# Patient Record
Sex: FEMALE | Race: WHITE | NOT HISPANIC OR LATINO | Employment: FULL TIME | URBAN - METROPOLITAN AREA
[De-identification: names, ages, dates, MRNs, and addresses within clinical notes are randomized per-mention and may not be internally consistent; named-entity substitution may affect disease eponyms.]

---

## 2019-09-06 ENCOUNTER — APPOINTMENT (EMERGENCY)
Dept: ULTRASOUND IMAGING | Facility: HOSPITAL | Age: 44
End: 2019-09-06
Payer: COMMERCIAL

## 2019-09-06 ENCOUNTER — OFFICE VISIT (OUTPATIENT)
Dept: URGENT CARE | Age: 44
End: 2019-09-06
Payer: COMMERCIAL

## 2019-09-06 ENCOUNTER — HOSPITAL ENCOUNTER (EMERGENCY)
Facility: HOSPITAL | Age: 44
Discharge: HOME | End: 2019-09-06
Attending: EMERGENCY MEDICINE | Admitting: EMERGENCY MEDICINE
Payer: COMMERCIAL

## 2019-09-06 VITALS
BODY MASS INDEX: 21.33 KG/M2 | HEIGHT: 65 IN | WEIGHT: 128 LBS | TEMPERATURE: 99.6 F | SYSTOLIC BLOOD PRESSURE: 118 MMHG | OXYGEN SATURATION: 97 % | DIASTOLIC BLOOD PRESSURE: 71 MMHG | HEART RATE: 116 BPM

## 2019-09-06 VITALS
SYSTOLIC BLOOD PRESSURE: 127 MMHG | DIASTOLIC BLOOD PRESSURE: 84 MMHG | WEIGHT: 130.14 LBS | OXYGEN SATURATION: 98 % | BODY MASS INDEX: 21.66 KG/M2 | HEART RATE: 110 BPM | TEMPERATURE: 98.6 F

## 2019-09-06 DIAGNOSIS — R60.0 LOWER EXTREMITY EDEMA: Primary | ICD-10-CM

## 2019-09-06 DIAGNOSIS — R60.0 EDEMA OF LEFT LOWER EXTREMITY: Primary | ICD-10-CM

## 2019-09-06 DIAGNOSIS — M79.89 LEG SWELLING: ICD-10-CM

## 2019-09-06 PROCEDURE — 99283 EMERGENCY DEPT VISIT LOW MDM: CPT | Performed by: EMERGENCY MEDICINE

## 2019-09-06 PROCEDURE — 99283 EMERGENCY DEPT VISIT LOW MDM: CPT

## 2019-09-06 PROCEDURE — G0382 LEV 3 HOSP TYPE B ED VISIT: HCPCS | Performed by: FAMILY MEDICINE

## 2019-09-06 PROCEDURE — 93971 EXTREMITY STUDY: CPT

## 2019-09-06 PROCEDURE — S9083 URGENT CARE CENTER GLOBAL: HCPCS | Performed by: FAMILY MEDICINE

## 2019-09-06 NOTE — PROGRESS NOTES
[facility]        NAME: Clara Moeller is a 45 y o  female  : 1975    MRN: [id number]  DATE: 2019  TIME: 7:24 PM    Assessment and Plan   Edema of left lower extremity [R60 0]  1  Edema of left lower extremity  Transfer to other facility         Patient Instructions       Follow up with PCP in 3-5 days  Proceed to  ER if symptoms worsen  Chief Complaint     Chief Complaint   Patient presents with    Leg Swelling     both legs, more swelling on the left  First started last Wednesday  History of Present Illness       Patient here for evaluation of persistent swelling in her lower extremities  Left greater than right  She states about a week and half ago she had a severe bout of vomiting and diarrhea and was bed ridden for about 24 hours  She is usually very active  She states at that her legs were swollen as well as her hands  The swelling in her hands has come back to normal but her left leg continues to be swollen  She states her right leg does have some swelling as well  Review of Systems   Review of Systems   Constitutional: Negative  Respiratory: Negative for shortness of breath and wheezing  Cardiovascular: Positive for leg swelling  Negative for chest pain and palpitations  Current Medications     No current outpatient medications on file  Current Allergies     Allergies as of 2019    (No Known Allergies)            The following portions of the patient's history were reviewed and updated as appropriate: allergies, current medications, past family history, past medical history, past social history, past surgical history and problem list      History reviewed  No pertinent past medical history  History reviewed  No pertinent surgical history  History reviewed  No pertinent family history  Medications have been verified          Objective   /71 (BP Location: Left arm, Patient Position: Sitting, Cuff Size: Standard)   Pulse (!) 116   Temp 99 6 °F (37 6 °C)   Ht 5' 5" (1 651 m)   Wt 58 1 kg (128 lb)   SpO2 97%   BMI 21 30 kg/m²        Physical Exam     Physical Exam   Constitutional: She is oriented to person, place, and time  She appears well-developed and well-nourished  No distress  HENT:   Head: Normocephalic and atraumatic  Musculoskeletal: She exhibits edema (Left lower extremity with 1+ pitting edema  The left calf measures at 14-1/2 inches 3 inches below the tibial tuberosity and the right calf measures 13-1/2 inches 3 inches below tibial tuberosity )  DP and PT pulses intact  Skin warm and dry  No erythema  No warmth  No ecchymosis  Negative Homans bilaterally  Neurological: She is alert and oriented to person, place, and time  No sensory deficit  Skin: Skin is warm and dry  Capillary refill takes less than 2 seconds  No rash noted  She is not diaphoretic  Psychiatric: She has a normal mood and affect  Her behavior is normal  Judgment and thought content normal    Nursing note and vitals reviewed

## 2019-09-07 PROCEDURE — 93971 EXTREMITY STUDY: CPT | Performed by: SURGERY

## 2019-09-07 NOTE — ED PROVIDER NOTES
History  Chief Complaint   Patient presents with    Leg Swelling     Patient states about 10 days ago she was vomitting and having diarrhea for 24 hours and then didnt feel good enough to eat until two days after  Since then, patients legs have been swollen  Pateint was seen at [facility] and they wanted her evaluated for DVT  Cheryl Perkins is a 45year old Female with no significant past medical history who presented to the ED with left leg swelling that has been present for the last 10 days  She reports going out to dinner with her family 10 days ago which was followed by nausea, vomiting and diarrhea for 24 hours during which she left very ill  She noticed her legs were swollen bilaterally the left more so than the right  She denies any pain, redness, color change, difficulty ambulating or decrease in range of motion  The swelling is usually mild in the morning and progresses as the day goes on  [facility]  Pertinent negatives include SOB, chest pain, palpitations, cough, wheezing fever/chills, changes in bowel or bladder habits  Patient denies any pain  During the ED visit: Patient's VAS of lower limbs showed right lower limb- Evaluation shows no evidence of thrombus in the common femoral vein  Doppler evaluation shows a normal response to augmentation maneuvers  Left lower limb: No evidence of acute or chronic deep vein thrombosis  No evidence of superficial thrombophlebitis noted  Doppler evaluation shows a normal response to augmentation maneuvers  Popliteal, posterior tibial and anterior tibial arterial Doppler waveforms are triphasic  Patient was discharged in stable condition and counseled to follow up with a PCP  None       History reviewed  No pertinent past medical history  History reviewed  No pertinent surgical history  History reviewed  No pertinent family history    I have reviewed and agree with the history as documented  Social History     Tobacco Use    Smoking status: Current Every Day Smoker     Packs/day: 0 25    Smokeless tobacco: Never Used   Substance Use Topics    Alcohol use: Not Currently    Drug use: Never        Review of Systems    Physical Exam  ED Triage Vitals [09/06/19 2002]   Temperature Pulse Resp Blood Pressure SpO2   98 6 °F (37 °C) (!) 110 -- 127/84 98 %      Temp Source Heart Rate Source Patient Position - Orthostatic VS BP Location FiO2 (%)   Oral Monitor Lying Right arm --      Pain Score       No Pain             Orthostatic Vital Signs  Vitals:    09/06/19 2002   BP: 127/84   Pulse: (!) 110   Patient Position - Orthostatic VS: Lying       Physical Exam    ED Medications  Medications - No data to display    Diagnostic Studies  Results Reviewed     None                 VAS lower limb venous duplex study, unilateral/limited    (Results Pending)         Procedures  Procedures        ED Course   During the ED visit: Patient's VAS of lower limbs showed right lower limb- Evaluation shows no evidence of thrombus in the common femoral vein  Doppler evaluation shows a normal response to augmentation maneuvers  Left lower limb: No evidence of acute or chronic deep vein thrombosis  No evidence of superficial thrombophlebitis noted  Doppler evaluation shows a normal response to augmentation maneuvers  Popliteal, posterior tibial and anterior tibial arterial Doppler waveforms are triphasic  Patient was discharged in stable condition and counseled to follow up with a PCP                                  MDM    Disposition  Final diagnoses:   Lower extremity edema   Leg swelling     Time reflects when diagnosis was documented in both MDM as applicable and the Disposition within this note     Time User Action Codes Description Comment    9/6/2019 10:10 PM Fabi Garcia Add [R60 0] Lower extremity edema     9/6/2019 10:11 PM Isabela Markham Add [M79 89] Leg swelling       ED Disposition     ED Disposition Condition Date/Time Comment    Discharge Stable Fri Sep 6, 2019 10:10 PM Jason Silva discharge to home/self care  Follow-up Information     Follow up With Specialties Details Why Contact Info Additional Information    Mari Peter MD Family Medicine Schedule an appointment as soon as possible for a visit  for follow up [id number] [facility] Emergency Department Emergency Medicine Go to  If symptoms worsen [id number] [id number] [phone] [facility], [address], [place], [place], [address]          Patient's Medications    No medications on file     No discharge procedures on file  ED Provider  Attending physically available and evaluated Ernestine Gonzalez I managed the patient along with the ED Attending      Electronically Signed by         Joaquim Subramanian MD  09/06/19 5349

## 2019-09-07 NOTE — ED ATTENDING ATTESTATION
Gilbert Gunn MD, saw and evaluated the patient  I have discussed the patient with the resident/non-physician practitioner and agree with the resident's/non-physician practitioner's findings, Plan of Care, and MDM as documented in the resident's/non-physician practitioner's note, except where noted  All available labs and Radiology studies were reviewed  I was present for key portions of any procedure(s) performed by the resident/non-physician practitioner and I was immediately available to provide assistance  At this point I agree with the current assessment done in the Emergency Department    I have conducted an independent evaluation of this patient a history and physical is as follows:      Critical Care Time  Procedures

## 2019-09-07 NOTE — ED NOTES
PT awake and alert, no distress noted  No other questions upon d/c       April Dung Elam RN  09/06/19 8319

## 2019-09-13 ENCOUNTER — OFFICE VISIT (OUTPATIENT)
Dept: FAMILY MEDICINE CLINIC | Facility: CLINIC | Age: 44
End: 2019-09-13

## 2019-09-13 VITALS
WEIGHT: 132 LBS | RESPIRATION RATE: 18 BRPM | HEART RATE: 88 BPM | TEMPERATURE: 98.9 F | DIASTOLIC BLOOD PRESSURE: 80 MMHG | HEIGHT: 64 IN | SYSTOLIC BLOOD PRESSURE: 120 MMHG | BODY MASS INDEX: 22.53 KG/M2

## 2019-09-13 DIAGNOSIS — R60.0 BILATERAL EDEMA OF LOWER EXTREMITY: Primary | ICD-10-CM

## 2019-09-13 LAB
SL AMB  POCT GLUCOSE, UA: NEGATIVE
SL AMB LEUKOCYTE ESTERASE,UA: NEGATIVE
SL AMB POCT BILIRUBIN,UA: ABNORMAL
SL AMB POCT BLOOD,UA: ABNORMAL
SL AMB POCT CLARITY,UA: CLEAR
SL AMB POCT COLOR,UA: ABNORMAL
SL AMB POCT KETONES,UA: NEGATIVE
SL AMB POCT NITRITE,UA: NEGATIVE
SL AMB POCT PH,UA: 6
SL AMB POCT SPECIFIC GRAVITY,UA: 1.03
SL AMB POCT URINE PROTEIN: ABNORMAL
SL AMB POCT UROBILINOGEN: 0.2

## 2019-09-13 PROCEDURE — 99213 OFFICE O/P EST LOW 20 MIN: CPT | Performed by: FAMILY MEDICINE

## 2019-09-13 PROCEDURE — 81003 URINALYSIS AUTO W/O SCOPE: CPT | Performed by: FAMILY MEDICINE

## 2019-09-13 NOTE — PROGRESS NOTES
Assessment/Plan:    Bilateral edema of lower extremity  Doppler results reviewed  POC UA significant for proteinuria  Edema most probably 2/2 to glomerulonephropathy, less likely cardiac   - 2/2 to HUS?  - order: urine protein cre ratio, CMP, CBC, TSH, UPEP, Echo  - will consider nephro referral based on lab results  Diagnoses and all orders for this visit:    Bilateral edema of lower extremity  -     Echo complete with contrast if indicated; Future  -     TSH, 3rd generation with Free T4 reflex; Future  -     CBC and differential; Future  -     Comprehensive metabolic panel; Future  -     Protein electrophoresis, urine  -     Protein / creatinine ratio, urine  -     Cancel: Protein / creatinine ratio, urine  -     Cancel: Protein electrophoresis, urine  -     POCT urine dip auto non-scope          Subjective:      Patient ID: Ke Medley is a 45 y o  female  HPI     46 yo F w no significant PMHx and lack of outpatient f/u x many years presents to clinic for ER f/u regarding BL LE edema  Patient has been experiencing edema since August 29th  Patient reports on August 28th her and her family had a bout of gastroenteritis which lasted one day  Patient feels as though this was the worst episode of gastroenteritis she's experienced  Patient reports that the next day, she noted that she developed pedal edema  Patient reports that edema has been progressively worsening over the past week and her feet are now painful  Patient reports that edema is extending to lower abdomen  For this reason patient initially presented to urgent care on 09/06 and then referred to ED to r/o DVT  Dopplers completed in ED, negative x2  Denies tobacco, etOH, drug use  No pertinent PMHx  FMHx significant for CHF       The following portions of the patient's history were reviewed and updated as appropriate: allergies, current medications, past family history, past medical history, past social history, past surgical history and problem list     Review of Systems   Constitutional: Negative for activity change, appetite change, chills, diaphoresis, fatigue, fever and unexpected weight change  HENT: Negative for congestion, ear pain, rhinorrhea, sinus pressure, sinus pain and sore throat  Eyes: Negative for discharge and visual disturbance  Respiratory: Negative for cough, chest tightness, shortness of breath and wheezing  Cardiovascular: Positive for leg swelling  Negative for chest pain and palpitations  Gastrointestinal: Negative for abdominal pain, blood in stool, constipation, diarrhea, nausea and vomiting  Genitourinary: Negative for difficulty urinating, frequency and urgency  Musculoskeletal: Negative for arthralgias, back pain, neck pain and neck stiffness  Skin: Negative for rash and wound  Allergic/Immunologic: Negative for environmental allergies and food allergies  Neurological: Negative for dizziness, weakness, light-headedness, numbness and headaches  Psychiatric/Behavioral: Negative for decreased concentration and dysphoric mood  Objective:      /80   Pulse 88   Temp 98 9 °F (37 2 °C)   Resp 18   Ht 5' 4" (1 626 m)   Wt 59 9 kg (132 lb)   LMP 09/03/2019   BMI 22 66 kg/m²          Physical Exam   Constitutional: She appears well-developed and well-nourished  No distress  HENT:   Head: Normocephalic and atraumatic  Mouth/Throat: Oropharynx is clear and moist  No oropharyngeal exudate  Eyes: Pupils are equal, round, and reactive to light  Neck: Normal range of motion  Cardiovascular: Normal rate, regular rhythm and normal heart sounds  Pulmonary/Chest: Effort normal and breath sounds normal  No respiratory distress  She has no wheezes  She has no rales  Abdominal: Soft  There is no tenderness  Musculoskeletal: She exhibits edema  3+ pitting edema going up above knees  Dependent edema   Neurological: She is alert  Skin: Skin is warm and dry  She is not diaphoretic  Psychiatric: She has a normal mood and affect  Vitals reviewed

## 2019-09-14 ENCOUNTER — APPOINTMENT (OUTPATIENT)
Dept: LAB | Age: 44
End: 2019-09-14
Payer: COMMERCIAL

## 2019-09-14 DIAGNOSIS — R60.0 BILATERAL EDEMA OF LOWER EXTREMITY: ICD-10-CM

## 2019-09-14 LAB
ALBUMIN SERPL BCP-MCNC: 1.1 G/DL (ref 3.5–5)
ALP SERPL-CCNC: 57 U/L (ref 46–116)
ALT SERPL W P-5'-P-CCNC: 43 U/L (ref 12–78)
ANION GAP SERPL CALCULATED.3IONS-SCNC: 0 MMOL/L (ref 4–13)
AST SERPL W P-5'-P-CCNC: 40 U/L (ref 5–45)
BASOPHILS # BLD AUTO: 0.08 THOUSANDS/ΜL (ref 0–0.1)
BASOPHILS NFR BLD AUTO: 1 % (ref 0–1)
BILIRUB SERPL-MCNC: 0.46 MG/DL (ref 0.2–1)
BUN SERPL-MCNC: 10 MG/DL (ref 5–25)
CALCIUM SERPL-MCNC: 7.7 MG/DL (ref 8.3–10.1)
CHLORIDE SERPL-SCNC: 105 MMOL/L (ref 100–108)
CO2 SERPL-SCNC: 32 MMOL/L (ref 21–32)
CREAT SERPL-MCNC: 0.48 MG/DL (ref 0.6–1.3)
CREAT UR-MCNC: 53.7 MG/DL
EOSINOPHIL # BLD AUTO: 0.26 THOUSAND/ΜL (ref 0–0.61)
EOSINOPHIL NFR BLD AUTO: 4 % (ref 0–6)
ERYTHROCYTE [DISTWIDTH] IN BLOOD BY AUTOMATED COUNT: 12.2 % (ref 11.6–15.1)
GFR SERPL CREATININE-BSD FRML MDRD: 121 ML/MIN/1.73SQ M
GLUCOSE P FAST SERPL-MCNC: 80 MG/DL (ref 65–99)
HCT VFR BLD AUTO: 42.3 % (ref 34.8–46.1)
HGB BLD-MCNC: 13.3 G/DL (ref 11.5–15.4)
IMM GRANULOCYTES # BLD AUTO: 0.01 THOUSAND/UL (ref 0–0.2)
IMM GRANULOCYTES NFR BLD AUTO: 0 % (ref 0–2)
LYMPHOCYTES # BLD AUTO: 2.25 THOUSANDS/ΜL (ref 0.6–4.47)
LYMPHOCYTES NFR BLD AUTO: 32 % (ref 14–44)
MCH RBC QN AUTO: 29.2 PG (ref 26.8–34.3)
MCHC RBC AUTO-ENTMCNC: 31.4 G/DL (ref 31.4–37.4)
MCV RBC AUTO: 93 FL (ref 82–98)
MONOCYTES # BLD AUTO: 0.61 THOUSAND/ΜL (ref 0.17–1.22)
MONOCYTES NFR BLD AUTO: 9 % (ref 4–12)
NEUTROPHILS # BLD AUTO: 3.76 THOUSANDS/ΜL (ref 1.85–7.62)
NEUTS SEG NFR BLD AUTO: 54 % (ref 43–75)
NRBC BLD AUTO-RTO: 0 /100 WBCS
PLATELET # BLD AUTO: 370 THOUSANDS/UL (ref 149–390)
PMV BLD AUTO: 8.8 FL (ref 8.9–12.7)
POTASSIUM SERPL-SCNC: 4 MMOL/L (ref 3.5–5.3)
PROT SERPL-MCNC: 4.7 G/DL (ref 6.4–8.2)
PROT UR-MCNC: 418 MG/DL
PROT/CREAT UR: 7.78 MG/G{CREAT} (ref 0–0.1)
RBC # BLD AUTO: 4.55 MILLION/UL (ref 3.81–5.12)
SODIUM SERPL-SCNC: 137 MMOL/L (ref 136–145)
TSH SERPL DL<=0.05 MIU/L-ACNC: 2.01 UIU/ML (ref 0.36–3.74)
WBC # BLD AUTO: 6.97 THOUSAND/UL (ref 4.31–10.16)

## 2019-09-14 PROCEDURE — 84166 PROTEIN E-PHORESIS/URINE/CSF: CPT | Performed by: PATHOLOGY

## 2019-09-14 PROCEDURE — 80053 COMPREHEN METABOLIC PANEL: CPT

## 2019-09-14 PROCEDURE — 36415 COLL VENOUS BLD VENIPUNCTURE: CPT

## 2019-09-14 PROCEDURE — 84156 ASSAY OF PROTEIN URINE: CPT | Performed by: FAMILY MEDICINE

## 2019-09-14 PROCEDURE — 85025 COMPLETE CBC W/AUTO DIFF WBC: CPT

## 2019-09-14 PROCEDURE — 82570 ASSAY OF URINE CREATININE: CPT | Performed by: FAMILY MEDICINE

## 2019-09-14 PROCEDURE — 84443 ASSAY THYROID STIM HORMONE: CPT

## 2019-09-14 PROCEDURE — 84166 PROTEIN E-PHORESIS/URINE/CSF: CPT | Performed by: FAMILY MEDICINE

## 2019-09-15 PROBLEM — R60.0 BILATERAL EDEMA OF LOWER EXTREMITY: Status: ACTIVE | Noted: 2019-09-15

## 2019-09-16 LAB
ALBUMIN UR ELPH-MCNC: 78.1 %
ALPHA1 GLOB MFR UR ELPH: 6.8 %
ALPHA2 GLOB MFR UR ELPH: 2.7 %
B-GLOBULIN MFR UR ELPH: 6.6 %
GAMMA GLOB MFR UR ELPH: 5.8 %
PROT PATTERN UR ELPH-IMP: ABNORMAL
PROT UR-MCNC: 427 MG/DL

## 2019-09-16 NOTE — ASSESSMENT & PLAN NOTE
Doppler results reviewed  POC UA significant for proteinuria  Edema most probably 2/2 to glomerulonephropathy, less likely cardiac   - 2/2 to HUS?  - order: urine protein cre ratio, CMP, CBC, TSH, UPEP, Echo  - will consider nephro referral based on lab results

## 2019-09-18 ENCOUNTER — TELEPHONE (OUTPATIENT)
Dept: FAMILY MEDICINE CLINIC | Facility: CLINIC | Age: 44
End: 2019-09-18

## 2019-09-18 DIAGNOSIS — R60.0 BILATERAL EDEMA OF LOWER EXTREMITY: Primary | ICD-10-CM

## 2019-09-18 NOTE — TELEPHONE ENCOUNTER
Called patient today @ 4651, discussed lab results- persistent proteinuria; most probably glomerular in origin  Patient continues to have edema  Referred patient to nephro for further evaluation  If patient is not able to get appt soon, will contact office to help expedite process

## 2019-09-20 ENCOUNTER — TELEPHONE (OUTPATIENT)
Dept: FAMILY MEDICINE CLINIC | Facility: CLINIC | Age: 44
End: 2019-09-20

## 2019-09-20 NOTE — TELEPHONE ENCOUNTER
Patient was seen by Dr Hannah Villafuerte on 9/13/2019 and was referred to nephrology on [address]  Dr Hannah Villafuerte indicated to the patient that if she could not get an appointment with nephrology soon to let her know  Next appointment with nephrology was on 10/30/2019 so patient would like Dr Hannah Villafuerte to change order to see someone else or should patient make appointment for 10/30/2019   Patient can be reached at [phone]

## 2019-09-20 NOTE — TELEPHONE ENCOUNTER
We are trying to see where we can fit this pt into the schedule, is it possible if you can review pt chart and let me know how urgent? She doesn't have at lot of records so im unsure of medical history prior

## 2019-09-20 NOTE — TELEPHONE ENCOUNTER
Discussed with Aristides Barraza, staff at [address] [facility] office @3998 today  Patient was was offered earlier appointment at different office  Pt did not get back to office as to whether she would be able to accept this appointment  Aristides Barraza is looking into finding a sooner appointment and will reach out to patient +/- our office  Patient updated as well  Thank you  Patient continues to experience swelling, patient had an episode of facial edema (around eyes), which resolved  Patient advised to go to ER if any concerns of difficulty breathing/SOB  Patient experiences understand  Answered all questions appropriately

## 2019-09-22 ENCOUNTER — HOSPITAL ENCOUNTER (INPATIENT)
Facility: HOSPITAL | Age: 44
LOS: 2 days | Discharge: HOME | DRG: 700 | End: 2019-09-26
Attending: EMERGENCY MEDICINE | Admitting: HOSPITALIST
Payer: COMMERCIAL

## 2019-09-22 DIAGNOSIS — E88.09 HYPOALBUMINEMIA: ICD-10-CM

## 2019-09-22 DIAGNOSIS — R60.1 ANASARCA: ICD-10-CM

## 2019-09-22 DIAGNOSIS — R60.0 BILATERAL EDEMA OF LOWER EXTREMITY: ICD-10-CM

## 2019-09-22 DIAGNOSIS — R80.9 PROTEINURIA: ICD-10-CM

## 2019-09-22 DIAGNOSIS — K13.79 UVULAR EDEMA: Primary | ICD-10-CM

## 2019-09-22 DIAGNOSIS — N04.9 NEPHROTIC SYNDROME: ICD-10-CM

## 2019-09-22 PROBLEM — R79.89 ELEVATED BRAIN NATRIURETIC PEPTIDE (BNP) LEVEL: Status: ACTIVE | Noted: 2019-09-22

## 2019-09-22 LAB
ALBUMIN SERPL BCP-MCNC: 0.9 G/DL (ref 3.5–5)
ALP SERPL-CCNC: 55 U/L (ref 46–116)
ALT SERPL W P-5'-P-CCNC: 34 U/L (ref 12–78)
ANION GAP SERPL CALCULATED.3IONS-SCNC: 3 MMOL/L (ref 4–13)
AST SERPL W P-5'-P-CCNC: 37 U/L (ref 5–45)
BACTERIA UR QL AUTO: ABNORMAL /HPF
BASOPHILS # BLD AUTO: 0.09 THOUSANDS/ΜL (ref 0–0.1)
BASOPHILS NFR BLD AUTO: 1 % (ref 0–1)
BILIRUB SERPL-MCNC: 0.4 MG/DL (ref 0.2–1)
BILIRUB UR QL STRIP: NEGATIVE
BUN SERPL-MCNC: 9 MG/DL (ref 5–25)
CALCIUM SERPL-MCNC: 7.2 MG/DL (ref 8.3–10.1)
CHLORIDE SERPL-SCNC: 110 MMOL/L (ref 100–108)
CLARITY UR: CLEAR
CO2 SERPL-SCNC: 30 MMOL/L (ref 21–32)
COLOR UR: YELLOW
CREAT SERPL-MCNC: 0.51 MG/DL (ref 0.6–1.3)
EOSINOPHIL # BLD AUTO: 0.57 THOUSAND/ΜL (ref 0–0.61)
EOSINOPHIL NFR BLD AUTO: 6 % (ref 0–6)
ERYTHROCYTE [DISTWIDTH] IN BLOOD BY AUTOMATED COUNT: 12.5 % (ref 11.6–15.1)
GFR SERPL CREATININE-BSD FRML MDRD: 119 ML/MIN/1.73SQ M
GLUCOSE SERPL-MCNC: 85 MG/DL (ref 65–140)
GLUCOSE UR STRIP-MCNC: NEGATIVE MG/DL
HCT VFR BLD AUTO: 41.1 % (ref 34.8–46.1)
HGB BLD-MCNC: 13.3 G/DL (ref 11.5–15.4)
HGB UR QL STRIP.AUTO: NEGATIVE
IMM GRANULOCYTES # BLD AUTO: 0.02 THOUSAND/UL (ref 0–0.2)
IMM GRANULOCYTES NFR BLD AUTO: 0 % (ref 0–2)
KETONES UR STRIP-MCNC: NEGATIVE MG/DL
LEUKOCYTE ESTERASE UR QL STRIP: NEGATIVE
LYMPHOCYTES # BLD AUTO: 1.58 THOUSANDS/ΜL (ref 0.6–4.47)
LYMPHOCYTES NFR BLD AUTO: 17 % (ref 14–44)
MCH RBC QN AUTO: 29.9 PG (ref 26.8–34.3)
MCHC RBC AUTO-ENTMCNC: 32.4 G/DL (ref 31.4–37.4)
MCV RBC AUTO: 92 FL (ref 82–98)
MONOCYTES # BLD AUTO: 0.78 THOUSAND/ΜL (ref 0.17–1.22)
MONOCYTES NFR BLD AUTO: 8 % (ref 4–12)
MUCOUS THREADS UR QL AUTO: ABNORMAL
NEUTROPHILS # BLD AUTO: 6.48 THOUSANDS/ΜL (ref 1.85–7.62)
NEUTS SEG NFR BLD AUTO: 68 % (ref 43–75)
NITRITE UR QL STRIP: NEGATIVE
NON-SQ EPI CELLS URNS QL MICRO: ABNORMAL /HPF
NRBC BLD AUTO-RTO: 0 /100 WBCS
NT-PROBNP SERPL-MCNC: 214 PG/ML
PH UR STRIP.AUTO: 8.5 [PH] (ref 4.5–8)
PLATELET # BLD AUTO: 328 THOUSANDS/UL (ref 149–390)
PMV BLD AUTO: 8.7 FL (ref 8.9–12.7)
POTASSIUM SERPL-SCNC: 3.9 MMOL/L (ref 3.5–5.3)
PROT SERPL-MCNC: 4.7 G/DL (ref 6.4–8.2)
PROT UR STRIP-MCNC: >=300 MG/DL
RBC # BLD AUTO: 4.45 MILLION/UL (ref 3.81–5.12)
RBC #/AREA URNS AUTO: ABNORMAL /HPF
SODIUM SERPL-SCNC: 143 MMOL/L (ref 136–145)
SP GR UR STRIP.AUTO: 1.02 (ref 1–1.03)
UROBILINOGEN UR QL STRIP.AUTO: 0.2 E.U./DL
WBC # BLD AUTO: 9.52 THOUSAND/UL (ref 4.31–10.16)
WBC #/AREA URNS AUTO: ABNORMAL /HPF

## 2019-09-22 PROCEDURE — 96374 THER/PROPH/DIAG INJ IV PUSH: CPT

## 2019-09-22 PROCEDURE — 81001 URINALYSIS AUTO W/SCOPE: CPT

## 2019-09-22 PROCEDURE — 83880 ASSAY OF NATRIURETIC PEPTIDE: CPT | Performed by: EMERGENCY MEDICINE

## 2019-09-22 PROCEDURE — 36415 COLL VENOUS BLD VENIPUNCTURE: CPT | Performed by: EMERGENCY MEDICINE

## 2019-09-22 PROCEDURE — 99220 PR INITIAL OBSERVATION CARE/DAY 70 MINUTES: CPT | Performed by: INTERNAL MEDICINE

## 2019-09-22 PROCEDURE — 85025 COMPLETE CBC W/AUTO DIFF WBC: CPT | Performed by: EMERGENCY MEDICINE

## 2019-09-22 PROCEDURE — 80053 COMPREHEN METABOLIC PANEL: CPT | Performed by: EMERGENCY MEDICINE

## 2019-09-22 PROCEDURE — 99284 EMERGENCY DEPT VISIT MOD MDM: CPT

## 2019-09-22 PROCEDURE — 99285 EMERGENCY DEPT VISIT HI MDM: CPT | Performed by: EMERGENCY MEDICINE

## 2019-09-22 RX ORDER — ONDANSETRON 2 MG/ML
4 INJECTION INTRAMUSCULAR; INTRAVENOUS EVERY 6 HOURS PRN
Status: DISCONTINUED | OUTPATIENT
Start: 2019-09-22 | End: 2019-09-26 | Stop reason: HOSPADM

## 2019-09-22 RX ORDER — ACETAMINOPHEN 325 MG/1
650 TABLET ORAL EVERY 6 HOURS PRN
Status: DISCONTINUED | OUTPATIENT
Start: 2019-09-22 | End: 2019-09-26 | Stop reason: HOSPADM

## 2019-09-22 RX ORDER — DEXAMETHASONE SODIUM PHOSPHATE 4 MG/ML
10 INJECTION, SOLUTION INTRA-ARTICULAR; INTRALESIONAL; INTRAMUSCULAR; INTRAVENOUS; SOFT TISSUE ONCE
Status: COMPLETED | OUTPATIENT
Start: 2019-09-22 | End: 2019-09-22

## 2019-09-22 RX ADMIN — DEXAMETHASONE SODIUM PHOSPHATE 10 MG: 4 INJECTION, SOLUTION INTRAMUSCULAR; INTRAVENOUS at 11:01

## 2019-09-22 NOTE — ASSESSMENT & PLAN NOTE
Urine macroscopic study on admission shows Urine Protein >300 mg/dl   This, in combination with patient's other symptoms and lab results, is suggestive of nephrotic syndrome  (see above for more details)

## 2019-09-22 NOTE — ASSESSMENT & PLAN NOTE
Patient is a 45year old female, previously healthy, who developed gastroenteritis almost 4 weeks ago with nausea, vomiting and diarrhea  She then developed bilateral lower extremity edema which got progressively worse, especially after standing for several hours  This, then, progressed to anasarca  Patient states she has gained over 20 lbs in less than 3 weeks  Patient went to see her PCP who ordered lab tests that showed hypoalbumineamia (0 9 g/dl), Proteinuria (>300 mg/dl)  Patient was referred to nephrology, however the earliest appointment is for 10/30/2019  Today, patient woke up with swelling around the eyes and in her neck, which progressed to difficulty breathing  For this reason, she came to the ED at [address]  [facility]  On examination, she was found to have uvular edema  She received Decadron 10 mg and her condition improved  Patient's symptoms (bilateral lower extremity edema, anasarca and facial edema) and lab workup showing hypoalbuminemia and proteinuria are likely due to nephrotic syndrome  Will order lipid panel and consult nephrology  Patient has a positive family history for CHF in father (at 50years old, due to viral infection) and brother (when he was in his 35s, cause remains unknown)  Due to this, an echocardiogram will be ordered  Although cardiac cause of her current symptoms is less likely  Plan:  · Consult nephrology   · Lipid Panel  · Echocardiogram  · Consult Nutrition   · Diet: High protein and High carbohydrates, Na < 2 gr/day, fluid restriction <1500  · DVT Prophylaxis  · Monitor I/O  · Monitor Weight  · Monitor CBC and CMP

## 2019-09-22 NOTE — ED PROVIDER NOTES
History  Chief Complaint   Patient presents with    Facial Swelling     facial swelling with trouble swallowing  Onset this morning     44 yo female without PMH presents with sensastion of throat closing in setting of one month generalized swelling of body  Reports symptoms began after severe viral type episode of n/v/d experienced by herself and several other family members  Since that time reports intermittently waking up with facial swelling and sensation of throat swelling that improved over course of day as well as gradually worsening LE edema and swelling of abdomen that makes pants tight and shoes tight  Pt denies fever, rash or joint pain  Denies family h/o of rheumatologic or autoimmune problems  Seen in ED for leg swelling, had negative Doppler, seen by new PCP who ordered labs significant for low albumin and proteinuria  Pt has been trying to schedule outpatient nephro follow up and outpatient echo unsuccessfully  History provided by:  Medical records and patient   used: No    Sore Throat   Location:  Posterior  Severity:  No pain  Onset quality:  Gradual  Duration:  1 day  Timing:  Constant  Progression:  Improving  Chronicity:  New  Relieved by:  Nothing  Worsened by:  Nothing  Ineffective treatments:  None tried  Associated symptoms: shortness of breath, sinus congestion and trouble swallowing    Associated symptoms: no abdominal pain, no chills, no cough, no drooling, no ear discharge, no ear pain, no eye discharge, no fever, no headaches, no plugged ear sensation, no postnasal drip, no rash, no rhinorrhea, no stridor and no voice change    Risk factors: no sick contacts        None       History reviewed  No pertinent past medical history  Past Surgical History:   Procedure Laterality Date    TUBAL LIGATION      WISDOM TOOTH EXTRACTION         History reviewed  No pertinent family history  I have reviewed and agree with the history as documented      Social History     Tobacco Use    Smoking status: Current Every Day Smoker     Packs/day: 0 25    Smokeless tobacco: Never Used   Substance Use Topics    Alcohol use: Not Currently    Drug use: Never        Review of Systems   Constitutional: Negative for chills and fever  HENT: Positive for sore throat and trouble swallowing  Negative for drooling, ear discharge, ear pain, postnasal drip, rhinorrhea and voice change  Eyes: Negative for discharge  Respiratory: Positive for shortness of breath  Negative for cough and stridor  Cardiovascular: Positive for leg swelling  Gastrointestinal: Positive for diarrhea and vomiting  Negative for abdominal pain  Endocrine: Negative for cold intolerance, heat intolerance, polydipsia, polyphagia and polyuria  Genitourinary: Negative for decreased urine volume, flank pain, frequency and hematuria  Musculoskeletal: Negative for joint swelling  Skin: Negative for rash  Neurological: Negative for headaches  Hematological: Does not bruise/bleed easily  All other systems reviewed and are negative  Physical Exam  Physical Exam   Constitutional: She is oriented to person, place, and time  She appears well-developed and well-nourished  HENT:   Head: Normocephalic and atraumatic  Mouth/Throat: Uvula is midline and mucous membranes are normal  No oral lesions  No trismus in the jaw  Normal dentition  Uvula swelling present  No oropharyngeal exudate, posterior oropharyngeal edema, posterior oropharyngeal erythema or tonsillar abscesses  No tonsillar exudate  Markedly swollen uvula that is midline, mucosa normal,  Voice normal, no trismus, tongue and floor of the mouth benign, no lesions/ulcerations, no drooling, no tonsillar swelling   Eyes: Pupils are equal, round, and reactive to light  Conjunctivae, EOM and lids are normal  Right eye exhibits no chemosis and no discharge  No foreign body present in the right eye   Left eye exhibits no chemosis and no discharge  No foreign body present in the left eye  No scleral icterus  Right eye exhibits no nystagmus  Left eye exhibits no nystagmus  No proptosis bilaterally   Neck: Trachea normal and normal range of motion  No thyroid mass and no thyromegaly present  Cardiovascular: Normal rate, regular rhythm, normal heart sounds and intact distal pulses  No murmur heard  Pulmonary/Chest: Effort normal and breath sounds normal  No respiratory distress  Abdominal: Soft  Musculoskeletal: Normal range of motion  She exhibits no deformity  Right shoulder: She exhibits swelling  She exhibits normal range of motion, no tenderness, no bony tenderness, no effusion, no crepitus, no deformity and no pain  Bilateral symmetric LE swelling to mid calf, equal distal pulses, soft compartments, normal skin   Lymphadenopathy:     She has no cervical adenopathy  Neurological: She is alert and oriented to person, place, and time  Skin: Skin is warm and dry  She is not diaphoretic  Psychiatric: She has a normal mood and affect  Her behavior is normal  Judgment and thought content normal    Nursing note and vitals reviewed        Vital Signs  ED Triage Vitals [09/22/19 0956]   Temperature Pulse Respirations Blood Pressure SpO2   98 4 °F (36 9 °C) (!) 112 16 145/70 98 %      Temp src Heart Rate Source Patient Position - Orthostatic VS BP Location FiO2 (%)   -- Monitor Sitting Right arm --      Pain Score       No Pain           Vitals:    09/22/19 0956   BP: 145/70   Pulse: (!) 112   Patient Position - Orthostatic VS: Sitting         Visual Acuity      ED Medications  Medications   dexamethasone (DECADRON) injection 10 mg (10 mg Intravenous Given 9/22/19 1101)       Diagnostic Studies  Results Reviewed     Procedure Component Value Units Date/Time    CBC and differential [[id number]]  (Abnormal) Collected:  09/22/19 1059    Lab Status:  Final result Specimen:  Blood from Arm, Right Updated:  09/22/19 1114     WBC 9 52 Thousand/uL      RBC 4 45 Million/uL      Hemoglobin 13 3 g/dL      Hematocrit 41 1 %      MCV 92 fL      MCH 29 9 pg      MCHC 32 4 g/dL      RDW 12 5 %      MPV 8 7 fL      Platelets 657 Thousands/uL      nRBC 0 /100 WBCs      Neutrophils Relative 68 %      Immat GRANS % 0 %      Lymphocytes Relative 17 %      Monocytes Relative 8 %      Eosinophils Relative 6 %      Basophils Relative 1 %      Neutrophils Absolute 6 48 Thousands/µL      Immature Grans Absolute 0 02 Thousand/uL      Lymphocytes Absolute 1 58 Thousands/µL      Monocytes Absolute 0 78 Thousand/µL      Eosinophils Absolute 0 57 Thousand/µL      Basophils Absolute 0 09 Thousands/µL     Comprehensive metabolic panel [[id number]] Collected:  09/22/19 1059    Lab Status: In process Specimen:  Blood from Arm, Right Updated:  09/22/19 1112    B-type natriuretic peptide [[id number]] Collected:  09/22/19 1059    Lab Status: In process Specimen:  Blood from Arm, Right Updated:  09/22/19 1112                 No orders to display              Procedures  Procedures       ED Course                               MDM  Number of Diagnoses or Management Options  Anasarca:   Hypoalbuminemia:   Proteinuria:   Uvular edema:   Diagnosis management comments: Pt with progressive anasarca x 1 month in setting of hypoalbuminemia and proteinuria, now with marked uvular swelling and sensation of throat closing  Pt maintaing airway in ED  Not consistent with infection and less consistent with angioedema  Pt with normal renal/hepatic function, clear lungs and no obvious risk factors for CHF  Pt has been frustrated with attempts to follow up as outpatient    Admitted for further work up and eval         Amount and/or Complexity of Data Reviewed  Clinical lab tests: ordered and reviewed  Tests in the medicine section of CPT®: ordered and reviewed  Obtain history from someone other than the patient: yes  Review and summarize past medical records: yes  Discuss the patient with other providers: yes  Independent visualization of images, tracings, or specimens: yes    Risk of Complications, Morbidity, and/or Mortality  Presenting problems: moderate  Diagnostic procedures: moderate  Management options: moderate    Patient Progress  Patient progress: improved      Disposition  Final diagnoses:   None     ED Disposition     None      Follow-up Information    None         Patient's Medications    No medications on file     No discharge procedures on file      ED Provider  Electronically Signed by           Jacquelyn Moreno MD  09/26/19 9250

## 2019-09-22 NOTE — PLAN OF CARE
Problem: PAIN - ADULT  Goal: Verbalizes/displays adequate comfort level or baseline comfort level  Description  Interventions:  - Encourage patient to monitor pain and request assistance  - Assess pain using appropriate pain scale  - Administer analgesics based on type and severity of pain and evaluate response  - Implement non-pharmacological measures as appropriate and evaluate response  - Consider cultural and social influences on pain and pain management  - Notify physician/advanced practitioner if interventions unsuccessful or patient reports new pain  Outcome: Progressing     Problem: INFECTION - ADULT  Goal: Absence or prevention of progression during hospitalization  Description  INTERVENTIONS:  - Assess and monitor for signs and symptoms of infection  - Monitor lab/diagnostic results  - Monitor all insertion sites, i e  indwelling lines, tubes, and drains  - Monitor endotracheal if appropriate and nasal secretions for changes in amount and color  - [place] appropriate cooling/warming therapies per order  - Administer medications as ordered  - Instruct and encourage patient and family to use good hand hygiene technique  - Identify and instruct in appropriate isolation precautions for identified infection/condition  Outcome: Progressing  Goal: Absence of fever/infection during neutropenic period  Description  INTERVENTIONS:  - Monitor WBC    Outcome: Progressing     Problem: SAFETY ADULT  Goal: Patient will remain free of falls  Description  INTERVENTIONS:  - Assess patient frequently for physical needs  -  Identify cognitive and physical deficits and behaviors that affect risk of falls    -  [place] fall precautions as indicated by assessment   - Educate patient/family on patient safety including physical limitations  - Instruct patient to call for assistance with activity based on assessment  - Modify environment to reduce risk of injury  - Consider OT/PT consult to assist with strengthening/mobility  Outcome: Progressing  Goal: Maintain or return to baseline ADL function  Description  INTERVENTIONS:  -  Assess patient's ability to carry out ADLs; assess patient's baseline for ADL function and identify physical deficits which impact ability to perform ADLs (bathing, care of mouth/teeth, toileting, grooming, dressing, etc )  - Assess/evaluate cause of self-care deficits   - Assess range of motion  - Assess patient's mobility; develop plan if impaired  - Assess patient's need for assistive devices and provide as appropriate  - Encourage maximum independence but intervene and supervise when necessary  - Involve family in performance of ADLs  - Assess for home care needs following discharge   - Consider OT consult to assist with ADL evaluation and planning for discharge  - Provide patient education as appropriate  Outcome: Progressing  Goal: Maintain or return mobility status to optimal level  Description  INTERVENTIONS:  - Assess patient's baseline mobility status (ambulation, transfers, stairs, etc )    - Identify cognitive and physical deficits and behaviors that affect mobility  - Identify mobility aids required to assist with transfers and/or ambulation (gait belt, sit-to-stand, lift, walker, cane, etc )  - [place] fall precautions as indicated by assessment  - Record patient progress and toleration of activity level on Mobility SBAR; progress patient to next Phase/Stage  - Instruct patient to call for assistance with activity based on assessment  - Consider rehabilitation consult to assist with strengthening/weightbearing, etc   Outcome: Progressing     Problem: DISCHARGE PLANNING  Goal: Discharge to home or other facility with appropriate resources  Description  INTERVENTIONS:  - Identify barriers to discharge w/patient and caregiver  - Arrange for needed discharge resources and transportation as appropriate  - Identify discharge learning needs (meds, wound care, etc )  - Arrange for interpretive services to assist at discharge as needed  - Refer to Case Management Department for coordinating discharge planning if the patient needs post-hospital services based on physician/advanced practitioner order or complex needs related to functional status, cognitive ability, or social support system  Outcome: Progressing     Problem: Knowledge Deficit  Goal: Patient/family/caregiver demonstrates understanding of disease process, treatment plan, medications, and discharge instructions  Description  Complete learning assessment and assess knowledge base    Interventions:  - Provide teaching at level of understanding  - Provide teaching via preferred learning methods  Outcome: Progressing

## 2019-09-22 NOTE — H&P
H&P- Ashlie Gant 1975, 45 y o  female MRN: [id number]    Unit/Bed#: -01 Encounter: [id number]    Primary Care Provider: No primary care provider on file  Date and time admitted to hospital: 9/22/2019  9:51 AM        * Bilateral edema of lower extremity  Assessment & Plan  Patient is a 45year old female, previously healthy, who developed gastroenteritis almost 4 weeks ago with nausea, vomiting and diarrhea  She then developed bilateral lower extremity edema which got progressively worse, especially after standing for several hours  This, then, progressed to anasarca  Patient states she has gained over 20 lbs in less than 3 weeks  Patient went to see her PCP who ordered lab tests that showed hypoalbumineamia (0 9 g/dl), Proteinuria (>300 mg/dl)  Patient was referred to nephrology, however the earliest appointment is for 10/30/2019  Today, patient woke up with swelling around the eyes and in her neck, which progressed to difficulty breathing  For this reason, she came to the ED at [address]  [facility]  On examination, she was found to have uvular edema  She received Decadron 10 mg and her condition improved  Patient's symptoms (bilateral lower extremity edema, anasarca and facial edema) and lab workup showing hypoalbuminemia and proteinuria are likely due to nephrotic syndrome  Will order lipid panel and consult nephrology  Patient has a positive family history for CHF in father (at 50years old, due to viral infection) and brother (when he was in his 35s, cause remains unknown)  Due to this, an echocardiogram will be ordered  Although cardiac cause of her current symptoms is less likely  Plan:  · Consult nephrology   · Lipid Panel  · Echocardiogram  · Consult Nutrition   · Diet: High protein and High carbohydrates, Na < 2 gr/day, fluid restriction <1500  · DVT Prophylaxis  · Monitor I/O  · Monitor Weight  · Monitor CBC and CMP      Hypoalbuminemia  Assessment & Plan  Albumin on admission is 0 9 g/dl  (see above for more details)  Plan:  · Consult Nephrology    Proteinuria  Assessment & Plan  Urine macroscopic study on admission shows Urine Protein >300 mg/dl  This, in combination with patient's other symptoms and lab results, is suggestive of nephrotic syndrome  (see above for more details)    VTE Prophylaxis: Enoxaparin (Lovenox)  / sequential compression device   Code Status: Level 1 - Full code  POLST: POLST form is not discussed and not completed at this time  Anticipated Length of Stay:  Patient will be admitted on an Observation basis with an anticipated length of stay of  < 2 midnights  Justification for Hospital Stay: Patient presented with uvular edema and potential nephrotic syndrome    Chief Complaint:   Facial swelling    History of Present Illness:    Camilla Cortes is a pleasant 45 y o  female, previously healthy, who reports developing gastroenteritis almost 4 weeks ago after eating dinner out with her family  She states her and her two sons were the only ones to eat pizza and they were sick with nausea, vomiting and diarrhea for approximately 24 hours afterwards  She states she spent most of that day in bed  The next day, when she first got out of bed she noticed swelling in her lower extremities bilaterally  She came to the ED on September 06, 2019 with bilateral lower extremity edema  A Doppler US of LE was done and was negative for DVTs  She was discharged home  The edema continued to get progressively worse  She states her shoes would not fit properly and eventually she started to notice that her pants would not fit either  She states she gained approximately 20 lbs in less than 3 weeks  On September 13, 2019, she visited her PCP who ordered lab tests that showed hypoalbuminemia and proteinuria and she was referred to nephrology  However, the earliest her appointment could be scheduled was 10/30/2019   Today, patient woke up with swelling of her face and neck, she developed dysphagia and difficulty breathing and decided to come to the ED at [address] [place] [address] for evaluation, where she was noted to have uvular edema  She was treated with Decadron 10 mg and her acute symptoms improved  Labs were drawn in the ED which showed albumin level of 0 9  g/dl, proteinuria of >300 mg/dl  Patient has been admitted for further workup and treatment  Review of Systems:    Review of Systems   Constitutional: Positive for activity change and unexpected weight change (gained approximately 20 lbs in 3 weeks))  HENT: Positive for facial swelling and trouble swallowing  Respiratory: Negative for cough  Cardiovascular: Positive for leg swelling (bilateral)  Negative for chest pain and palpitations  Gastrointestinal: Positive for abdominal distention  Negative for abdominal pain, blood in stool, constipation, diarrhea, nausea and vomiting  Neurological: Negative for dizziness, tremors, weakness, light-headedness and numbness  All other systems reviewed and are negative  Past Medical and Surgical History:     History reviewed  No pertinent past medical history  Past Surgical History:   Procedure Laterality Date    TUBAL LIGATION      WISDOM TOOTH EXTRACTION         Meds/Allergies:    Prior to Admission medications    Not on File     I have reviewed home medications with patient personally  Allergies: No Known Allergies    Social History:     Marital Status: Single   Occupation:   Patient Pre-hospital Living Situation:   Patient Pre-hospital Level of Mobility: unrestricted  Patient Pre-hospital Diet Restrictions: regular diet    Substance Use History:   Social History     Substance and Sexual Activity   Alcohol Use Not Currently     Social History     Tobacco Use   Smoking Status Current Every Day Smoker    Packs/day: 0 25   Smokeless Tobacco Never Used     Social History     Substance and Sexual Activity   Drug Use Never Family History:    Family History   Problem Relation Age of Onset    No Known Problems Father         CHF at age 48    Diabetes Paternal Grandmother        Physical Exam:     Vitals:   Blood Pressure: 108/71 (09/22/19 1519)  Pulse: 100 (09/22/19 1519)  Temperature: 98 2 °F (36 8 °C) (09/22/19 1519)  Temp Source: Oral (09/22/19 1519)  Respirations: 18 (09/22/19 1519)  Height: 5' 4" (162 6 cm) (09/22/19 1519)  Weight - Scale: 60 9 kg (134 lb 4 2 oz) (09/22/19 0956)  SpO2: 97 % (09/22/19 1519)    Physical Exam   Constitutional: She is oriented to person, place, and time  She appears well-developed and well-nourished  No distress  Patient is sitting upright in the chair  She is in no acute distress  She does not appear toxic  HENT:   Head: Normocephalic and atraumatic  Eyes: Pupils are equal, round, and reactive to light  Conjunctivae and EOM are normal  Right eye exhibits no discharge  Left eye exhibits no discharge  No scleral icterus  Neck: Normal range of motion  Neck supple  No JVD present  No tracheal deviation present  Cardiovascular: Regular rhythm and normal heart sounds  Exam reveals no gallop and no friction rub  No murmur heard  Tachycardia   Pulmonary/Chest: Effort normal and breath sounds normal  No stridor  No respiratory distress  She has no wheezes  She has no rales  She exhibits no tenderness  Abdominal: Soft  Bowel sounds are normal  She exhibits distension  There is no tenderness  There is no guarding  Musculoskeletal: Normal range of motion  She exhibits edema (bilateral lower extremity edema)  She exhibits no tenderness or deformity  Neurological: She is alert and oriented to person, place, and time  She displays normal reflexes  No cranial nerve deficit or sensory deficit  She exhibits normal muscle tone  Coordination normal    Skin: Skin is warm and dry  Capillary refill takes less than 2 seconds  No rash noted  She is not diaphoretic  No erythema  No pallor  Psychiatric: Her behavior is normal  Thought content normal    Nursing note and vitals reviewed  Additional Data:     Lab Results: I have personally reviewed pertinent reports  Results from last 7 days   Lab Units 09/22/19  1059   WBC Thousand/uL 9 52   HEMOGLOBIN g/dL 13 3   HEMATOCRIT % 41 1   PLATELETS Thousands/uL 328   NEUTROS PCT % 68   LYMPHS PCT % 17   MONOS PCT % 8   EOS PCT % 6     Results from last 7 days   Lab Units 09/22/19  1059   POTASSIUM mmol/L 3 9   CHLORIDE mmol/L 110*   CO2 mmol/L 30   BUN mg/dL 9   CREATININE mg/dL 0 51*   CALCIUM mg/dL 7 2*   ALK PHOS U/L 55   ALT U/L 34   AST U/L 37           Imaging: I have personally reviewed pertinent reports  Vas Lower Limb Venous Duplex Study, Unilateral/limited    Result Date: 9/7/2019  Narrative:  [facility] REPORT [facility]INICAL: Indications: Left Swelling of Limb [R22 4]  Patient presents with left lower extremity edema for a couple days  Risk Factors The patient has no history of HTN, Diabetes, HLD, DVT or Recent Trauma  FINDINGS:  Segment  Right            Left              Impression       Impression       CFV      Normal (Patent)  Normal (Patent)     CONCLUSION: Impression: RIGHT LOWER LIMB LIMITED: Evaluation shows no evidence of thrombus in the common femoral vein  Doppler evaluation shows a normal response to augmentation maneuvers  LEFT LOWER LIMB: No evidence of acute or chronic deep vein thrombosis No evidence of superficial thrombophlebitis noted  Doppler evaluation shows a normal response to augmentation maneuvers  Popliteal, posterior tibial and anterior tibial arterial Doppler waveforms are triphasic  Technical findings were given to Dr Ma Pac  SIGNATURE: Electronically Signed by: Paula Gasca on 2019-09-07 10:50:12 AM        [facility] / [facility] Everywhere Records Reviewed: Yes     ** Please Note: This note has been constructed using a voice recognition system   **

## 2019-09-23 ENCOUNTER — APPOINTMENT (OUTPATIENT)
Dept: ULTRASOUND IMAGING | Facility: HOSPITAL | Age: 44
DRG: 700 | End: 2019-09-23
Payer: COMMERCIAL

## 2019-09-23 ENCOUNTER — APPOINTMENT (OUTPATIENT)
Dept: NON INVASIVE DIAGNOSTICS | Facility: HOSPITAL | Age: 44
DRG: 700 | End: 2019-09-23
Payer: COMMERCIAL

## 2019-09-23 PROBLEM — K13.79 UVULAR EDEMA: Status: ACTIVE | Noted: 2019-09-23

## 2019-09-23 LAB
ALBUMIN SERPL BCP-MCNC: 0.7 G/DL (ref 3.5–5)
ALP SERPL-CCNC: 48 U/L (ref 46–116)
ALT SERPL W P-5'-P-CCNC: 27 U/L (ref 12–78)
ANION GAP SERPL CALCULATED.3IONS-SCNC: 3 MMOL/L (ref 4–13)
APTT PPP: 29 SECONDS (ref 23–37)
AST SERPL W P-5'-P-CCNC: 25 U/L (ref 5–45)
BASOPHILS # BLD AUTO: 0.07 THOUSANDS/ΜL (ref 0–0.1)
BASOPHILS NFR BLD AUTO: 1 % (ref 0–1)
BILIRUB SERPL-MCNC: 0.3 MG/DL (ref 0.2–1)
BUN SERPL-MCNC: 14 MG/DL (ref 5–25)
C3 SERPL-MCNC: 127 MG/DL (ref 90–180)
C4 SERPL-MCNC: 30 MG/DL (ref 10–40)
CALCIUM SERPL-MCNC: 7.5 MG/DL (ref 8.3–10.1)
CHLORIDE SERPL-SCNC: 111 MMOL/L (ref 100–108)
CHOLEST SERPL-MCNC: 320 MG/DL (ref 50–200)
CO2 SERPL-SCNC: 27 MMOL/L (ref 21–32)
CREAT SERPL-MCNC: 0.54 MG/DL (ref 0.6–1.3)
EOSINOPHIL # BLD AUTO: 0.17 THOUSAND/ΜL (ref 0–0.61)
EOSINOPHIL NFR BLD AUTO: 2 % (ref 0–6)
ERYTHROCYTE [DISTWIDTH] IN BLOOD BY AUTOMATED COUNT: 12.5 % (ref 11.6–15.1)
GFR SERPL CREATININE-BSD FRML MDRD: 117 ML/MIN/1.73SQ M
GLUCOSE P FAST SERPL-MCNC: 92 MG/DL (ref 65–99)
GLUCOSE SERPL-MCNC: 92 MG/DL (ref 65–140)
HBV CORE AB SER QL: NORMAL
HBV CORE IGM SER QL: NORMAL
HBV SURFACE AG SER QL: NORMAL
HCT VFR BLD AUTO: 37.9 % (ref 34.8–46.1)
HCV AB SER QL: NORMAL
HDLC SERPL-MCNC: 93 MG/DL (ref 40–60)
HGB BLD-MCNC: 12.5 G/DL (ref 11.5–15.4)
IMM GRANULOCYTES # BLD AUTO: 0.05 THOUSAND/UL (ref 0–0.2)
IMM GRANULOCYTES NFR BLD AUTO: 1 % (ref 0–2)
INR PPP: 1.01 (ref 0.84–1.19)
LDLC SERPL CALC-MCNC: 210 MG/DL (ref 0–100)
LYMPHOCYTES # BLD AUTO: 2.1 THOUSANDS/ΜL (ref 0.6–4.47)
LYMPHOCYTES NFR BLD AUTO: 20 % (ref 14–44)
MCH RBC QN AUTO: 30.2 PG (ref 26.8–34.3)
MCHC RBC AUTO-ENTMCNC: 33 G/DL (ref 31.4–37.4)
MCV RBC AUTO: 92 FL (ref 82–98)
MONOCYTES # BLD AUTO: 1 THOUSAND/ΜL (ref 0.17–1.22)
MONOCYTES NFR BLD AUTO: 10 % (ref 4–12)
NEUTROPHILS # BLD AUTO: 6.94 THOUSANDS/ΜL (ref 1.85–7.62)
NEUTS SEG NFR BLD AUTO: 66 % (ref 43–75)
NONHDLC SERPL-MCNC: 227 MG/DL
NRBC BLD AUTO-RTO: 0 /100 WBCS
PLATELET # BLD AUTO: 328 THOUSANDS/UL (ref 149–390)
PMV BLD AUTO: 8.7 FL (ref 8.9–12.7)
POTASSIUM SERPL-SCNC: 3.8 MMOL/L (ref 3.5–5.3)
PROT SERPL-MCNC: 4.2 G/DL (ref 6.4–8.2)
PROTHROMBIN TIME: 12.7 SECONDS (ref 11.6–14.5)
RBC # BLD AUTO: 4.14 MILLION/UL (ref 3.81–5.12)
SODIUM SERPL-SCNC: 141 MMOL/L (ref 136–145)
TRIGL SERPL-MCNC: 86 MG/DL
WBC # BLD AUTO: 10.33 THOUSAND/UL (ref 4.31–10.16)

## 2019-09-23 PROCEDURE — 84165 PROTEIN E-PHORESIS SERUM: CPT | Performed by: NURSE PRACTITIONER

## 2019-09-23 PROCEDURE — 93306 TTE W/DOPPLER COMPLETE: CPT

## 2019-09-23 PROCEDURE — 93306 TTE W/DOPPLER COMPLETE: CPT | Performed by: INTERNAL MEDICINE

## 2019-09-23 PROCEDURE — 86704 HEP B CORE ANTIBODY TOTAL: CPT | Performed by: NURSE PRACTITIONER

## 2019-09-23 PROCEDURE — 86038 ANTINUCLEAR ANTIBODIES: CPT | Performed by: NURSE PRACTITIONER

## 2019-09-23 PROCEDURE — 83883 ASSAY NEPHELOMETRY NOT SPEC: CPT | Performed by: NURSE PRACTITIONER

## 2019-09-23 PROCEDURE — 99253 IP/OBS CNSLTJ NEW/EST LOW 45: CPT | Performed by: PHYSICIAN ASSISTANT

## 2019-09-23 PROCEDURE — 84166 PROTEIN E-PHORESIS/URINE/CSF: CPT | Performed by: INTERNAL MEDICINE

## 2019-09-23 PROCEDURE — 99222 1ST HOSP IP/OBS MODERATE 55: CPT | Performed by: INTERNAL MEDICINE

## 2019-09-23 PROCEDURE — 86160 COMPLEMENT ANTIGEN: CPT | Performed by: NURSE PRACTITIONER

## 2019-09-23 PROCEDURE — 86255 FLUORESCENT ANTIBODY SCREEN: CPT | Performed by: NURSE PRACTITIONER

## 2019-09-23 PROCEDURE — 99225 PR SBSQ OBSERVATION CARE/DAY 25 MINUTES: CPT | Performed by: INTERNAL MEDICINE

## 2019-09-23 PROCEDURE — 85610 PROTHROMBIN TIME: CPT | Performed by: NURSE PRACTITIONER

## 2019-09-23 PROCEDURE — 87340 HEPATITIS B SURFACE AG IA: CPT | Performed by: NURSE PRACTITIONER

## 2019-09-23 PROCEDURE — 86803 HEPATITIS C AB TEST: CPT | Performed by: NURSE PRACTITIONER

## 2019-09-23 PROCEDURE — 86705 HEP B CORE ANTIBODY IGM: CPT | Performed by: NURSE PRACTITIONER

## 2019-09-23 PROCEDURE — 80053 COMPREHEN METABOLIC PANEL: CPT | Performed by: INTERNAL MEDICINE

## 2019-09-23 PROCEDURE — NC001 PR NO CHARGE: Performed by: RADIOLOGY

## 2019-09-23 PROCEDURE — 85730 THROMBOPLASTIN TIME PARTIAL: CPT | Performed by: NURSE PRACTITIONER

## 2019-09-23 PROCEDURE — 84166 PROTEIN E-PHORESIS/URINE/CSF: CPT | Performed by: PATHOLOGY

## 2019-09-23 PROCEDURE — 76770 US EXAM ABDO BACK WALL COMP: CPT

## 2019-09-23 PROCEDURE — 84165 PROTEIN E-PHORESIS SERUM: CPT | Performed by: PATHOLOGY

## 2019-09-23 PROCEDURE — 85025 COMPLETE CBC W/AUTO DIFF WBC: CPT | Performed by: INTERNAL MEDICINE

## 2019-09-23 PROCEDURE — 80061 LIPID PANEL: CPT | Performed by: INTERNAL MEDICINE

## 2019-09-23 PROCEDURE — 82595 ASSAY OF CRYOGLOBULIN: CPT | Performed by: NURSE PRACTITIONER

## 2019-09-23 RX ORDER — ATORVASTATIN CALCIUM 20 MG/1
20 TABLET, FILM COATED ORAL
Status: DISCONTINUED | OUTPATIENT
Start: 2019-09-23 | End: 2019-09-26 | Stop reason: HOSPADM

## 2019-09-23 RX ORDER — TORSEMIDE 20 MG/1
10 TABLET ORAL DAILY
Status: DISCONTINUED | OUTPATIENT
Start: 2019-09-24 | End: 2019-09-24

## 2019-09-23 RX ADMIN — ATORVASTATIN CALCIUM 20 MG: 20 TABLET, FILM COATED ORAL at 16:50

## 2019-09-23 RX ADMIN — ENOXAPARIN SODIUM 40 MG: 40 INJECTION SUBCUTANEOUS at 08:53

## 2019-09-23 NOTE — PROGRESS NOTES
IR referral received for renal biopsy in this patient with nephrotic syndrome    Biopsy is indicated    - recommend evaluation of airway status by ENT and would like their opinion regarding candidacy for sedation (or do we need anesthesia if her airway may be edematous)    Plan for IR renal biopsy Wednesday as add-on status  - will decide sedation vs anesthesia as above    - hold [facility] Tuesday night  - NPO after midnight Tues

## 2019-09-23 NOTE — CONSULTS
Consultation - ENT   Miracle Peter 45 y o  female MRN: [id number]  Unit/Bed#: -01 Encounter: [id number]      Assessment/Plan     Assessment: Recent uvular swelling, now resolved, likely in conjunction with anasarca and nephrotic syndrome  Doubt angioedema, although some labs still pending  Presently no need for ENT intervention  Plan:  As per Medicine and Nephrology  If significant uvular swelling recurs, please feel free to contact our office  History of Present Illness   Physician Requesting Consult: Obie Thornton MD  Reason for Consult / Principal Problem: Uvular Edema    HPI: Miracle Peter is a 45y o  year old female admitted to [address] [place] [address] for c/o swelling of both lower extremities following a "GI virus" approximately 1 month ago  Swelling progressively worsened, resulting in a reported 20 pound weight gain  She sought evaluation by her PCP, who ordered lab studies, which revealed proteinuria and hypoalbuminemia  She was referred to Nephrology, earliest appointment was in late October  Unfortunately, the swelling progressively worsened, and the patient awoke yesterday with swelling involving her face, neck, and uvula  She presented to the ER in the afternoon, was treated with IV Decadron, and now admits to significant improvement  Consults    Review of Systems    Historical Information   History reviewed  No pertinent past medical history    Past Surgical History:   Procedure Laterality Date    TUBAL LIGATION      WISDOM TOOTH EXTRACTION       Social History   Social History     Substance and Sexual Activity   Alcohol Use Not Currently     Social History     Substance and Sexual Activity   Drug Use Never     Social History     Tobacco Use   Smoking Status Current Every Day Smoker    Packs/day: 0 25   Smokeless Tobacco Never Used     Family History: non-contributory    Meds/Allergies   all current active meds have been reviewed    No Known Allergies    Objective       Intake/Output Summary (Last 24 hours) at 9/23/2019 1229  Last data filed at 9/22/2019 1755  Gross per 24 hour   Intake 120 ml   Output --   Net 120 ml       Invasive Devices     Peripheral Intravenous Line            Peripheral IV 09/22/19 Right Antecubital 1 day                Physical Exam Awake, alert, OOB, ambulatory in her room  The patient is in no acute distress  Speech is clear  No cough, stridor, or wheezing  No apparent facial or lip swelling  EAC's patent, TM's intact AU  Nares patent  Oropharynx clear, moist, uvula midline without edema or erythema  Tongue mobile  No palpable cervical lymphadenopathy or appreciable masses or edema  Trachea grossly midline  Lab Results: I have personally reviewed pertinent lab results      Imaging Studies:   EKG, Pathology, and Other Studies:

## 2019-09-23 NOTE — PROGRESS NOTES
Progress Note - Mark Jamison 1975, 45 y o  female MRN: [id number]    Unit/Bed#: -01 Encounter: [id number]    Primary Care Provider: No primary care provider on file  Date and time admitted to hospital: 9/22/2019  9:51 AM        * Bilateral edema of lower extremity  Assessment & Plan  · Patient is a 45year old female, previously healthy, who developed gastroenteritis almost 4 weeks ago with nausea, vomiting and diarrhea  She then developed bilateral lower extremity edema which got progressively worse, especially after standing for several hours  This, then, progressed to anasarca  Patient states she has gained over 20 lbs in less than 3 weeks  Patient went to see her PCP who ordered lab tests that showed hypoalbumineamia (0 9 g/dl), Proteinuria (>300 mg/dl)  · Lipid panel showed:  · Cholesterol 320; TG 86; HDL 93;   · Proteinuria, edema, and hypoalbuminemia, with hyperlipidemia points towards nephrotic syndrome  Plan:  · Consult Nephrology, input appreciated:  · ZAYNAB, DsDNA, C3, C4 to evaluate for SLE  · Hepatitis panel to evaluate for Hep C and B  · Retroperitoneal US  · Monitor I/O  · Will need renal biopsy to be scheduled while inpatient  · Will hold off ACE I and diuretics for now as her blood pressure is running on the lower side and patient swelling improved since admission  · If swelling worsened and blood pressure permits, can start low dose of lasix   · Consult Nutrition   · Diet: High protein and High carbohydrates, Na < 2 gr/day, fluid restriction <1500  · Started on Atorvastatin 20mg daily  · DVT Prophylaxis  · Monitor Weight  · Monitor CBC and CMP  Hypoalbuminemia  Assessment & Plan  Albumin on admission is 0 9 g/dl  Current albumin 0 7 (see above for more details)  Plan:  · Consult Nephrology  See plans above    Proteinuria  Assessment & Plan  Urine macroscopic study on admission shows Urine Protein >300 mg/dl   This, in combination with patient's other symptoms and lab results, is suggestive of nephrotic syndrome  (see plan above for more details)    Uvular edema  Assessment & Plan  Patient presented with history of uvular edema  Was given one dose of IV Decadron at the ED  Patient responded well  With slight uvular edema on exam, no complaints of dypsnea  · Seen by ENT, for now will await further tests requested  · No further intervention for now      VTE Pharmacologic Prophylaxis:   Pharmacologic: Enoxaparin (Lovenox)  Mechanical VTE Prophylaxis in Place: Yes    Discussions with Specialists or Other Care Team Provider: Discussed with nurse, attending, Nephro resident    Education and Discussions with Family / Patient: Discussed with patient    Current Length of Stay: 0 day(s)    Current Patient Status: Observation     Discharge Plan / Estimated Discharge Date: TBD    Code Status: Level 1 - Full Code      Subjective:   Patient seen and examined today  Patient noted decrease in facial swelling  Still with bilateral leg edema, however slightly improved from initial presentation  No chest pain, no dyspnea, slight abdominal distention however also improved from before  No difficulty swallowing and choking episodes reported  Objective:     Vitals:   Temp (24hrs), Av 9 °F (36 6 °C), Min:97 9 °F (36 6 °C), Max:97 9 °F (36 6 °C)    Temp:  [97 9 °F (36 6 °C)] 97 9 °F (36 6 °C)  HR:  [85-96] 86  Resp:  [18] 18  BP: (104-111)/(63-67) 111/67  SpO2:  [95 %-97 %] 97 %  Body mass index is 23 41 kg/m²  Input and Output Summary (last 24 hours): Intake/Output Summary (Last 24 hours) at 2019 1530  Last data filed at 2019 1401  Gross per 24 hour   Intake 600 ml   Output --   Net 600 ml       Physical Exam:     Physical Exam   Constitutional: She is oriented to person, place, and time  She appears well-developed and well-nourished  No distress  HENT:   Head: Normocephalic and atraumatic  Mouth/Throat: Oropharynx is clear and moist  No oropharyngeal exudate     No periorbital swelling, No lip swelling   Eyes: Pupils are equal, round, and reactive to light  Conjunctivae are normal  No scleral icterus  Neck: Normal range of motion  No JVD present  Cardiovascular: Normal rate, regular rhythm, normal heart sounds and intact distal pulses  Pulmonary/Chest: Effort normal and breath sounds normal  No respiratory distress  Abdominal: Soft  Bowel sounds are normal  There is no tenderness  There is no guarding  Musculoskeletal: Normal range of motion  She exhibits edema (bilateral LE)  She exhibits no tenderness or deformity  Lymphadenopathy:     She has no cervical adenopathy  Neurological: She is alert and oriented to person, place, and time  No cranial nerve deficit or sensory deficit  She exhibits normal muscle tone  Skin: Skin is warm  No rash noted  She is not diaphoretic  No erythema  Psychiatric: She has a normal mood and affect  Her behavior is normal  Thought content normal    Nursing note and vitals reviewed  Additional Data:     Labs:    Results from last 7 days   Lab Units 09/23/19  0458   WBC Thousand/uL 10 33*   HEMOGLOBIN g/dL 12 5   HEMATOCRIT % 37 9   PLATELETS Thousands/uL 328   NEUTROS PCT % 66   LYMPHS PCT % 20   MONOS PCT % 10   EOS PCT % 2     Results from last 7 days   Lab Units 09/23/19  0458   POTASSIUM mmol/L 3 8   CHLORIDE mmol/L 111*   CO2 mmol/L 27   BUN mg/dL 14   CREATININE mg/dL 0 54*   CALCIUM mg/dL 7 5*   ALK PHOS U/L 48   ALT U/L 27   AST U/L 25     Results from last 7 days   Lab Units 09/23/19  0903   INR  1 01       * I Have Reviewed All Lab Data Listed Above  * Additional Pertinent Lab Tests Reviewed:  [facility] Admission Reviewed    Imaging:    Imaging Reports Reviewed Today Include: None  Imaging Personally Reviewed by Myself Includes:  None    Recent Cultures (last 7 days):           Last 24 Hours Medication List:     Current Facility-Administered Medications:  acetaminophen 650 mg Oral Q6H PRN Chica Sainz MD   atorvastatin 20 mg Oral Daily With Mary Gómez MD   enoxaparin 40 mg Subcutaneous Q24H Omar Millard MD   nicotine 1 patch Transdermal Daily Chica Sainz MD   ondansetron 4 mg Intravenous Q6H PRN Chica Sainz MD        Today, Patient Was Seen By: Lisa Davidson MD    ** Please Note: This note has been constructed using a voice recognition system   **

## 2019-09-23 NOTE — CONSULTS
[address] 45 y o  female MRN: [id number]  Unit/Bed#: -01 Encounter: [id number]    ASSESSMENT and PLAN:  #Nephrotic syndrome:  Patient with bilateral lower edema + proteinuria + hypoalbuminemia + high cholesterol which all support nephrotic syndrome diagnosis    DDX: membranous nephropathy vs focal segmental glomerulonephritis vs minimal changes disease    Work up:  · ZAYNAB, DsDNA, C3, C4 to evaluate for SLE  · Hepatitis panel to evaluate for Hep C and B  · Retroperitoneal US  · Monitor I/O  · Will need renal biopsy to be scheduled while inpatient     Plan:  · Fluid restriction with Na restriction (< 2g/l)  · Will hold off ACE I and diuretics for now as her blood pressure is running on the lower side and patient swelling improved since admission  · If swelling worsened and blood pressure permits, can start low dose of lasix   · Consider albumin 25 mg IV if Na restriction and diuretics fail to improve edema  · Continue prophylactic anticoagulation   · Consider starting statin       SUMMARY OF RECOMMENDATIONS:  · Na restriction  · Consider lasix if edema worsened and blood pressure permits   · Renal biopsy will be scheduled with IR     HISTORY OF PRESENT ILLNESS:  Requesting Physician: Alex Barraza MD  Reason for Consult: Proteinuria, Anasarca, bilateral edema of lower extremity     Inessa Capellan is a 45 y o  female who was admitted to the hospital after presenting with bilateral lower extremity edema that started 1 month ago with 15-20 pounds weight gain and worsened in the last week prior to admission, it's associated with periorbital, facial and abdominal swelling  Patient saw her PCP for this and was referred to nephrology however no early appointments were available  On day of admission, patient reported shortness of breath, lips and facial edema with bilateral leg edema for which she was admitted for further evaluation       Patient reported having gastroenteritis symptoms for 2 days started a month ago after she ate at a restaurant which has been resolved  On admission, patient noted to have proteinuria of >300 with albumin of 0 7  Patient denies shortness of breath on exertion or orthopia  She denies similar condition prior to 1 month ago  Patient denies history if IV drug use, blood transfusion  She also denies arthralgia or photosensitivity, or malar rash and no history of NSAIDS use  As for uvular edema, patient denies any food or drug allergy, no change In her regular diet, no previous use of ACE I  When seen and examined this morning, patient denied any more SOB, no facial, lips or periorbital swelling noted  A picture of herself yesterday showed significant swelling of her face, neck and lips with periorbital edema  She still has some bilateral lower extremity swelling  A renal consultation is requested today for assistance in the management of proteinuria, hypoalbuminemia and anasarca  PAST MEDICAL HISTORY:  History reviewed  No pertinent past medical history      PAST SURGICAL HISTORY:  Past Surgical History:   Procedure Laterality Date    TUBAL LIGATION      WISDOM TOOTH EXTRACTION         ALLERGIES:  No Known Allergies    SOCIAL HISTORY:  Social History     Substance and Sexual Activity   Alcohol Use Not Currently     Social History     Substance and Sexual Activity   Drug Use Never     Social History     Tobacco Use   Smoking Status Current Every Day Smoker    Packs/day: 0 25   Smokeless Tobacco Never Used       FAMILY HISTORY:  Family History   Problem Relation Age of Onset    No Known Problems Father         CHF at age 48    Diabetes Paternal Grandmother        MEDICATIONS:    Current Facility-Administered Medications:     acetaminophen (TYLENOL) tablet 650 mg, 650 mg, Oral, Q6H PRN, Hayley Lala MD    atorvastatin (LIPITOR) tablet 20 mg, 20 mg, Oral, Daily With Jaylen Ly MD    enoxaparin (LOVENOX) subcutaneous injection 40 mg, 40 mg, Subcutaneous, Q24H [address], Neeta Rodriguez MD, 40 mg at 09/23/19 0853    nicotine (NICODERM CQ) 7 mg/24hr TD 24 hr patch 1 patch, 1 patch, Transdermal, Daily, Neeta Rodriguez MD    ondansetron [facility] injection 4 mg, 4 mg, Intravenous, Q6H PRN, Neeta Rodriguez MD    REVIEW OF SYSTEMS:  Constitutional: Negative for fatigue, anorexia, fever, chills, diaphoresis  HENT: Negative for postnasal drip  Eyes: Negative for visual disturbance  Respiratory: Negative for cough, shortness of breath and wheezing  Cardiovascular: Negative for chest pain, palpitations and leg swelling  Gastrointestinal: Negative for abdominal pain, constipation, diarrhea, nausea and vomiting  Genitourinary: No dysuria, hematuria  Endocrine: Negative for polyuria  Musculoskeletal: Negative for arthralgias, back pain and joint swelling  Skin: Negative for rash  Neurological: Negative for focal weakness, headaches, dizziness  Hematological: Negative for easy bruising or bleeding  Psychiatric/Behavioral: Negative for confusion and sleep disturbance  All the systems were reviewed and were negative except as documented on the HPI  PHYSICAL EXAM:  Current Weight: Weight - Scale: 61 9 kg (136 lb 6 4 oz)  First Weight: Weight - Scale: 60 9 kg (134 lb 4 2 oz)  Vitals:    09/22/19 1519 09/22/19 2233 09/23/19 0600 09/23/19 0751   BP: 108/71 106/63  104/65   BP Location: Left arm Left arm  Left arm   Pulse: 100 85  96   Resp: 18 18  18   Temp: 98 2 °F (36 8 °C) 97 9 °F (36 6 °C)  97 9 °F (36 6 °C)   TempSrc: Oral Oral  Oral   SpO2: 97% 95%  97%   Weight:   61 9 kg (136 lb 6 4 oz)    Height: 5' 4" (1 626 m)  5' 4" (1 626 m)        Intake/Output Summary (Last 24 hours) at 9/23/2019 1144  Last data filed at 9/22/2019 1755  Gross per 24 hour   Intake 120 ml   Output --   Net 120 ml     Physical Exam   Constitutional: She is oriented to person, place, and time  She appears well-developed  No distress  HENT:   Head: Normocephalic  Eyes: No scleral icterus  Neck: Neck supple  No JVD present  Cardiovascular: Normal rate, regular rhythm and normal heart sounds  No murmur heard  Pulmonary/Chest: Effort normal and breath sounds normal  No respiratory distress  She has no wheezes  She has no rales  Abdominal: Soft  Bowel sounds are normal  She exhibits no distension  There is no tenderness  Musculoskeletal: She exhibits edema  Bilateral lower extremity pitting edema +2   Neurological: She is alert and oriented to person, place, and time  Skin: Skin is warm and dry  Capillary refill takes less than 2 seconds  She is not diaphoretic  No erythema  No pallor  Psychiatric: She has a normal mood and affect  Her behavior is normal    Vitals reviewed          Invasive Devices:      Lab Results:   Results from last 7 days   Lab Units 09/23/19  0458 09/22/19  1059   WBC Thousand/uL 10 33* 9 52   HEMOGLOBIN g/dL 12 5 13 3   HEMATOCRIT % 37 9 41 1   PLATELETS Thousands/uL 328 328   POTASSIUM mmol/L 3 8 3 9   CHLORIDE mmol/L 111* 110*   CO2 mmol/L 27 30   BUN mg/dL 14 9   CREATININE mg/dL 0 54* 0 51*   CALCIUM mg/dL 7 5* 7 2*   ALK PHOS U/L 48 55   ALT U/L 27 34   AST U/L 25 37

## 2019-09-23 NOTE — ASSESSMENT & PLAN NOTE
Urine macroscopic study on admission shows Urine Protein >300 mg/dl   This, in combination with patient's other symptoms and lab results, is suggestive of nephrotic syndrome  (see plan above for more details)

## 2019-09-23 NOTE — ASSESSMENT & PLAN NOTE
Patient presented with history of uvular edema  Was given one dose of IV Decadron at the ED  Patient responded well    With slight uvular edema on exam, no complaints of dypsnea  · Seen by ENT, for now will await further tests requested  · No further intervention for now

## 2019-09-23 NOTE — UTILIZATION REVIEW
Initial Clinical Review    Admission: Date/Time/Statement: 9/22 @ 1359 OBSERVATION    Orders Placed This Encounter   Procedures    Place in Observation     Standing Status:   Standing     Number of Occurrences:   1     Order Specific Question:   Admitting Physician     Answer:   Steve Shipman [[id number]]     Order Specific Question:   Level of Care     Answer:   Med Surg [[id number]]     ED Arrival Information     Expected Arrival Acuity Means of Arrival Escorted By Service Admission Type    - 9/22/2019 09:42 Urgent Walk-In Family Member General Medicine Urgent    Arrival Complaint    [address]         Chief Complaint   Patient presents with    Facial Swelling     facial swelling with trouble swallowing  Onset this morning     Assessment/Plan: 44 y/o female presents to ED from home with intermittent facial swelling, throat swelling over past month  Started after viral type episode of nausea/vomiting/diarrhea  Also reports worsening LE edema, abd swelling  On exam, uvula mildly swollen, +2 LE edema  Received decadron in ED with improvement in uvular edema  Admitted as observation due to LE edema, hypoalbuminemia, proteinuria  Likely nephrotic syndrome  Consult nephrology  Echo  High protein and High carbohydrates, Na < 2 gr/day, fluid restriction <1500   9/23 Nephrology consult:  Nephrotic syndrome  Differential includes membranous nephropathy, minimal change disease, focal segmental glomerulosclerosis and amyloidosis  Serologic workup and renal bx      ED Triage Vitals   Temperature Pulse Respirations Blood Pressure SpO2   09/22/19 0956 09/22/19 0956 09/22/19 0956 09/22/19 0956 09/22/19 0956   98 4 °F (36 9 °C) (!) 112 16 145/70 98 %      Temp Source Heart Rate Source Patient Position - Orthostatic VS BP Location FiO2 (%)   09/22/19 1519 09/22/19 0956 09/22/19 0956 09/22/19 0956 --   Oral Monitor Sitting Right arm       Pain Score       09/22/19 0956       No Pain        Wt Readings from Last 1 Encounters: 09/23/19 61 9 kg (136 lb 6 4 oz)     Additional Vital Signs:   09/23/19 0751  97 9 °F (36 6 °C)  96  18  104/65  79  97 %  None (Room air)   09/22/19 2233  97 9 °F (36 6 °C)  85  18  106/63  77  95 %  None (Room air)   09/22/19 1519  98 2 °F (36 8 °C)  100  18  108/71  --  97 %  None (Room air)   09/22/19 1334  --  94  16  116/75  --  97 %  None (Room air)   09/22/19 1202  --  92  16  112/73  --  100 %  None (Room air)       Pertinent Labs/Diagnostic Test Results:   Results from last 7 days   Lab Units 09/23/19  0458 09/22/19  1059   WBC Thousand/uL 10 33* 9 52   HEMOGLOBIN g/dL 12 5 13 3   HEMATOCRIT % 37 9 41 1   PLATELETS Thousands/uL 328 328   NEUTROS ABS Thousands/µL 6 94 6 48         Results from last 7 days   Lab Units 09/23/19  0458 09/22/19  1059   SODIUM mmol/L 141 143   POTASSIUM mmol/L 3 8 3 9   CHLORIDE mmol/L 111* 110*   CO2 mmol/L 27 30   ANION GAP mmol/L 3* 3*   BUN mg/dL 14 9   CREATININE mg/dL 0 54* 0 51*   EGFR ml/min/1 73sq m 117 119   CALCIUM mg/dL 7 5* 7 2*     Results from last 7 days   Lab Units 09/23/19  0458 09/22/19  1059   AST U/L 25 37   ALT U/L 27 34   ALK PHOS U/L 48 55   TOTAL PROTEIN g/dL 4 2* 4 7*   ALBUMIN g/dL 0 7* 0 9*   TOTAL BILIRUBIN mg/dL 0 30 0 40         Results from last 7 days   Lab Units 09/23/19  0458 09/22/19  1059   GLUCOSE RANDOM mg/dL 92 85     Results from last 7 days   Lab Units 09/23/19  0903   PROTIME seconds 12 7   INR  1 01   PTT seconds 29       Results from last 7 days   Lab Units 09/22/19  1059   NT-PRO BNP pg/mL 214*     Results from last 7 days   Lab Units 09/22/19  1212   CLARITY UA  Clear   COLOR UA  Yellow   SPEC GRAV UA  1 025   PH UA  8 5*   GLUCOSE UA mg/dl Negative   KETONES UA mg/dl Negative   BLOOD UA  Negative   PROTEIN UA mg/dl >=300*   NITRITE UA  Negative   BILIRUBIN UA  Negative   UROBILINOGEN UA E U /dl 0 2   LEUKOCYTES UA  Negative   WBC UA /hpf None Seen   RBC UA /hpf None Seen   BACTERIA UA /hpf Occasional   EPITHELIAL CELLS WET PREP /hpf Occasional   MUCUS THREADS  Occasional*     Renal US pending  Echo pending    ED Treatment:   Medication Administration from 09/22/2019 0942 to 09/22/2019 1439       Date/Time Order Dose Route Action Action by Comments     09/22/2019 1101 dexamethasone (DECADRON) injection 10 mg 10 mg Intravenous Given Marylen Carbo, RN         History reviewed  No pertinent past medical history  Present on Admission:   Bilateral edema of lower extremity   Hypoalbuminemia   Proteinuria   Elevated brain natriuretic peptide (BNP) level      Admitting Diagnosis: Proteinuria [R80 9]  Anasarca [R60 1]  Hypoalbuminemia [E88 09]  Uvular edema [K13 79]  Age/Sex: 45 y o  female  Admission Orders:    Current Facility-Administered Medications:  acetaminophen 650 mg Oral Q6H PRN Devorah Perera MD   enoxaparin 40 mg Subcutaneous Q24H [facility] Devorah Perera MD   nicotine 1 patch Transdermal Daily Devorah Perera MD   ondansetron 4 mg Intravenous Q6H PRN Devorah Perera MD       IP CONSULT TO NEPHROLOGY  IP CONSULT TO Sukumar Medrano   SCDs  VS  Renal US  Echo    Network Utilization Review Department  Phone: [phone]; Fax [phone]  [email or web address]  ATTENTION: Please call with any questions or concerns to [phone]  and carefully listen to the prompts so that you are directed to the right person  Send all requests for admission clinical reviews, approved or denied determinations and any other requests to fax [phone]   All voicemails are confidential

## 2019-09-23 NOTE — ASSESSMENT & PLAN NOTE
Albumin on admission is 0 9 g/dl  Current albumin 0 7 (see above for more details)  Plan:  · Consult Nephrology   See plans above

## 2019-09-23 NOTE — ASSESSMENT & PLAN NOTE
· Patient is a 45year old female, previously healthy, who developed gastroenteritis almost 4 weeks ago with nausea, vomiting and diarrhea  She then developed bilateral lower extremity edema which got progressively worse, especially after standing for several hours  This, then, progressed to anasarca  Patient states she has gained over 20 lbs in less than 3 weeks  Patient went to see her PCP who ordered lab tests that showed hypoalbumineamia (0 9 g/dl), Proteinuria (>300 mg/dl)  · Lipid panel showed:  · Cholesterol 320; TG 86; HDL 93;   · Proteinuria, edema, and hypoalbuminemia, with hyperlipidemia points towards nephrotic syndrome  Plan:  · Consult Nephrology, input appreciated:  · ZAYNAB, DsDNA, C3, C4 to evaluate for SLE  · Hepatitis panel to evaluate for Hep C and B  · Retroperitoneal US  · Monitor I/O  · Will need renal biopsy to be scheduled while inpatient  · Will hold off ACE I and diuretics for now as her blood pressure is running on the lower side and patient swelling improved since admission  · If swelling worsened and blood pressure permits, can start low dose of lasix   · Consult Nutrition   · Diet: High protein and High carbohydrates, Na < 2 gr/day, fluid restriction <1500  · Started on Atorvastatin 20mg daily  · DVT Prophylaxis  · Monitor Weight  · Monitor CBC and CMP

## 2019-09-24 LAB
25(OH)D3 SERPL-MCNC: 12.5 NG/ML (ref 30–100)
ALBUMIN SERPL BCP-MCNC: 0.8 G/DL (ref 3.5–5)
ALBUMIN SERPL ELPH-MCNC: 1.67 G/DL (ref 3.5–5)
ALBUMIN SERPL ELPH-MCNC: 39.7 % (ref 52–65)
ALBUMIN UR ELPH-MCNC: 80 %
ALP SERPL-CCNC: 45 U/L (ref 46–116)
ALPHA1 GLOB MFR UR ELPH: 7.5 %
ALPHA1 GLOB SERPL ELPH-MCNC: 0.19 G/DL (ref 0.1–0.4)
ALPHA1 GLOB SERPL ELPH-MCNC: 4.6 % (ref 2.5–5)
ALPHA2 GLOB MFR UR ELPH: 3.1 %
ALPHA2 GLOB SERPL ELPH-MCNC: 1.14 G/DL (ref 0.4–1.2)
ALPHA2 GLOB SERPL ELPH-MCNC: 27.2 % (ref 7–13)
ALT SERPL W P-5'-P-CCNC: 29 U/L (ref 12–78)
ANION GAP SERPL CALCULATED.3IONS-SCNC: 4 MMOL/L (ref 4–13)
AST SERPL W P-5'-P-CCNC: 30 U/L (ref 5–45)
B-GLOBULIN MFR UR ELPH: 5.7 %
BETA GLOB ABNORMAL SERPL ELPH-MCNC: 0.19 G/DL (ref 0.4–0.8)
BETA1 GLOB SERPL ELPH-MCNC: 4.6 % (ref 5–13)
BETA2 GLOB SERPL ELPH-MCNC: 10.1 % (ref 2–8)
BETA2+GAMMA GLOB SERPL ELPH-MCNC: 0.42 G/DL (ref 0.2–0.5)
BILIRUB SERPL-MCNC: 0.3 MG/DL (ref 0.2–1)
BUN SERPL-MCNC: 15 MG/DL (ref 5–25)
CALCIUM SERPL-MCNC: 7.5 MG/DL (ref 8.3–10.1)
CHLORIDE SERPL-SCNC: 110 MMOL/L (ref 100–108)
CO2 SERPL-SCNC: 26 MMOL/L (ref 21–32)
CREAT SERPL-MCNC: 0.6 MG/DL (ref 0.6–1.3)
ERYTHROCYTE [DISTWIDTH] IN BLOOD BY AUTOMATED COUNT: 12.7 % (ref 11.6–15.1)
FERRITIN SERPL-MCNC: 57 NG/ML (ref 8–388)
GAMMA GLOB ABNORMAL SERPL ELPH-MCNC: 0.58 G/DL (ref 0.5–1.6)
GAMMA GLOB MFR UR ELPH: 3.7 %
GAMMA GLOB SERPL ELPH-MCNC: 13.8 % (ref 12–22)
GFR SERPL CREATININE-BSD FRML MDRD: 113 ML/MIN/1.73SQ M
GLUCOSE SERPL-MCNC: 88 MG/DL (ref 65–140)
HCG SERPL QL: NEGATIVE
HCT VFR BLD AUTO: 37.1 % (ref 34.8–46.1)
HGB BLD-MCNC: 12.1 G/DL (ref 11.5–15.4)
IGG/ALB SER: 0.66 {RATIO} (ref 1.1–1.8)
IRON SATN MFR SERPL: 40 %
IRON SERPL-MCNC: 48 UG/DL (ref 50–170)
KAPPA LC FREE SER-MCNC: 25 MG/L (ref 3.3–19.4)
KAPPA LC FREE/LAMBDA FREE SER: 0.88 {RATIO} (ref 0.26–1.65)
LAMBDA LC FREE SERPL-MCNC: 28.3 MG/L (ref 5.7–26.3)
MCH RBC QN AUTO: 30 PG (ref 26.8–34.3)
MCHC RBC AUTO-ENTMCNC: 32.6 G/DL (ref 31.4–37.4)
MCV RBC AUTO: 92 FL (ref 82–98)
PLATELET # BLD AUTO: 292 THOUSANDS/UL (ref 149–390)
PMV BLD AUTO: 8.8 FL (ref 8.9–12.7)
POTASSIUM SERPL-SCNC: 4.1 MMOL/L (ref 3.5–5.3)
PROT PATTERN SERPL ELPH-IMP: ABNORMAL
PROT PATTERN UR ELPH-IMP: ABNORMAL
PROT SERPL-MCNC: 4.2 G/DL (ref 6.4–8.2)
PROT SERPL-MCNC: 4.2 G/DL (ref 6.4–8.2)
PROT UR-MCNC: 613 MG/DL
RBC # BLD AUTO: 4.04 MILLION/UL (ref 3.81–5.12)
SODIUM SERPL-SCNC: 140 MMOL/L (ref 136–145)
TIBC SERPL-MCNC: 119 UG/DL (ref 250–450)
WBC # BLD AUTO: 7.9 THOUSAND/UL (ref 4.31–10.16)

## 2019-09-24 PROCEDURE — 82728 ASSAY OF FERRITIN: CPT | Performed by: INTERNAL MEDICINE

## 2019-09-24 PROCEDURE — 83540 ASSAY OF IRON: CPT | Performed by: INTERNAL MEDICINE

## 2019-09-24 PROCEDURE — 99232 SBSQ HOSP IP/OBS MODERATE 35: CPT | Performed by: HOSPITALIST

## 2019-09-24 PROCEDURE — 83516 IMMUNOASSAY NONANTIBODY: CPT | Performed by: INTERNAL MEDICINE

## 2019-09-24 PROCEDURE — 99232 SBSQ HOSP IP/OBS MODERATE 35: CPT | Performed by: INTERNAL MEDICINE

## 2019-09-24 PROCEDURE — 84703 CHORIONIC GONADOTROPIN ASSAY: CPT | Performed by: RADIOLOGY

## 2019-09-24 PROCEDURE — 85027 COMPLETE CBC AUTOMATED: CPT | Performed by: NURSE PRACTITIONER

## 2019-09-24 PROCEDURE — 80053 COMPREHEN METABOLIC PANEL: CPT | Performed by: NURSE PRACTITIONER

## 2019-09-24 PROCEDURE — 0CJS8ZZ INSPECTION OF LARYNX, VIA NATURAL OR ARTIFICIAL OPENING ENDOSCOPIC: ICD-10-PCS | Performed by: OTOLARYNGOLOGY

## 2019-09-24 PROCEDURE — 83550 IRON BINDING TEST: CPT | Performed by: INTERNAL MEDICINE

## 2019-09-24 PROCEDURE — 99357 PR PROLONGED SERV,INPATIENT,EA ADD 30 MIN: CPT | Performed by: PHYSICIAN ASSISTANT

## 2019-09-24 PROCEDURE — 82306 VITAMIN D 25 HYDROXY: CPT | Performed by: INTERNAL MEDICINE

## 2019-09-24 PROCEDURE — 86160 COMPLEMENT ANTIGEN: CPT | Performed by: INTERNAL MEDICINE

## 2019-09-24 RX ORDER — TORSEMIDE 10 MG/1
5 TABLET ORAL DAILY
Status: DISCONTINUED | OUTPATIENT
Start: 2019-09-25 | End: 2019-09-26 | Stop reason: HOSPADM

## 2019-09-24 RX ADMIN — ATORVASTATIN CALCIUM 20 MG: 20 TABLET, FILM COATED ORAL at 17:02

## 2019-09-24 RX ADMIN — TORSEMIDE 10 MG: 20 TABLET ORAL at 09:41

## 2019-09-24 RX ADMIN — ENOXAPARIN SODIUM 40 MG: 40 INJECTION SUBCUTANEOUS at 10:14

## 2019-09-24 NOTE — PROGRESS NOTES
[address] [facility] NOTE   Asha Willett 45 y o  female MRN: [id number]  Unit/Bed#: -01 Encounter: [id number]  Reason for Consult: bilateral lower extremity edema in the setting of proteinuria and hypoalbuminemia    ASSESSMENT and PLAN:    1  Nephrotic syndrome:   Patient with bilateral lower edema + proteinuria + hypoalbuminemia + high cholesterol which all support nephrotic syndrome diagnosis     DDX: membranous nephropathy vs focal segmental glomerulonephritis vs minimal changes disease    Work up and plan:   · Continue to monitor I/O with fluid restriction and Na restriction   · Continue torsemide 10 mg daily  Close monitor of blood pressure, patient's blood pressure is currently stable 100s/60s   · Hepatitis panel was normal  · C3 of 127 C4 of 30  ZAYNAB: still pending  · Free light chain: pending   · Serum and urine electrophoresis revealed no monoclonal bands  · US of kidney and bladder showed: No hydronephrosis or shadowing calculi  Trace right perinephric fluid  Unremarkable bladder  · Renal biopsy scheduled for tomorrow with IR    2  Hyperlipidemia: secondary to nephrotic syndrome  · Continue atorvastatin    3  Uvular edema   Patient denies shortness of breath    · C1 esterase pending   · ENT following up       DISPOSITION:  Will continue to follow up     SUBJECTIVE / INTERVAL HISTORY:  Patient was seen and examined today, she is alert and oriented, denied shortness of breath, chest pain or other complaints       OBJECTIVE:  Current Weight: Weight - Scale: 61 4 kg (135 lb 6 oz)  Vitals:    09/23/19 1452 09/23/19 2225 09/24/19 0600 09/24/19 0744   BP: 111/67 95/54  106/60   BP Location: Left arm Left arm  Left arm   Pulse: 86 87  97   Resp: 18 18  18   Temp: 97 9 °F (36 6 °C) 97 7 °F (36 5 °C)  98 °F (36 7 °C)   TempSrc: Oral Oral  Oral   SpO2: 97% 96%  98%   Weight:   61 4 kg (135 lb 6 oz)    Height:           Intake/Output Summary (Last 24 hours) at 9/24/2019 1323  Last data filed at 9/24/2019 1237  Gross per 24 hour   Intake 720 ml   Output 2400 ml   Net -1680 ml     Physical Exam   Constitutional: She is oriented to person, place, and time  She appears well-developed  No distress  HENT:   Head: Normocephalic  Mouth/Throat: Oropharynx is clear and moist    Neck: Neck supple  No JVD present  Cardiovascular: Normal rate and regular rhythm  No murmur heard  Pulmonary/Chest: Effort normal and breath sounds normal  She has no wheezes  She has no rales  Abdominal: Soft  Bowel sounds are normal  She exhibits no distension  There is no tenderness  Musculoskeletal: She exhibits edema  Bilateral lower leg edema more prominent on the left side    Neurological: She is alert and oriented to person, place, and time  Skin: Skin is warm and dry  Capillary refill takes less than 2 seconds  She is not diaphoretic  No erythema  No pallor  Psychiatric: She has a normal mood and affect  Her behavior is normal    Nursing note and vitals reviewed        Current Facility-Administered Medications:     acetaminophen (TYLENOL) tablet 650 mg, 650 mg, Oral, Q6H PRN, Amirah Rodas MD    atorvastatin (LIPITOR) tablet 20 mg, 20 mg, Oral, Daily With Karen Zambrano MD, 20 mg at 09/23/19 1650    enoxaparin (LOVENOX) subcutaneous injection 40 mg, 40 mg, Subcutaneous, Q24H [address], Amirah Rodas MD, 40 mg at 09/24/19 1014    nicotine (NICODERM CQ) 7 mg/24hr TD 24 hr patch 1 patch, 1 patch, Transdermal, Daily, Amirah Rodas MD    ondansetron [facility]) injection 4 mg, 4 mg, Intravenous, Q6H PRN, Amirah Rodas MD    torsemide [facility]) tablet 10 mg, 10 mg, Oral, Daily, Pricilla Crowell MD, 10 mg at 09/24/19 0941    Laboratory Results:  Results from last 7 days   Lab Units 09/24/19  0605 09/23/19  0458 09/22/19  1059   WBC Thousand/uL 7 90 10 33* 9 52   HEMOGLOBIN g/dL 12 1 12 5 13 3   HEMATOCRIT % 37 1 37 9 41 1   PLATELETS Thousands/uL 292 328 328   POTASSIUM mmol/L 4 1 3 8 3 9   CHLORIDE mmol/L 110* 111* 110*   CO2 mmol/L 26 27 30   BUN mg/dL 15 14 9   CREATININE mg/dL 0 60 0 54* 0 51*   CALCIUM mg/dL 7 5* 7 5* 7 2*

## 2019-09-24 NOTE — PROGRESS NOTES
Progress Note - Neisha De Souza 1975, 45 y o  female MRN: [id number]    Unit/Bed#: -01 Encounter: [id number]    Primary Care Provider: No primary care provider on file  Date and time admitted to hospital: 9/22/2019  9:51 AM      * Bilateral edema of lower extremity  Assessment & Plan  · Patient is a 45year old female, previously healthy, who developed gastroenteritis almost 4 weeks ago with nausea, vomiting and diarrhea  She then developed bilateral lower extremity edema which got progressively worse, especially after standing for several hours  This, then, progressed to anasarca  Patient states she has gained over 20 lbs in less than 3 weeks  Patient went to see her PCP who ordered lab tests that showed hypoalbumineamia (0 9 g/dl), Proteinuria (>300 mg/dl)  Lipid panel showed hyperlipidemia  · Proteinuria, edema, and hypoalbuminemia, with hyperlipidemia points towards nephrotic syndrome    Plan:  · Nephrology on board  · US of kidney and bladder showed: No hydronephrosis or shadowing calculi  Trace right perinephric fluid  Unremarkable bladder  · Labs ordered:  · Hepatitis panel non-reactive  · C3 level of 127  · C4 level of 30  · Protein electrophoresis urine and blood showed no monoclonal bands  · Awaiting ZAYNAB and free light chains results  · Referred to IR Dr Paula Valdez for renal biopsy tomorrow  IR recommending airway evaluation by ENT for possible sedation/anesthesia  [place] kimberly Walton PA-C for assessment of patient, currently awaiting response  · Started on Torsemide 10mg daily   · Monitor I/O  · Consult Nutrition   · Diet: High protein and High carbohydrates, Na < 2 gr/day, fluid restriction <1500   Will shift to NPO after midnight  · Started on Atorvastatin 20mg daily  · Continue lovenox for today, will hold AC tonight  · Monitor Weight  · Monitor CBC and CMP    Hypoalbuminemia  Assessment & Plan  Albumin on admission is 0 9 g/dl  Current albumin today 0 8 (see above for more details)  Plan:  · Nephrology on board  See plans above    Proteinuria  Assessment & Plan  Urine macroscopic study on admission shows Urine Protein >300 mg/dl  This, in combination with patient's other symptoms and lab results, is suggestive of nephrotic syndrome  (see plan above for more details)    Uvular edema  Assessment & Plan  Patient presented with history of uvular edema  Was given one dose of IV Decadron at the ED  Patient responded well  · Seen by ENT yesterday, no further intervention from their stand point  Will await further tests requested such as C1 esterase result  · Reached out to ENT today for evaluation of airway for biopsy procedure tomorrow as per IR recommendation  Awaiting response  VTE Pharmacologic Prophylaxis:   Pharmacologic: Enoxaparin (Lovenox)  Mechanical VTE Prophylaxis in Place: Yes    Discussions with Specialists or Other Care Team Provider: Discussed with nurse, attending, Nephro    Education and Discussions with Family / Patient: Discussed with patient    Current Length of Stay: 0 day(s)    Current Patient Status: Inpatient     Discharge Plan / Estimated Discharge Date: TBD, patient for biopsy tomorrow    Code Status: Level 1 - Full Code      Subjective:   Patient seen and examined this morning  Patient noted blood in the toilet and some pinkish spotting/stain upon wiping when she was trying to go to the bathroom this morning  Patient was concerned that it was probably because of the anticoagulation medication that she was getting  Patient noted improvement of facial swelling and is not complaining of dyspnea or difficulty swallowing  Patient still with lower extremity edema that she reports usually worsens at the end of the day  Patient not complaining of any chest pain, no abdominal pain, no dizziness, no lightheadedness      Objective:     Vitals:   Temp (24hrs), Av 9 °F (36 6 °C), Min:97 7 °F (36 5 °C), Max:98 °F (36 7 °C)    Temp:  [97 7 °F (36 5 °C)-98 °F (36 7 °C)] 98 °F (36 7 °C)  HR:  [86-97] 97  Resp:  [18] 18  BP: ()/(54-67) 106/60  SpO2:  [96 %-98 %] 98 %  Body mass index is 23 24 kg/m²  Input and Output Summary (last 24 hours): Intake/Output Summary (Last 24 hours) at 9/24/2019 1315  Last data filed at 9/24/2019 1237  Gross per 24 hour   Intake 720 ml   Output 2400 ml   Net -1680 ml       Physical Exam:     Physical Exam   Constitutional: She is oriented to person, place, and time  She appears well-developed and well-nourished  No distress  HENT:   Head: Normocephalic and atraumatic  Mouth/Throat: Oropharynx is clear and moist  No oropharyngeal exudate  Eyes: Pupils are equal, round, and reactive to light  Conjunctivae and EOM are normal  No scleral icterus  Neck: Normal range of motion  Neck supple  No JVD present  Cardiovascular: Normal rate, regular rhythm, normal heart sounds and intact distal pulses  Pulmonary/Chest: Effort normal and breath sounds normal  No respiratory distress  Abdominal: Soft  Bowel sounds are normal  She exhibits no distension  There is no tenderness  There is no guarding  Musculoskeletal: Normal range of motion  She exhibits edema (bilateral LE)  She exhibits no tenderness or deformity  Lymphadenopathy:     She has no cervical adenopathy  Neurological: She is alert and oriented to person, place, and time  No cranial nerve deficit or sensory deficit  She exhibits normal muscle tone  Skin: Skin is warm  She is not diaphoretic  No erythema  No pallor  Psychiatric: She has a normal mood and affect  Her behavior is normal  Thought content normal    Nursing note and vitals reviewed            Additional Data:     Labs:    Results from last 7 days   Lab Units 09/24/19  0605 09/23/19  0458   WBC Thousand/uL 7 90 10 33*   HEMOGLOBIN g/dL 12 1 12 5   HEMATOCRIT % 37 1 37 9   PLATELETS Thousands/uL 292 328   NEUTROS PCT %  --  66   LYMPHS PCT %  --  20   MONOS PCT %  --  10   EOS PCT %  --  2     Results from last 7 days   Lab Units 09/24/19  0605   POTASSIUM mmol/L 4 1   CHLORIDE mmol/L 110*   CO2 mmol/L 26   BUN mg/dL 15   CREATININE mg/dL 0 60   CALCIUM mg/dL 7 5*   ALK PHOS U/L 45*   ALT U/L 29   AST U/L 30     Results from last 7 days   Lab Units 09/23/19  0903   INR  1 01       * I Have Reviewed All Lab Data Listed Above  * Additional Pertinent Lab Tests Reviewed: [place] [address] Admission Reviewed    Imaging:    Imaging Reports Reviewed Today Include: US of kidney and bladder  Imaging Personally Reviewed by Myself Includes:  None    Recent Cultures (last 7 days):           Last 24 Hours Medication List:     Current Facility-Administered Medications:  acetaminophen 650 mg Oral Q6H PRN Betty Paredes MD   atorvastatin 20 mg Oral Daily With Zoie Gilman MD   enoxaparin 40 mg Subcutaneous Q24H [address] Betty Paredes MD   nicotine 1 patch Transdermal Daily Betty Paredes MD   ondansetron 4 mg Intravenous Q6H PRN Betty Paredes MD   torsemide 10 mg Oral Daily Esme Barlow MD        Today, Patient Was Seen By: Leta Chaney MD    ** Please Note: This note has been constructed using a voice recognition system   **

## 2019-09-24 NOTE — ASSESSMENT & PLAN NOTE
Albumin on admission is 0 9 g/dl  Current albumin today 0 8 (see above for more details)  Plan:  · Nephrology on board   See plans above

## 2019-09-24 NOTE — ASSESSMENT & PLAN NOTE
· Patient is a 45year old female, previously healthy, who developed gastroenteritis almost 4 weeks ago with nausea, vomiting and diarrhea  She then developed bilateral lower extremity edema which got progressively worse, especially after standing for several hours  This, then, progressed to anasarca  Patient states she has gained over 20 lbs in less than 3 weeks  Patient went to see her PCP who ordered lab tests that showed hypoalbumineamia (0 9 g/dl), Proteinuria (>300 mg/dl)  Lipid panel showed hyperlipidemia  · Proteinuria, edema, and hypoalbuminemia, with hyperlipidemia points towards nephrotic syndrome  ·   Plan:  · Nephrology on board  · US of kidney and bladder showed: No hydronephrosis or shadowing calculi  Trace right perinephric fluid  Unremarkable bladder  · Labs ordered:  · Hepatitis panel non-reactive  · C3 level of 127  · C4 level of 30  · Protein electrophoresis urine and blood showed no monoclonal bands  · Awaiting ZAYNAB and free light chains results  · Referred to IR Dr Idris Howard for renal biopsy tomorrow  IR recommending airway evaluation by ENT for possible sedation/anesthesia  [place] texted Anthony Navarro PA-C for assessment of patient, currently awaiting response  · Started on Torsemide 10mg daily   · Monitor I/O  · Consult Nutrition   · Diet: High protein and High carbohydrates, Na < 2 gr/day, fluid restriction <1500   Will shift to NPO after midnight  · Started on Atorvastatin 20mg daily  · Continue lovenox for today, will hold AC tonight  · Monitor Weight  · Monitor CBC and CMP

## 2019-09-24 NOTE — UTILIZATION REVIEW
Continued Stay Review    9/22/2019  1359 OBSERVATION and CHANGED 9/24/2019 1000 INPATIENT RE:  Patient with nephrotic syndrome requiring ongoing work up and evaluation  Start   Ordered   09/24/19 1000  Inpatient Admission Once     Transfer Service: General Medicine       Question Answer Comment   Admitting Physician [facility], [place]    Level of Care Med Surg    Estimated length of stay More than 2 Midnights    Certification I certify that inpatient services are medically necessary for this patient for a duration of greater than two midnights  See H&P and MD Progress Notes for additional information about the patient's course of treatment  09/24/19 1000       Date: 9/24/2019                         Current Patient Class: observation  Current Level of Care: med surg    HPI:44 y o  female initially admitted on 9/22/2019 to observation due to Bilateral lower extremity edema/proteinuria/hypoalbuminemia likely nephrotic syndrome  Presented with increased edema and anasarca especially after a days work starting about 4 weeks ago after a viral illness  Has gained 20 lbs in  About 3 weeks  Outpatient labs showed hypoalbuminemia at 0 9g/dl and proteinuria of >300 mg/dl  On arrival with swelling about eyes and neck, treated with IV decadron in ED  ENT and nephrology consulted     Per ENT 9/23/2019-   Uvular swelling is resolved  Doubt angioedema  No ENT intervention  Per nephology 9/23/2019 -  Has nephrotic syndrome  Differential diagnosis of membranous nephropathy, minimal change disease, focal segmental glomerulosclerosis and amyloidosis  Renal biopsy needed  Start torsemide for bilateral lower extremity edema    Assessment/Plan: today 9/24/2019 CHANGED TO INPATIENT:  Patient with persistent lower extremity swelling  Noted blood in toilet and pinkish staining with wiping  On anticoagulation medication  On exam bilateral edema  Renal biopsy to be done 9/25   IR recommending airway evaluation by ENT for possible sedation/anesthesia  Nephrotic syndrome suspected  High protein and High carbohydrates, Na < 2 gr/day, fluid restriction <1500 in progress  Current albumin 0 8  Pertinent Labs/Diagnostic Results:   9/23/2019 ECHO- LEFT VENTRICLE: Size was normal  Systolic function was normal  Ejection fraction was estimated to be 55 %  There were no regional wall motion abnormalities  Wall thickness was normal  DOPPLER: Left ventricular diastolic function parameterswere normal    RIGHT VENTRICLE: The size was normal  Systolic function was normal  Wall thickness was normal    LEFT ATRIUM: Size was normal    RIGHT ATRIUM: Size was normal    MITRAL VALVE: Valve structure was normal  There was normal leaflet separation  DOPPLER: The transmitral velocity was within the normal range  There was no evidence for stenosis  There was no regurgitation   AORTIC VALVE: The valve was trileaflet  Leaflets exhibited normal thickness and normal cuspal separation  DOPPLER: Transaortic velocity was within the normal range  There was no evidence for stenosis  There was no regurgitation    TRICUSPID VALVE: The valve structure was normal  There was normal leaflet separation  DOPPLER: The transtricuspid velocity was within the normal range  There was no evidence for stenosis  There was trace regurgitation  The tricuspid jetenvelope definition was inadequate for estimation of RV systolic pressure  There are no indirect findings (abnormal RV volume or geometry, altered pulmonary flow velocity profile, or leftward septal displacement) which would suggestmoderate or severe pulmonary hypertension    PULMONIC VALVE: Leaflets exhibited normal thickness, no calcification, and normal cuspal separation  DOPPLER: The transpulmonic velocity was within the normal range  There was mild regurgitation   PERICARDIUM: There was no pericardial effusion  The pericardium was normal in appearance     AORTA: The root exhibited normal size    SYSTEMIC VEINS: IVC: The inferior vena cava was normal in size and course  Respirophasic changes were normal     9/24/2019 US kidney and bladder   No hydronephrosis or shadowing calculi   Trace right perinephric fluid    Unremarkable bladder     Results from last 7 days   Lab Units 09/24/19  0605 09/23/19  0458 09/22/19  1059   WBC Thousand/uL 7 90 10 33* 9 52   HEMOGLOBIN g/dL 12 1 12 5 13 3   HEMATOCRIT % 37 1 37 9 41 1   PLATELETS Thousands/uL 292 328 328   NEUTROS ABS Thousands/µL  --  6 94 6 48     Results from last 7 days   Lab Units 09/24/19  0605 09/23/19  0458 09/22/19  1059   SODIUM mmol/L 140 141 143   POTASSIUM mmol/L 4 1 3 8 3 9   CHLORIDE mmol/L 110* 111* 110*   CO2 mmol/L 26 27 30   ANION GAP mmol/L 4 3* 3*   BUN mg/dL 15 14 9   CREATININE mg/dL 0 60 0 54* 0 51*   EGFR ml/min/1 73sq m 113 117 119   CALCIUM mg/dL 7 5* 7 5* 7 2*     Results from last 7 days   Lab Units 09/24/19  0605 09/23/19  0458 09/22/19  1059   AST U/L 30 25 37   ALT U/L 29 27 34   ALK PHOS U/L 45* 48 55   TOTAL PROTEIN g/dL 4 2* 4 2* 4 7*   ALBUMIN g/dL 0 8* 0 7* 0 9*   TOTAL BILIRUBIN mg/dL 0 30 0 30 0 40         Results from last 7 days   Lab Units 09/24/19  0605 09/23/19  0458 09/22/19  1059   GLUCOSE RANDOM mg/dL 88 92 85     Results from last 7 days   Lab Units 09/23/19  0903   PROTIME seconds 12 7   INR  1 01   PTT seconds 29     Results from last 7 days   Lab Units 09/22/19  1059   NT-PRO BNP pg/mL 214*     Results from last 7 days   Lab Units 09/23/19  0902   HEP B S AG  Non-reactive   HEP C AB  Non-reactive   HEP B C IGM  Non-reactive   HEP B C TOTAL AB  Non-reactive     Results from last 7 days   Lab Units 09/22/19  1212   CLARITY UA  Clear   COLOR UA  Yellow   SPEC GRAV UA  1 025   PH UA  8 5*   GLUCOSE UA mg/dl Negative   KETONES UA mg/dl Negative   BLOOD UA  Negative   PROTEIN UA mg/dl >=300*   NITRITE UA  Negative   BILIRUBIN UA  Negative   UROBILINOGEN UA E U /dl 0 2   LEUKOCYTES UA  Negative   WBC UA /hpf None Seen   RBC UA /hpf None Seen   BACTERIA UA /hpf Occasional   EPITHELIAL CELLS WET PREP /hpf Occasional   MUCUS THREADS  Occasional*     Vital Signs:   09/24/19 0744  98 °F (36 7 °C)  97  18  106/60  76  98 %  None (Room air)  Sitting   09/23/19 2225  97 7 °F (36 5 °C)  87  18  95/54  --  96 %  None (Room air)  Lying   09/23/19 1452  97 9 °F (36 6 °C)  86  18  111/67  --  97 %  None (Room air)  Lying   09/23/19 0751  97 9 °F (36 6 °C)  96  18  104/65  79  97 %  None (Room air)  Sitting   09/22/19 2233  97 9 °F (36 6 °C)  85  18  106/63  77  95 %  None (Room air)  Lying   09/22/19 1519  98 2 °F (36 8 °C)  100  18  108/71  --  97 %  None (Room air)  Sitting   09/22/19 1334  --  94  16  116/75  --  97 %  None (Room air)  Sitting   09/22/19 1202  --  92  16  112/73  --  100 %  None (Room air)  Lying   09/22/19 0956  98 4 °F (36 9 °C)  112Abnormal   16  145/70  --  98 %  None (Room air       09/24/19 0600  61 4 kg (135 lb 6 oz)  Standing scale  --   09/23/19 0600  61 9 kg (136 lb 6 4 oz)  Standing scale  5' 4" (1 626 m)   09/22/19 1519  --  --  5' 4" (1 626 m)   09/22/19 0956  60 9 kg (134 lb 4 2 oz)  --  --      09/22 0701  09/23 0700 09/23 0701  09/24 0700 09/24 0701  09/25 0700   P  O  120 960    Total Intake(mL/kg) 120 (1 9) 960 (15 6)    Urine (mL/kg/hr)  400 (0 3) 600 (5 2)   Total Output  400 600   Net +120 +560 -600         Unmeasured Urine Occurrence 1 x         Medications:   Scheduled Meds:   Current Facility-Administered Medications:  acetaminophen 650 mg Oral Q6H PRN   atorvastatin 20 mg Oral Daily With Dinner   enoxaparin 40 mg Subcutaneous Q24H [address]   nicotine 1 patch Transdermal Daily   ondansetron 4 mg Intravenous Q6H PRN   torsemide 10 mg Oral Daily   No prn medication used     Discharge Plan: to be determined  Network Utilization Review Department  Phone: [phone]; Fax [phone]  [email or web address]  ATTENTION: Please call with any questions or concerns to [phone]  and carefully listen to the prompts so that you are directed to the right person  Send all requests for admission clinical reviews, approved or denied determinations and any other requests to fax [phone]   All voicemails are confidential

## 2019-09-24 NOTE — PLAN OF CARE
Problem: PAIN - ADULT  Goal: Verbalizes/displays adequate comfort level or baseline comfort level  Description  Interventions:  - Encourage patient to monitor pain and request assistance  - Assess pain using appropriate pain scale  - Administer analgesics based on type and severity of pain and evaluate response  - Implement non-pharmacological measures as appropriate and evaluate response  - Consider cultural and social influences on pain and pain management  - Notify physician/advanced practitioner if interventions unsuccessful or patient reports new pain  Outcome: Progressing     Problem: INFECTION - ADULT  Goal: Absence or prevention of progression during hospitalization  Description  INTERVENTIONS:  - Assess and monitor for signs and symptoms of infection  - Monitor lab/diagnostic results  - Monitor all insertion sites, i e  indwelling lines, tubes, and drains  - Monitor endotracheal if appropriate and nasal secretions for changes in amount and color  - [place] appropriate cooling/warming therapies per order  - Administer medications as ordered  - Instruct and encourage patient and family to use good hand hygiene technique  - Identify and instruct in appropriate isolation precautions for identified infection/condition  Outcome: Progressing     Problem: SAFETY ADULT  Goal: Patient will remain free of falls  Description  INTERVENTIONS:  - Assess patient frequently for physical needs  -  Identify cognitive and physical deficits and behaviors that affect risk of falls    -  [place] fall precautions as indicated by assessment   - Educate patient/family on patient safety including physical limitations  - Instruct patient to call for assistance with activity based on assessment  - Modify environment to reduce risk of injury  - Consider OT/PT consult to assist with strengthening/mobility  Outcome: Progressing  Goal: Maintain or return to baseline ADL function  Description  INTERVENTIONS:  -  Assess patient's ability to carry out ADLs; assess patient's baseline for ADL function and identify physical deficits which impact ability to perform ADLs (bathing, care of mouth/teeth, toileting, grooming, dressing, etc )  - Assess/evaluate cause of self-care deficits   - Assess range of motion  - Assess patient's mobility; develop plan if impaired  - Assess patient's need for assistive devices and provide as appropriate  - Encourage maximum independence but intervene and supervise when necessary  - Involve family in performance of ADLs  - Assess for home care needs following discharge   - Consider OT consult to assist with ADL evaluation and planning for discharge  - Provide patient education as appropriate  Outcome: Progressing  Goal: Maintain or return mobility status to optimal level  Description  INTERVENTIONS:  - Assess patient's baseline mobility status (ambulation, transfers, stairs, etc )    - Identify cognitive and physical deficits and behaviors that affect mobility  - Identify mobility aids required to assist with transfers and/or ambulation (gait belt, sit-to-stand, lift, walker, cane, etc )  - [place] fall precautions as indicated by assessment  - Record patient progress and toleration of activity level on Mobility SBAR; progress patient to next Phase/Stage  - Instruct patient to call for assistance with activity based on assessment  - Consider rehabilitation consult to assist with strengthening/weightbearing, etc   Outcome: Progressing     Problem: DISCHARGE PLANNING  Goal: Discharge to home or other facility with appropriate resources  Description  INTERVENTIONS:  - Identify barriers to discharge w/patient and caregiver  - Arrange for needed discharge resources and transportation as appropriate  - Identify discharge learning needs (meds, wound care, etc )  - Arrange for interpretive services to assist at discharge as needed  - Refer to Case Management Department for coordinating discharge planning if the patient needs post-hospital services based on physician/advanced practitioner order or complex needs related to functional status, cognitive ability, or social support system  Outcome: Progressing     Problem: Knowledge Deficit  Goal: Patient/family/caregiver demonstrates understanding of disease process, treatment plan, medications, and discharge instructions  Description  Complete learning assessment and assess knowledge base  Interventions:  - Provide teaching at level of understanding  - Provide teaching via preferred learning methods  Outcome: Progressing     Problem: Nutrition/Hydration-ADULT  Goal: Nutrient/Hydration intake appropriate for improving, restoring or maintaining nutritional needs  Description  Monitor and assess patient's nutrition/hydration status for malnutrition  Collaborate with interdisciplinary team and initiate plan and interventions as ordered  Monitor patient's weight and dietary intake as ordered or per policy  Utilize nutrition screening tool and intervene as necessary  Determine patient's food preferences and provide high-protein, high-caloric foods as appropriate       INTERVENTIONS:  - Monitor oral intake, urinary output, labs, and treatment plans  - Assess nutrition and hydration status and recommend course of action  - Evaluate amount of meals eaten  - Assist patient with eating if necessary   - Allow adequate time for meals  - Recommend/ encourage appropriate diets, oral nutritional supplements, and vitamin/mineral supplements  - Order, calculate, and assess calorie counts as needed  - Recommend, monitor, and adjust tube feedings and TPN/PPN based on assessed needs  - Assess need for intravenous fluids  - Provide specific nutrition/hydration education as appropriate  - Include patient/family/caregiver in decisions related to nutrition  Outcome: Progressing     Problem: METABOLIC, FLUID AND ELECTROLYTES - ADULT  Goal: Electrolytes maintained within normal limits  Description  INTERVENTIONS:  - Monitor labs and assess patient for signs and symptoms of electrolyte imbalances  - Administer electrolyte replacement as ordered  - Monitor response to electrolyte replacements, including repeat lab results as appropriate  - Instruct patient on fluid and nutrition as appropriate  Outcome: Progressing

## 2019-09-24 NOTE — PROGRESS NOTES
S: Asked to perform fiberoptic laryngoscopy to assess patient's upper airway, for IR renal biopsy under sedation  The patient was seen in consultation yesterday, at which time ENT exam was unremarkable, therefore fiberoptic exam had been deferred  She presently denies cough, shortness of breath, wheezing, difficulty or pain on swallowing, globus sensation, excessive drooling, or change in voice  O: The patient is alert, smiling, sitting in bed, on room air, in NAD  Nares patent, mild Rightward nasal septal deviation  No cough, stridor, or wheezing  Oropharyngeal exam remains unremarkable; the patient has a somewhat redundant soft palate, no erythema, NO uvular edema at this time, tonsils 1+ bilaterally without exudates  FFL:  The patient was placed in an upright seated position in bed  Verbal consent was obtained  A 50/50 blend of lidocaine 4% plain and oxymetazoline was introduced into each nasal cavity via pipette  A 0-degree flexible fiberoptic nasolaryngoscope was then passed into the Right then left nasal cavity respectively  Symmetrically prominent lingual tonsils are noted, abutting the anterior (lingual) surface of the epiglottis  The epiglottis is grossly normal   No pooling of secretions  No apparent supraglottic or glottic edema  No erythema  TVC's are mobile and symmetric  No discernable lesions or masses  Post-cricoid region is unremarkable  A: No upper airway obstruction based on fiberoptic exam at this time  Overall unremarkable ENT exam   Incidental findings of mild Rightward nasal septal deviation, anatomically redundant soft palate, and mild to moderate lingual tonsillar hypertrophy  P: Discussed with Dr Wilda Glass  Cleared from ENT perspective for sedation and/or anesthesia  As per Medicine and Nephrology      Mar Silvestre PA-C

## 2019-09-24 NOTE — ASSESSMENT & PLAN NOTE
Patient presented with history of uvular edema  Was given one dose of IV Decadron at the ED  Patient responded well  · Seen by ENT yesterday, no further intervention from their stand point  Will await further tests requested such as C1 esterase result  · Reached out to ENT today for evaluation of airway for biopsy procedure tomorrow as per IR recommendation  Awaiting response

## 2019-09-24 NOTE — PLAN OF CARE
Problem: PAIN - ADULT  Goal: Verbalizes/displays adequate comfort level or baseline comfort level  Description  Interventions:  - Encourage patient to monitor pain and request assistance  - Assess pain using appropriate pain scale  - Administer analgesics based on type and severity of pain and evaluate response  - Implement non-pharmacological measures as appropriate and evaluate response  - Consider cultural and social influences on pain and pain management  - Notify physician/advanced practitioner if interventions unsuccessful or patient reports new pain  Outcome: Progressing     Problem: INFECTION - ADULT  Goal: Absence or prevention of progression during hospitalization  Description  INTERVENTIONS:  - Assess and monitor for signs and symptoms of infection  - Monitor lab/diagnostic results  - Monitor all insertion sites, i e  indwelling lines, tubes, and drains  - Monitor endotracheal if appropriate and nasal secretions for changes in amount and color  - [place] appropriate cooling/warming therapies per order  - Administer medications as ordered  - Instruct and encourage patient and family to use good hand hygiene technique  - Identify and instruct in appropriate isolation precautions for identified infection/condition  Outcome: Progressing  Goal: Absence of fever/infection during neutropenic period  Description  INTERVENTIONS:  - Monitor WBC    Outcome: Progressing     Problem: SAFETY ADULT  Goal: Patient will remain free of falls  Description  INTERVENTIONS:  - Assess patient frequently for physical needs  -  Identify cognitive and physical deficits and behaviors that affect risk of falls    -  [place] fall precautions as indicated by assessment   - Educate patient/family on patient safety including physical limitations  - Instruct patient to call for assistance with activity based on assessment  - Modify environment to reduce risk of injury  - Consider OT/PT consult to assist with strengthening/mobility  Outcome: Progressing

## 2019-09-25 ENCOUNTER — APPOINTMENT (INPATIENT)
Dept: RADIOLOGY | Facility: HOSPITAL | Age: 44
DRG: 700 | End: 2019-09-25
Attending: INTERNAL MEDICINE
Payer: COMMERCIAL

## 2019-09-25 LAB
ANION GAP SERPL CALCULATED.3IONS-SCNC: 2 MMOL/L (ref 4–13)
BASOPHILS # BLD AUTO: 0.08 THOUSANDS/ΜL (ref 0–0.1)
BASOPHILS NFR BLD AUTO: 1 % (ref 0–1)
BUN SERPL-MCNC: 14 MG/DL (ref 5–25)
C-ANCA TITR SER IF: NORMAL TITER
CALCIUM SERPL-MCNC: 7.7 MG/DL (ref 8.3–10.1)
CHLORIDE SERPL-SCNC: 110 MMOL/L (ref 100–108)
CO2 SERPL-SCNC: 29 MMOL/L (ref 21–32)
CREAT SERPL-MCNC: 0.61 MG/DL (ref 0.6–1.3)
EOSINOPHIL # BLD AUTO: 0.65 THOUSAND/ΜL (ref 0–0.61)
EOSINOPHIL NFR BLD AUTO: 10 % (ref 0–6)
ERYTHROCYTE [DISTWIDTH] IN BLOOD BY AUTOMATED COUNT: 12.5 % (ref 11.6–15.1)
GFR SERPL CREATININE-BSD FRML MDRD: 112 ML/MIN/1.73SQ M
GLUCOSE SERPL-MCNC: 90 MG/DL (ref 65–140)
HCT VFR BLD AUTO: 38.5 % (ref 34.8–46.1)
HGB BLD-MCNC: 12.6 G/DL (ref 11.5–15.4)
IMM GRANULOCYTES # BLD AUTO: 0.02 THOUSAND/UL (ref 0–0.2)
IMM GRANULOCYTES NFR BLD AUTO: 0 % (ref 0–2)
LYMPHOCYTES # BLD AUTO: 2.99 THOUSANDS/ΜL (ref 0.6–4.47)
LYMPHOCYTES NFR BLD AUTO: 44 % (ref 14–44)
MAGNESIUM SERPL-MCNC: 1.9 MG/DL (ref 1.6–2.6)
MCH RBC QN AUTO: 29.9 PG (ref 26.8–34.3)
MCHC RBC AUTO-ENTMCNC: 32.7 G/DL (ref 31.4–37.4)
MCV RBC AUTO: 91 FL (ref 82–98)
MONOCYTES # BLD AUTO: 0.6 THOUSAND/ΜL (ref 0.17–1.22)
MONOCYTES NFR BLD AUTO: 9 % (ref 4–12)
MYELOPEROXIDASE AB SER IA-ACNC: <9 U/ML (ref 0–9)
NEUTROPHILS # BLD AUTO: 2.43 THOUSANDS/ΜL (ref 1.85–7.62)
NEUTS SEG NFR BLD AUTO: 36 % (ref 43–75)
NRBC BLD AUTO-RTO: 0 /100 WBCS
P-ANCA ATYPICAL TITR SER IF: NORMAL TITER
P-ANCA TITR SER IF: NORMAL TITER
PLATELET # BLD AUTO: 291 THOUSANDS/UL (ref 149–390)
PMV BLD AUTO: 8.6 FL (ref 8.9–12.7)
POTASSIUM SERPL-SCNC: 3.8 MMOL/L (ref 3.5–5.3)
PROTEINASE3 AB SER IA-ACNC: <3.5 U/ML (ref 0–3.5)
RBC # BLD AUTO: 4.22 MILLION/UL (ref 3.81–5.12)
RYE IGE QN: NEGATIVE
SODIUM SERPL-SCNC: 141 MMOL/L (ref 136–145)
WBC # BLD AUTO: 6.77 THOUSAND/UL (ref 4.31–10.16)

## 2019-09-25 PROCEDURE — 88300 SURGICAL PATH GROSS: CPT | Performed by: PATHOLOGY

## 2019-09-25 PROCEDURE — 80048 BASIC METABOLIC PNL TOTAL CA: CPT | Performed by: INTERNAL MEDICINE

## 2019-09-25 PROCEDURE — 88305 TISSUE EXAM BY PATHOLOGIST: CPT | Performed by: INTERNAL MEDICINE

## 2019-09-25 PROCEDURE — 88346 IMFLUOR 1ST 1ANTB STAIN PX: CPT | Performed by: INTERNAL MEDICINE

## 2019-09-25 PROCEDURE — 88313 SPECIAL STAINS GROUP 2: CPT | Performed by: INTERNAL MEDICINE

## 2019-09-25 PROCEDURE — 99232 SBSQ HOSP IP/OBS MODERATE 35: CPT | Performed by: INTERNAL MEDICINE

## 2019-09-25 PROCEDURE — 88348 ELECTRON MICROSCOPY DX: CPT | Performed by: INTERNAL MEDICINE

## 2019-09-25 PROCEDURE — 99153 MOD SED SAME PHYS/QHP EA: CPT

## 2019-09-25 PROCEDURE — 99152 MOD SED SAME PHYS/QHP 5/>YRS: CPT | Performed by: STUDENT IN AN ORGANIZED HEALTH CARE EDUCATION/TRAINING PROGRAM

## 2019-09-25 PROCEDURE — NC001 PR NO CHARGE: Performed by: STUDENT IN AN ORGANIZED HEALTH CARE EDUCATION/TRAINING PROGRAM

## 2019-09-25 PROCEDURE — 88329 PATH CONSLTJ DRG SURG: CPT | Performed by: PATHOLOGY

## 2019-09-25 PROCEDURE — 50200 RENAL BIOPSY PERQ: CPT | Performed by: STUDENT IN AN ORGANIZED HEALTH CARE EDUCATION/TRAINING PROGRAM

## 2019-09-25 PROCEDURE — 99152 MOD SED SAME PHYS/QHP 5/>YRS: CPT

## 2019-09-25 PROCEDURE — 0TB13ZX EXCISION OF LEFT KIDNEY, PERCUTANEOUS APPROACH, DIAGNOSTIC: ICD-10-PCS | Performed by: STUDENT IN AN ORGANIZED HEALTH CARE EDUCATION/TRAINING PROGRAM

## 2019-09-25 PROCEDURE — 88350 IMFLUOR EA ADDL 1ANTB STN PX: CPT | Performed by: INTERNAL MEDICINE

## 2019-09-25 PROCEDURE — 50200 RENAL BIOPSY PERQ: CPT

## 2019-09-25 PROCEDURE — 99232 SBSQ HOSP IP/OBS MODERATE 35: CPT | Performed by: HOSPITALIST

## 2019-09-25 PROCEDURE — 83735 ASSAY OF MAGNESIUM: CPT | Performed by: INTERNAL MEDICINE

## 2019-09-25 PROCEDURE — 85025 COMPLETE CBC W/AUTO DIFF WBC: CPT | Performed by: INTERNAL MEDICINE

## 2019-09-25 PROCEDURE — 76942 ECHO GUIDE FOR BIOPSY: CPT | Performed by: STUDENT IN AN ORGANIZED HEALTH CARE EDUCATION/TRAINING PROGRAM

## 2019-09-25 RX ORDER — LIDOCAINE WITH 8.4% SOD BICARB 0.9%(10ML)
SYRINGE (ML) INJECTION CODE/TRAUMA/SEDATION MEDICATION
Status: COMPLETED | OUTPATIENT
Start: 2019-09-25 | End: 2019-09-25

## 2019-09-25 RX ORDER — MIDAZOLAM HYDROCHLORIDE 1 MG/ML
INJECTION INTRAMUSCULAR; INTRAVENOUS CODE/TRAUMA/SEDATION MEDICATION
Status: COMPLETED | OUTPATIENT
Start: 2019-09-25 | End: 2019-09-25

## 2019-09-25 RX ORDER — FENTANYL CITRATE 50 UG/ML
INJECTION, SOLUTION INTRAMUSCULAR; INTRAVENOUS CODE/TRAUMA/SEDATION MEDICATION
Status: COMPLETED | OUTPATIENT
Start: 2019-09-25 | End: 2019-09-25

## 2019-09-25 RX ADMIN — ONDANSETRON 4 MG: 2 INJECTION INTRAMUSCULAR; INTRAVENOUS at 20:07

## 2019-09-25 RX ADMIN — FENTANYL CITRATE 50 MCG: 50 INJECTION INTRAMUSCULAR; INTRAVENOUS at 15:07

## 2019-09-25 RX ADMIN — MIDAZOLAM HYDROCHLORIDE 0.5 MG: 1 INJECTION, SOLUTION INTRAMUSCULAR; INTRAVENOUS at 14:58

## 2019-09-25 RX ADMIN — MIDAZOLAM HYDROCHLORIDE 0.5 MG: 1 INJECTION, SOLUTION INTRAMUSCULAR; INTRAVENOUS at 15:21

## 2019-09-25 RX ADMIN — FENTANYL CITRATE 25 MCG: 50 INJECTION INTRAMUSCULAR; INTRAVENOUS at 15:00

## 2019-09-25 RX ADMIN — MIDAZOLAM HYDROCHLORIDE 0.5 MG: 1 INJECTION, SOLUTION INTRAMUSCULAR; INTRAVENOUS at 15:03

## 2019-09-25 RX ADMIN — ATORVASTATIN CALCIUM 20 MG: 20 TABLET, FILM COATED ORAL at 17:13

## 2019-09-25 RX ADMIN — MIDAZOLAM HYDROCHLORIDE 1 MG: 1 INJECTION, SOLUTION INTRAMUSCULAR; INTRAVENOUS at 15:10

## 2019-09-25 RX ADMIN — ACETAMINOPHEN 650 MG: 325 TABLET, FILM COATED ORAL at 20:07

## 2019-09-25 RX ADMIN — LIDOCAINE HYDROCHLORIDE 10 ML: 10 INJECTION, SOLUTION INFILTRATION; PERINEURAL at 15:13

## 2019-09-25 RX ADMIN — MIDAZOLAM HYDROCHLORIDE 1 MG: 1 INJECTION, SOLUTION INTRAMUSCULAR; INTRAVENOUS at 15:07

## 2019-09-25 RX ADMIN — FENTANYL CITRATE 25 MCG: 50 INJECTION INTRAMUSCULAR; INTRAVENOUS at 15:10

## 2019-09-25 RX ADMIN — MIDAZOLAM HYDROCHLORIDE 0.5 MG: 1 INJECTION, SOLUTION INTRAMUSCULAR; INTRAVENOUS at 15:13

## 2019-09-25 NOTE — PLAN OF CARE
Problem: PAIN - ADULT  Goal: Verbalizes/displays adequate comfort level or baseline comfort level  Description  Interventions:  - Encourage patient to monitor pain and request assistance  - Assess pain using appropriate pain scale  - Administer analgesics based on type and severity of pain and evaluate response  - Implement non-pharmacological measures as appropriate and evaluate response  - Consider cultural and social influences on pain and pain management  - Notify physician/advanced practitioner if interventions unsuccessful or patient reports new pain  Outcome: Progressing     Problem: INFECTION - ADULT  Goal: Absence or prevention of progression during hospitalization  Description  INTERVENTIONS:  - Assess and monitor for signs and symptoms of infection  - Monitor lab/diagnostic results  - Monitor all insertion sites, i e  indwelling lines, tubes, and drains  - Monitor endotracheal if appropriate and nasal secretions for changes in amount and color  - [place] appropriate cooling/warming therapies per order  - Administer medications as ordered  - Instruct and encourage patient and family to use good hand hygiene technique  - Identify and instruct in appropriate isolation precautions for identified infection/condition  Outcome: Progressing     Problem: SAFETY ADULT  Goal: Patient will remain free of falls  Description  INTERVENTIONS:  - Assess patient frequently for physical needs  -  Identify cognitive and physical deficits and behaviors that affect risk of falls    -  [place] fall precautions as indicated by assessment   - Educate patient/family on patient safety including physical limitations  - Instruct patient to call for assistance with activity based on assessment  - Modify environment to reduce risk of injury  - Consider OT/PT consult to assist with strengthening/mobility  Outcome: Progressing  Goal: Maintain or return to baseline ADL function  Description  INTERVENTIONS:  -  Assess patient's ability to carry out ADLs; assess patient's baseline for ADL function and identify physical deficits which impact ability to perform ADLs (bathing, care of mouth/teeth, toileting, grooming, dressing, etc )  - Assess/evaluate cause of self-care deficits   - Assess range of motion  - Assess patient's mobility; develop plan if impaired  - Assess patient's need for assistive devices and provide as appropriate  - Encourage maximum independence but intervene and supervise when necessary  - Involve family in performance of ADLs  - Assess for home care needs following discharge   - Consider OT consult to assist with ADL evaluation and planning for discharge  - Provide patient education as appropriate  Outcome: Progressing

## 2019-09-25 NOTE — PROGRESS NOTES
Progress Note - Karoline Robledo 1975, 45 y o  female MRN: [id number]    Unit/Bed#: -01 Encounter: [id number]    Primary Care Provider: No primary care provider on file  Date and time admitted to hospital: 9/22/2019  9:51 AM        * Bilateral edema of lower extremity  Assessment & Plan  · Patient is a 45year old female, previously healthy, who developed gastroenteritis almost 4 weeks ago with nausea, vomiting and diarrhea  She then developed bilateral lower extremity edema which got progressively worse, especially after standing for several hours  This, then, progressed to anasarca  Patient states she has gained over 20 lbs in less than 3 weeks  Patient went to see her PCP who ordered lab tests that showed hypoalbumineamia (0 9 g/dl), Proteinuria (>300 mg/dl)  Lipid panel showed hyperlipidemia  · Proteinuria, edema, and hypoalbuminemia, with hyperlipidemia points towards nephrotic syndrome  ·   Plan:  · Nephrology on board  · US of kidney and bladder showed: No hydronephrosis or shadowing calculi  Trace right perinephric fluid  Unremarkable bladder  · Labs ordered:  · Hepatitis panel non-reactive  · C3 level of 127  · C4 level of 30  · Protein electrophoresis urine and blood showed no monoclonal bands  · Free light chains showed Kappa 25, Lambda 28  · For renal biopsy today, airway evaluation done by ENT, input appreciated: No upper airway obstruction based on fiberoptic exam at this time    Overall unremarkable ENT exam  Cleared from ENT perspective for sedation and/or anesthesia  · Updated IR office regarding patient's status, patient will undergo biopsy sometime this afternoon  · Updated Nephro regarding plans  · With good response to diuretic, decreased torsemide to 5mg daily    · Was placed on NPO  · Started on Atorvastatin 20mg daily  · AC held, will undergo biopsy today  · Monitor I/O  · Monitor Weight  · Monitor CBC and CMP    Hypoalbuminemia  Assessment & Plan  Albumin on admission is 0 9 g/dl  Current albumin today 0 8 (see above for more details)  Plan:  · Nephrology on board  See plans above    Proteinuria  Assessment & Plan  Urine macroscopic study on admission shows Urine Protein >300 mg/dl  This, in combination with patient's other symptoms and lab results, is suggestive of nephrotic syndrome  (see plan above for more details)    Uvular edema  Assessment & Plan  Patient presented with history of uvular edema  Was given one dose of IV Decadron at the ED  Patient responded well  · Seen by ENT yesterday, no further intervention from their stand point  Will await further tests requested such as C1 esterase result  · Reached out to ENT today for evaluation of airway for biopsy procedure tomorrow as per IR recommendation  Appreciate input:  · No upper airway obstruction based on fiberoptic exam at this time  Overall unremarkable ENT exam  Cleared from ENT perspective for sedation and/or anesthesia        VTE Pharmacologic Prophylaxis:   Pharmacologic: On hold, patient will undergo renal biopsy today  Mechanical VTE Prophylaxis in Place: Yes    Discussions with Specialists or Other Care Team Provider: Discussed with nurse, attending, IR, ENT, Nephro    Education and Discussions with Family / Patient: Discussed with patient    Current Length of Stay: 1 day(s)    Current Patient Status: Inpatient     Discharge Plan / Estimated Discharge Date: TBD pending results of renal biopsy    Code Status: Level 1 - Full Code      Subjective:   Patient seen and examined today  Patient noted no spotting/bleeding overnight, however with slight spotting again this morning  Patient's AC held for biopsy scheduled this afternoon  Patient noted increased swelling of feet last night, usually she reports that swelling worsens at the end of the day  She reports that she lost almost 5lbs  No chest pain, no dyspnea, no dizziness  Patient has no other complaints      Objective:     Vitals:   Temp (24hrs), Av 1 °F (36 7 °C), Min:97 9 °F (36 6 °C), Max:98 5 °F (36 9 °C)    Temp:  [97 9 °F (36 6 °C)-98 5 °F (36 9 °C)] 98 °F (36 7 °C)  HR:  [] 100  Resp:  [16-18] 16  BP: ()/(55-67) 99/67  SpO2:  [97 %-99 %] 97 %  Body mass index is 22 55 kg/m²  Input and Output Summary (last 24 hours): Intake/Output Summary (Last 24 hours) at 9/25/2019 1057  Last data filed at 9/24/2019 1645  Gross per 24 hour   Intake 240 ml   Output 1800 ml   Net -1560 ml       Physical Exam:     Physical Exam   Constitutional: She is oriented to person, place, and time  She appears well-developed and well-nourished  No distress  HENT:   Head: Normocephalic and atraumatic  Mouth/Throat: Oropharynx is clear and moist  No oropharyngeal exudate  Eyes: Pupils are equal, round, and reactive to light  EOM are normal  No scleral icterus  Neck: Normal range of motion  Neck supple  No JVD present  Cardiovascular: Normal rate, regular rhythm, normal heart sounds and intact distal pulses  Pulmonary/Chest: Effort normal and breath sounds normal  No respiratory distress  Abdominal: Soft  Bowel sounds are normal  She exhibits no distension  There is no tenderness  There is no guarding  Musculoskeletal: Normal range of motion  She exhibits edema (bilateral LE)  She exhibits no tenderness  Lymphadenopathy:     She has no cervical adenopathy  Neurological: She is alert and oriented to person, place, and time  No cranial nerve deficit or sensory deficit  She exhibits normal muscle tone  Skin: Skin is warm  No rash noted  She is not diaphoretic  No erythema  Psychiatric: She has a normal mood and affect  Her behavior is normal  Thought content normal    Nursing note and vitals reviewed          Additional Data:     Labs:    Results from last 7 days   Lab Units 09/25/19  0507   WBC Thousand/uL 6 77   HEMOGLOBIN g/dL 12 6   HEMATOCRIT % 38 5   PLATELETS Thousands/uL 291   NEUTROS PCT % 36*   LYMPHS PCT % 44   MONOS PCT % 9   EOS PCT % 10* Results from last 7 days   Lab Units 09/25/19  0507 09/24/19  0605   POTASSIUM mmol/L 3 8 4 1   CHLORIDE mmol/L 110* 110*   CO2 mmol/L 29 26   BUN mg/dL 14 15   CREATININE mg/dL 0 61 0 60   CALCIUM mg/dL 7 7* 7 5*   ALK PHOS U/L  --  45*   ALT U/L  --  29   AST U/L  --  30     Results from last 7 days   Lab Units 09/23/19  0903   INR  1 01       * I Have Reviewed All Lab Data Listed Above  * Additional Pertinent Lab Tests Reviewed: Dominic 68 Admission Reviewed    Imaging:    Imaging Reports Reviewed Today Include: None  Imaging Personally Reviewed by Myself Includes:  None    Recent Cultures (last 7 days):           Last 24 Hours Medication List:     Current Facility-Administered Medications:  acetaminophen 650 mg Oral Q6H PRN Ponce Kelly MD   atorvastatin 20 mg Oral Daily With Zelia Cogan, MD   nicotine 1 patch Transdermal Daily Ponce Kelly MD   ondansetron 4 mg Intravenous Q6H PRN Ponce Kelly MD   torsemide 5 mg Oral Daily [facility], MD        Today, Patient Was Seen By: Dale Maza MD    ** Please Note: This note has been constructed using a voice recognition system   **

## 2019-09-25 NOTE — ASSESSMENT & PLAN NOTE
Albumin on admission is 0 9 g/dl  Plan:  · Patient was seen by Nephrology  · S/p renal biopsy  · Will follow up with Dr Chi Torres as outpatient

## 2019-09-25 NOTE — ASSESSMENT & PLAN NOTE
Patient presented with history of uvular edema  Was given one dose of IV Decadron at the ED  Patient responded well  · Seen by ENT yesterday, no further intervention from their stand point  Will await further tests requested such as C1 esterase result  · Reached out to ENT today for evaluation of airway for biopsy procedure tomorrow as per IR recommendation  Appreciate input:  · No upper airway obstruction based on fiberoptic exam at this time    Overall unremarkable ENT exam  Cleared from ENT perspective for sedation and/or anesthesia

## 2019-09-25 NOTE — ASSESSMENT & PLAN NOTE
Patient presented with history of uvular edema  Was given one dose of IV Decadron at the ED  Patient responded well    · Seen by ENT, overall unremarkable exam  No further intervention for now  · Awaiting C1 esterase results

## 2019-09-25 NOTE — PLAN OF CARE
Problem: PAIN - ADULT  Goal: Verbalizes/displays adequate comfort level or baseline comfort level  Description  Interventions:  - Encourage patient to monitor pain and request assistance  - Assess pain using appropriate pain scale  - Administer analgesics based on type and severity of pain and evaluate response  - Implement non-pharmacological measures as appropriate and evaluate response  - Consider cultural and social influences on pain and pain management  - Notify physician/advanced practitioner if interventions unsuccessful or patient reports new pain  Outcome: Progressing     Problem: INFECTION - ADULT  Goal: Absence or prevention of progression during hospitalization  Description  INTERVENTIONS:  - Assess and monitor for signs and symptoms of infection  - Monitor lab/diagnostic results  - Monitor all insertion sites, i e  indwelling lines, tubes, and drains  - Monitor endotracheal if appropriate and nasal secretions for changes in amount and color  - [place] appropriate cooling/warming therapies per order  - Administer medications as ordered  - Instruct and encourage patient and family to use good hand hygiene technique  - Identify and instruct in appropriate isolation precautions for identified infection/condition  Outcome: Progressing  Goal: Absence of fever/infection during neutropenic period  Description  INTERVENTIONS:  - Monitor WBC    Outcome: Progressing     Problem: SAFETY ADULT  Goal: Patient will remain free of falls  Description  INTERVENTIONS:  - Assess patient frequently for physical needs  -  Identify cognitive and physical deficits and behaviors that affect risk of falls    -  [place] fall precautions as indicated by assessment   - Educate patient/family on patient safety including physical limitations  - Instruct patient to call for assistance with activity based on assessment  - Modify environment to reduce risk of injury  - Consider OT/PT consult to assist with strengthening/mobility  Outcome: Progressing  Goal: Maintain or return to baseline ADL function  Description  INTERVENTIONS:  -  Assess patient's ability to carry out ADLs; assess patient's baseline for ADL function and identify physical deficits which impact ability to perform ADLs (bathing, care of mouth/teeth, toileting, grooming, dressing, etc )  - Assess/evaluate cause of self-care deficits   - Assess range of motion  - Assess patient's mobility; develop plan if impaired  - Assess patient's need for assistive devices and provide as appropriate  - Encourage maximum independence but intervene and supervise when necessary  - Involve family in performance of ADLs  - Assess for home care needs following discharge   - Consider OT consult to assist with ADL evaluation and planning for discharge  - Provide patient education as appropriate  Outcome: Progressing  Goal: Maintain or return mobility status to optimal level  Description  INTERVENTIONS:  - Assess patient's baseline mobility status (ambulation, transfers, stairs, etc )    - Identify cognitive and physical deficits and behaviors that affect mobility  - Identify mobility aids required to assist with transfers and/or ambulation (gait belt, sit-to-stand, lift, walker, cane, etc )  - [place] fall precautions as indicated by assessment  - Record patient progress and toleration of activity level on Mobility SBAR; progress patient to next Phase/Stage  - Instruct patient to call for assistance with activity based on assessment  - Consider rehabilitation consult to assist with strengthening/weightbearing, etc   Outcome: Progressing     Problem: DISCHARGE PLANNING  Goal: Discharge to home or other facility with appropriate resources  Description  INTERVENTIONS:  - Identify barriers to discharge w/patient and caregiver  - Arrange for needed discharge resources and transportation as appropriate  - Identify discharge learning needs (meds, wound care, etc )  - Arrange for interpretive services to assist at discharge as needed  - Refer to Case Management Department for coordinating discharge planning if the patient needs post-hospital services based on physician/advanced practitioner order or complex needs related to functional status, cognitive ability, or social support system  Outcome: Progressing     Problem: Knowledge Deficit  Goal: Patient/family/caregiver demonstrates understanding of disease process, treatment plan, medications, and discharge instructions  Description  Complete learning assessment and assess knowledge base  Interventions:  - Provide teaching at level of understanding  - Provide teaching via preferred learning methods  Outcome: Progressing     Problem: Nutrition/Hydration-ADULT  Goal: Nutrient/Hydration intake appropriate for improving, restoring or maintaining nutritional needs  Description  Monitor and assess patient's nutrition/hydration status for malnutrition  Collaborate with interdisciplinary team and initiate plan and interventions as ordered  Monitor patient's weight and dietary intake as ordered or per policy  Utilize nutrition screening tool and intervene as necessary  Determine patient's food preferences and provide high-protein, high-caloric foods as appropriate       INTERVENTIONS:  - Monitor oral intake, urinary output, labs, and treatment plans  - Assess nutrition and hydration status and recommend course of action  - Evaluate amount of meals eaten  - Assist patient with eating if necessary   - Allow adequate time for meals  - Recommend/ encourage appropriate diets, oral nutritional supplements, and vitamin/mineral supplements  - Order, calculate, and assess calorie counts as needed  - Recommend, monitor, and adjust tube feedings and TPN/PPN based on assessed needs  - Assess need for intravenous fluids  - Provide specific nutrition/hydration education as appropriate  - Include patient/family/caregiver in decisions related to nutrition  Outcome: Progressing     Problem: METABOLIC, FLUID AND ELECTROLYTES - ADULT  Goal: Electrolytes maintained within normal limits  Description  INTERVENTIONS:  - Monitor labs and assess patient for signs and symptoms of electrolyte imbalances  - Administer electrolyte replacement as ordered  - Monitor response to electrolyte replacements, including repeat lab results as appropriate  - Instruct patient on fluid and nutrition as appropriate  Outcome: Progressing

## 2019-09-25 NOTE — PROGRESS NOTES
[address] [facility] NOTE   Tiffany Lynn 45 y o  female MRN: [id number]  Unit/Bed#: -01 Encounter: [id number]  Reason for Consult: bilateral lower extremity edema in the setting of proteinuria and hypoalbuminemia    ASSESSMENT and PLAN:  1  Nephrotic syndrome:   Patient with bilateral lower edema + proteinuria + hypoalbuminemia + high cholesterol which all support nephrotic syndrome diagnosis     DDX: membranous nephropathy vs focal segmental glomerulonephritis vs minimal changes disease     Work up and plan:   · Continue to monitor I/O with fluid restriction and Na restriction   · Continue torsemide 5 mg daily  Close monitor of blood pressure  · Hepatitis panel was normal  · C3 of 127 C4 of 30  ZAYNAB: negative  · Serum and urine electrophoresis revealed no monoclonal bands  · US of kidney and bladder showed: No hydronephrosis or shadowing calculi  Trace right perinephric fluid  Unremarkable bladder  · Renal biopsy scheduled for today afternoon with IR, patient cleared by ENT for sedation and/or anesthesia      2  Hyperlipidemia: secondary to nephrotic syndrome  · Continue atorvastatin     3  Uvular edema   Patient denies shortness of breath     · C1 esterase pending   · ENT following up     DISPOSITION:  Will follow up of renal biopsy result in outpatient clinic     SUBJECTIVE / INTERVAL HISTORY:  Patient was seen and examined today, she denies SOB, chest pain, abdominal pain or urinary symptoms   No facial or periorbital swelling noted and her leg swelling improving compared to yesterday     OBJECTIVE:  Current Weight: Weight - Scale: 59 6 kg (131 lb 6 4 oz)  Vitals:    09/24/19 1550 09/24/19 2221 09/25/19 0547 09/25/19 0700   BP: 110/67 91/55  99/67   BP Location: Left arm Left arm  Left arm   Pulse: 92 83  100   Resp: 18 18  16   Temp: 98 5 °F (36 9 °C) 97 9 °F (36 6 °C)  98 °F (36 7 °C)   TempSrc: Oral Oral  Oral   SpO2: 99% 97%  97%   Weight:   59 6 kg (131 lb 6 4 oz)    Height: Intake/Output Summary (Last 24 hours) at 9/25/2019 0758  Last data filed at 9/24/2019 1645  Gross per 24 hour   Intake 240 ml   Output 2600 ml   Net -2360 ml     Physical Exam   Constitutional: She is oriented to person, place, and time  She appears well-developed  No distress  HENT:   Mouth/Throat: Oropharynx is clear and moist  No oropharyngeal exudate  Eyes: No scleral icterus  Neck: Neck supple  No JVD present  Cardiovascular: Normal rate  Pulmonary/Chest: Effort normal and breath sounds normal    Abdominal: Soft  Bowel sounds are normal  There is no tenderness  Musculoskeletal: She exhibits edema  Neurological: She is alert and oriented to person, place, and time  Skin: Skin is warm and dry  She is not diaphoretic  Psychiatric: She has a normal mood and affect  Her behavior is normal    Vitals reviewed      Medications:    Current Facility-Administered Medications:     acetaminophen (TYLENOL) tablet 650 mg, 650 mg, Oral, Q6H PRN, Neeta Rodriguez MD    atorvastatin (LIPITOR) tablet 20 mg, 20 mg, Oral, Daily With Abdiel Karmer MD, 20 mg at 09/24/19 1702    nicotine (NICODERM CQ) 7 mg/24hr TD 24 hr patch 1 patch, 1 patch, Transdermal, Daily, Neeta Rodriguez MD    ondansetron [facility]) injection 4 mg, 4 mg, Intravenous, Q6H PRN, Neeta Rodriguez MD    torsemide [facility]) tablet 5 mg, 5 mg, Oral, Daily, Karlo Quiros MD    Laboratory Results:  Results from last 7 days   Lab Units 09/25/19  0507 09/24/19  0605 09/23/19  0458 09/22/19  1059   WBC Thousand/uL 6 77 7 90 10 33* 9 52   HEMOGLOBIN g/dL 12 6 12 1 12 5 13 3   HEMATOCRIT % 38 5 37 1 37 9 41 1   PLATELETS Thousands/uL 291 292 328 328   POTASSIUM mmol/L 3 8 4 1 3 8 3 9   CHLORIDE mmol/L 110* 110* 111* 110*   CO2 mmol/L 29 26 27 30   BUN mg/dL 14 15 14 9   CREATININE mg/dL 0 61 0 60 0 54* 0 51*   CALCIUM mg/dL 7 7* 7 5* 7 5* 7 2*   MAGNESIUM mg/dL 1 9  --   --   --

## 2019-09-25 NOTE — SEDATION DOCUMENTATION
Left kidney biopsy done by Dr Tammy Zambrano  Patient tolerated well and denies pain  VSS  Patient returned to room

## 2019-09-25 NOTE — PROGRESS NOTES
Interventional Radiology Preprocedure Note    History/Indication for procedure:   Shauna Borja is a 45 y o  female with a PMH of nephrotic syndrome of unclear etiology  She presents for US guided nontargeted renal biopsy  Relevant past medical history:    History reviewed  No pertinent past medical history  Patient Active Problem List   Diagnosis    Bilateral edema of lower extremity    Hypoalbuminemia    Proteinuria    Elevated brain natriuretic peptide (BNP) level    Uvular edema       BP 99/67 (BP Location: Left arm)   Pulse 100   Temp 98 °F (36 7 °C) (Oral)   Resp 16   Ht 5' 4" (1 626 m)   Wt 59 6 kg (131 lb 6 4 oz)   LMP 09/03/2019   SpO2 97%   BMI 22 55 kg/m²     Medications:    Inpatient Medications:     Scheduled Medications:    Current Facility-Administered Medications:  acetaminophen 650 mg Oral Q6H PRN Janet Nava MD   atorvastatin 20 mg Oral Daily With [facility] MD   nicotine 1 patch Transdermal Daily Janet Nava MD   ondansetron 4 mg Intravenous Q6H PRN Janet Nava MD   torsemide 5 mg Oral Daily Natalia Cee MD       Infusions:       PRN:    acetaminophen    ondansetron    Outpatient Medications:  No current facility-administered medications on file prior to encounter  No current outpatient medications on file prior to encounter  No Known Allergies    Anticoagulants: none    ASA classification: ASA 3 - Patient with moderate systemic disease with functional limitations    Airway Assessment: II (hard and soft palate, upper portion of tonsils anduvula visible)    Relevant family history: None    Relevant review of systems: None    Prior sedation/anesthesia: yes    Can the patient lie flat? Yes     Does patient have sleep apnea?  No    If yes, does patient use CPAP? not applicable    NPO Status: yes    Labs:   CBC with diff: Lab Results   Component Value Date    WBC 6 77 09/25/2019    HGB 12 6 09/25/2019    HCT 38 5 09/25/2019    MCV 91 09/25/2019     09/25/2019    MCH 29 9 09/25/2019    MCHC 32 7 09/25/2019    RDW 12 5 09/25/2019    MPV 8 6 (L) 09/25/2019    NRBC 0 09/25/2019     BMP/CMP:  Lab Results   Component Value Date    K 3 8 09/25/2019     (H) 09/25/2019    CO2 29 09/25/2019    BUN 14 09/25/2019    CREATININE 0 61 09/25/2019    CALCIUM 7 7 (L) 09/25/2019    AST 30 09/24/2019    ALT 29 09/24/2019    ALKPHOS 45 (L) 09/24/2019    EGFR 112 09/25/2019   ,     Coags:   Lab Results   Component Value Date    PTT 29 09/23/2019    INR 1 01 09/23/2019   ,   Results from last 7 days   Lab Units 09/23/19  0903   PTT seconds 29   INR  1 01        Relevant imaging studies:   Reviewed  Directed physical examination:  I agree with the physical exam performed on 9/25/19 and there are no additional changes  Assessment/Plan:   Beatriz Shafer is a 45 y o  female with a PMH of nephrotic syndrome of unclear etiology  She presents for US guided nontargeted renal biopsy  Sedation/Anesthesia plan: Moderate sedation will be used as needed for procedure      Consent with alternatives to the procedure, risks and benefits have been explained and discussed with the patient/patient's family: yes

## 2019-09-25 NOTE — ASSESSMENT & PLAN NOTE
· Patient is a 45year old female, previously healthy, who developed gastroenteritis almost 4 weeks ago with nausea, vomiting and diarrhea  She then developed bilateral lower extremity edema which got progressively worse, especially after standing for several hours  This, then, progressed to anasarca  Patient states she has gained over 20 lbs in less than 3 weeks  Patient went to see her PCP who ordered lab tests that showed hypoalbumineamia (0 9 g/dl), Proteinuria (>300 mg/dl)  Lipid panel showed hyperlipidemia  · Proteinuria, edema, and hypoalbuminemia, with hyperlipidemia points towards nephrotic syndrome  ·   Plan:  · Nephrology on board  · US of kidney and bladder showed: No hydronephrosis or shadowing calculi  Trace right perinephric fluid  Unremarkable bladder  · Labs ordered:  · Hepatitis panel non-reactive  · C3 level of 127  · C4 level of 30  · Protein electrophoresis urine and blood showed no monoclonal bands  · Awaiting ZAYNAB and free light chains results  · For renal biopsy today, airway evaluation done by ENT, input appreciated: No upper airway obstruction based on fiberoptic exam at this time    Overall unremarkable ENT exam  Cleared from ENT perspective for sedation and/or anesthesia  · Updated IR office regarding patient's status, patient will undergo biopsy sometime this afternoon  · Updated Nephro regarding plans  · With good response to diuretic, decreased torsemide to 5mg daily    · Was placed on NPO  · Started on Atorvastatin 20mg daily  · AC held, will undergo biopsy today  · Monitor I/O  · Monitor Weight  · Monitor CBC and CMP

## 2019-09-25 NOTE — ASSESSMENT & PLAN NOTE
· Patient is a 45year old female, previously healthy, who developed gastroenteritis almost 4 weeks ago with nausea, vomiting and diarrhea  She then developed bilateral lower extremity edema which got progressively worse, especially after standing for several hours  This, then, progressed to anasarca  Patient states she has gained over 20 lbs in less than 3 weeks  Patient went to see her PCP who ordered lab tests that showed hypoalbumineamia (0 9 g/dl), Proteinuria (>300 mg/dl)  Lipid panel showed hyperlipidemia  · Proteinuria, edema, and hypoalbuminemia, with hyperlipidemia points towards nephrotic syndrome  ·   Plan:  · Nephrology on board  · US of kidney and bladder showed: No hydronephrosis or shadowing calculi  Trace right perinephric fluid  Unremarkable bladder  · Labs ordered:  · Hepatitis panel non-reactive  · C3 level of 127  · C4 level of 30  · Protein electrophoresis urine and blood showed no monoclonal bands  · Free light chains showed Kappa 25, Lambda 28  · S/p renal biopsy, samples sent for further study and analysis   No complications noted  · Patient will be discharged with home meds:  · Atorvastatin 20mg daily  · Torsemide 5mg daily  · Follow up with Nephro Dr Steven Grimaldo as outpatient

## 2019-09-25 NOTE — BRIEF OP NOTE (RAD/CATH)
US guided renal biopsy Procedure Note    PATIENT NAME:   : 1975  MRN: [id number]     Pre-op Diagnosis:   1  Uvular edema    2  Anasarca    3  Proteinuria    4  Hypoalbuminemia    5  Nephrotic syndrome    6  Bilateral edema of lower extremity      Post-op Diagnosis:   1  Uvular edema    2  Anasarca    3  Proteinuria    4  Hypoalbuminemia    5  Nephrotic syndrome    6  Bilateral edema of lower extremity        Surgeon:   Victorino Funez MD  Assistants:     No qualified resident was available, Resident is only observing    Estimated Blood Loss: 5 mL  Findings:   US guided nontargeted L [place] renal biopsy  5 core biopsy samples obtained  Deemed adequate by pathology  Specimens: Sent to path  Complications:  None  Anesthesia: Conscious sedation    Victorino Funez MD     Date: 2019  Time: 4:46 PM    Thank you for allowing me to participate in the care of   Please don't hesitate to call, text, email, or TigerText with any questions  Victorino Funez MD  Interventional Radiologist  [facility]  Personal Cell: [phone]  [email or web address]

## 2019-09-26 VITALS
SYSTOLIC BLOOD PRESSURE: 108 MMHG | OXYGEN SATURATION: 93 % | RESPIRATION RATE: 18 BRPM | TEMPERATURE: 98.5 F | HEIGHT: 64 IN | HEART RATE: 83 BPM | DIASTOLIC BLOOD PRESSURE: 74 MMHG | WEIGHT: 130.07 LBS | BODY MASS INDEX: 22.21 KG/M2

## 2019-09-26 LAB
ANION GAP SERPL CALCULATED.3IONS-SCNC: 2 MMOL/L (ref 4–13)
BASOPHILS # BLD AUTO: 0.07 THOUSANDS/ΜL (ref 0–0.1)
BASOPHILS NFR BLD AUTO: 1 % (ref 0–1)
BUN SERPL-MCNC: 12 MG/DL (ref 5–25)
C1INH SERPL-MCNC: 44 MG/DL (ref 21–39)
CALCIUM SERPL-MCNC: 7.1 MG/DL (ref 8.3–10.1)
CHLORIDE SERPL-SCNC: 107 MMOL/L (ref 100–108)
CO2 SERPL-SCNC: 29 MMOL/L (ref 21–32)
CREAT SERPL-MCNC: 0.57 MG/DL (ref 0.6–1.3)
EOSINOPHIL # BLD AUTO: 0.51 THOUSAND/ΜL (ref 0–0.61)
EOSINOPHIL NFR BLD AUTO: 7 % (ref 0–6)
ERYTHROCYTE [DISTWIDTH] IN BLOOD BY AUTOMATED COUNT: 12.2 % (ref 11.6–15.1)
GFR SERPL CREATININE-BSD FRML MDRD: 115 ML/MIN/1.73SQ M
GLUCOSE SERPL-MCNC: 86 MG/DL (ref 65–140)
HCT VFR BLD AUTO: 36.5 % (ref 34.8–46.1)
HGB BLD-MCNC: 11.8 G/DL (ref 11.5–15.4)
IMM GRANULOCYTES # BLD AUTO: 0.01 THOUSAND/UL (ref 0–0.2)
IMM GRANULOCYTES NFR BLD AUTO: 0 % (ref 0–2)
LYMPHOCYTES # BLD AUTO: 2.8 THOUSANDS/ΜL (ref 0.6–4.47)
LYMPHOCYTES NFR BLD AUTO: 38 % (ref 14–44)
MCH RBC QN AUTO: 29.6 PG (ref 26.8–34.3)
MCHC RBC AUTO-ENTMCNC: 32.3 G/DL (ref 31.4–37.4)
MCV RBC AUTO: 92 FL (ref 82–98)
MONOCYTES # BLD AUTO: 0.57 THOUSAND/ΜL (ref 0.17–1.22)
MONOCYTES NFR BLD AUTO: 8 % (ref 4–12)
NEUTROPHILS # BLD AUTO: 3.41 THOUSANDS/ΜL (ref 1.85–7.62)
NEUTS SEG NFR BLD AUTO: 46 % (ref 43–75)
NRBC BLD AUTO-RTO: 0 /100 WBCS
PLATELET # BLD AUTO: 286 THOUSANDS/UL (ref 149–390)
PMV BLD AUTO: 8.5 FL (ref 8.9–12.7)
POTASSIUM SERPL-SCNC: 3.9 MMOL/L (ref 3.5–5.3)
RBC # BLD AUTO: 3.98 MILLION/UL (ref 3.81–5.12)
SODIUM SERPL-SCNC: 138 MMOL/L (ref 136–145)
WBC # BLD AUTO: 7.37 THOUSAND/UL (ref 4.31–10.16)

## 2019-09-26 PROCEDURE — 85025 COMPLETE CBC W/AUTO DIFF WBC: CPT | Performed by: HOSPITALIST

## 2019-09-26 PROCEDURE — 80048 BASIC METABOLIC PNL TOTAL CA: CPT | Performed by: HOSPITALIST

## 2019-09-26 PROCEDURE — 99239 HOSP IP/OBS DSCHRG MGMT >30: CPT | Performed by: HOSPITALIST

## 2019-09-26 RX ORDER — TORSEMIDE 5 MG/1
5 TABLET ORAL DAILY
Qty: 30 TABLET | Refills: 0 | Status: SHIPPED | OUTPATIENT
Start: 2019-09-27 | End: 2019-09-26

## 2019-09-26 RX ORDER — ATORVASTATIN CALCIUM 20 MG/1
20 TABLET, FILM COATED ORAL
Qty: 30 TABLET | Refills: 0 | Status: SHIPPED | OUTPATIENT
Start: 2019-09-26 | End: 2019-09-26

## 2019-09-26 RX ORDER — ATORVASTATIN CALCIUM 20 MG/1
20 TABLET, FILM COATED ORAL
Qty: 30 TABLET | Refills: 0 | Status: SHIPPED | OUTPATIENT
Start: 2019-09-26 | End: 2019-10-01 | Stop reason: SDUPTHER

## 2019-09-26 RX ORDER — TORSEMIDE 5 MG/1
5 TABLET ORAL DAILY
Qty: 30 TABLET | Refills: 0 | Status: SHIPPED | OUTPATIENT
Start: 2019-09-27 | End: 2019-10-01 | Stop reason: SDUPTHER

## 2019-09-26 NOTE — DISCHARGE SUMMARY
Discharge- Elias Elder 1975, 45 y o  female MRN: [id number]    Unit/Bed#: -01 Encounter: [id number]    Primary Care Provider: No primary care provider on file  Date and time admitted to hospital: 9/22/2019  9:51 AM      * Bilateral edema of lower extremity  Assessment & Plan  · Patient is a 45year old female, previously healthy, who developed gastroenteritis almost 4 weeks ago with nausea, vomiting and diarrhea  She then developed bilateral lower extremity edema which got progressively worse, especially after standing for several hours  This, then, progressed to anasarca  Patient states she has gained over 20 lbs in less than 3 weeks  Patient went to see her PCP who ordered lab tests that showed hypoalbumineamia (0 9 g/dl), Proteinuria (>300 mg/dl)  Lipid panel showed hyperlipidemia  · Proteinuria, edema, and hypoalbuminemia, with hyperlipidemia points towards nephrotic syndrome  ·   Plan:  · Nephrology on board  · US of kidney and bladder showed: No hydronephrosis or shadowing calculi  Trace right perinephric fluid  Unremarkable bladder  · Labs ordered:  · Hepatitis panel non-reactive  · C3 level of 127  · C4 level of 30  · Protein electrophoresis urine and blood showed no monoclonal bands  · Free light chains showed Kappa 25, Lambda 28  · S/p renal biopsy, samples sent for further study and analysis  No complications noted  · Patient will be discharged with home meds:  · Atorvastatin 20mg daily  · Torsemide 5mg daily  · Follow up with Nephrology Dr Cristy Strickland on 9/30 as outpatient    Hypoalbuminemia  Assessment & Plan  Albumin on admission is 0 9 g/dl  Plan:  · Patient was seen by Nephrology  · S/p renal biopsy  · Will follow up with Dr Cristy Strickland as outpatient    Proteinuria  Assessment & Plan  Urine macroscopic study on admission shows Urine Protein >300 mg/dl   This, in combination with patient's other symptoms and lab results, is suggestive of nephrotic syndrome  (see plan above for more details)    Uvular edema  Assessment & Plan  Patient presented with history of uvular edema  Was given one dose of IV Decadron at the ED  Patient responded well  · Seen by ENT, overall unremarkable exam  No further intervention for now  · Awaiting C1 esterase results        Discharging Resident Physician: Mateusz Blanca MD  Attending: No att  providers found  PCP: No primary care provider on file  Admission Date: 9/22/2019  Discharge Date: 09/26/19    Disposition:     Home    Reason for Admission:  Facial swelling, bilateral leg edema    Consultations During Hospital Stay:  · Nephrology  · ENT  · Interventional Radiology    Procedures Performed:     · Renal biopsy    Significant Findings / Test Results:     · Hepatitis panel non-reactive  · C3 level of 127  · C4 level of 30  · Protein electrophoresis urine and blood showed no monoclonal bands  · Free light chains showed Kappa 25, Lambda 28  · US of kidney and bladder showed: No hydronephrosis or shadowing calculi  Trace right perinephric fluid  Unremarkable bladder  Incidental Findings:   · None     Test Results Pending at Discharge (will require follow up):   · C1 esterase  · Anti phospholipid study     Outpatient Tests Requested:  · None    Complications:  None    Hospital Course:     Meghan Zelaya is a 45 y o  female patient who originally presented to the hospital on 9/22/2019 due to facial swelling and worsening bilateral leg edema  Patient also complained of weight gain  Labs done prior to admission showed hypoalbuminemia, and proteinuria  In the emergency department patient was noted to have uvular edema and she was given 1 dose of Decadron 10 mg IV  Patient noted improvement and had no further complaints  Patient was worked up for possible nephrotic syndrome given hypoalbuminemia, proteinuria, anasarca, and hyperlipidemia in laboratory values  Patient was referred to Nephrology, and labs were requested  See above for details  Patient was started on atorvastatin 20 mg daily and torsemide 10 mg initially  Patient had good response to diuresis and torsemide was then decreased to 5 mg daily  Patient was also started on anticoagulants  Patient was also scheduled for renal biopsy and tissue sample was sent for further analysis  Patient is sent home with atorvastatin 20 mg daily and torsemide 5 mg daily  Patient will follow up with Nephrology Dr Antonella Amin as outpatient to discuss pending results of tests  Condition at Discharge: stable     Discharge Day Visit / Exam:     Subjective:    Patient seen and examined today  Patient had no complaints, patient noted no complications after renal biopsy  Patient denies chest pain, dyspnea, abdominal pain  Patient still with leg edema  Vitals: Blood Pressure: 108/74 (09/26/19 0803)  Pulse: 83 (09/26/19 0803)  Temperature: 98 5 °F (36 9 °C) (09/26/19 0803)  Temp Source: Oral (09/26/19 0803)  Respirations: 18 (09/26/19 0803)  Height: 5' 4" (162 6 cm) (09/23/19 0600)  Weight - Scale: 59 kg (130 lb 1 1 oz) (09/26/19 0558)  SpO2: 93 % (09/26/19 0803)     Exam:   Physical Exam   Constitutional: She is oriented to person, place, and time  She appears well-developed and well-nourished  No distress  HENT:   Head: Normocephalic and atraumatic  Mouth/Throat: Oropharynx is clear and moist  No oropharyngeal exudate  Eyes: Pupils are equal, round, and reactive to light  Conjunctivae and EOM are normal  No scleral icterus  Neck: Normal range of motion  Neck supple  No JVD present  Cardiovascular: Normal rate, regular rhythm, normal heart sounds and intact distal pulses  Pulmonary/Chest: Effort normal and breath sounds normal  No respiratory distress  Abdominal: Soft  Bowel sounds are normal  There is no tenderness  There is no guarding  Musculoskeletal: Normal range of motion  She exhibits edema (bilateral LE)  She exhibits no tenderness or deformity     Lymphadenopathy:     She has no cervical adenopathy  Neurological: She is alert and oriented to person, place, and time  No sensory deficit  She exhibits normal muscle tone  Skin: Skin is warm  No rash noted  No erythema  Psychiatric: She has a normal mood and affect  Her behavior is normal  Thought content normal    Nursing note and vitals reviewed  Discussion with Family: Discussed with patient    Discharge instructions/Information to patient and family:   See after visit summary for information provided to patient and family  Provisions for Follow-Up Care:  See after visit summary for information related to follow-up care and any pertinent home health orders  Planned Readmission: None     Discharge Medications:  See after visit summary for reconciled discharge medications provided to patient and family        ** Please Note: This note has been constructed using a voice recognition system **

## 2019-09-26 NOTE — PLAN OF CARE
Problem: PAIN - ADULT  Goal: Verbalizes/displays adequate comfort level or baseline comfort level  Description  Interventions:  - Encourage patient to monitor pain and request assistance  - Assess pain using appropriate pain scale  - Administer analgesics based on type and severity of pain and evaluate response  - Implement non-pharmacological measures as appropriate and evaluate response  - Consider cultural and social influences on pain and pain management  - Notify physician/advanced practitioner if interventions unsuccessful or patient reports new pain  9/26/2019 1030 by Emily Wang RN  Outcome: Adequate for Discharge  9/26/2019 0827 by Emily Wang RN  Outcome: Progressing     Problem: INFECTION - ADULT  Goal: Absence or prevention of progression during hospitalization  Description  INTERVENTIONS:  - Assess and monitor for signs and symptoms of infection  - Monitor lab/diagnostic results  - Monitor all insertion sites, i e  indwelling lines, tubes, and drains  - Monitor endotracheal if appropriate and nasal secretions for changes in amount and color  - [place] appropriate cooling/warming therapies per order  - Administer medications as ordered  - Instruct and encourage patient and family to use good hand hygiene technique  - Identify and instruct in appropriate isolation precautions for identified infection/condition  9/26/2019 1030 by Emily Wang RN  Outcome: Adequate for Discharge  9/26/2019 0827 by Emily Wang RN  Outcome: Progressing  Goal: Absence of fever/infection during neutropenic period  Description  INTERVENTIONS:  - Monitor WBC    Outcome: Adequate for Discharge     Problem: SAFETY ADULT  Goal: Patient will remain free of falls  Description  INTERVENTIONS:  - Assess patient frequently for physical needs  -  Identify cognitive and physical deficits and behaviors that affect risk of falls    -  [place] fall precautions as indicated by assessment   - Educate patient/family on patient safety including physical limitations  - Instruct patient to call for assistance with activity based on assessment  - Modify environment to reduce risk of injury  - Consider OT/PT consult to assist with strengthening/mobility  9/26/2019 1030 by Emily Wang RN  Outcome: Adequate for Discharge  9/26/2019 0827 by Emily Wang RN  Outcome: Progressing  Goal: Maintain or return to baseline ADL function  Description  INTERVENTIONS:  -  Assess patient's ability to carry out ADLs; assess patient's baseline for ADL function and identify physical deficits which impact ability to perform ADLs (bathing, care of mouth/teeth, toileting, grooming, dressing, etc )  - Assess/evaluate cause of self-care deficits   - Assess range of motion  - Assess patient's mobility; develop plan if impaired  - Assess patient's need for assistive devices and provide as appropriate  - Encourage maximum independence but intervene and supervise when necessary  - Involve family in performance of ADLs  - Assess for home care needs following discharge   - Consider OT consult to assist with ADL evaluation and planning for discharge  - Provide patient education as appropriate  9/26/2019 1030 by Emily Wang RN  Outcome: Adequate for Discharge  9/26/2019 0827 by Emily Wang RN  Outcome: Progressing  Goal: Maintain or return mobility status to optimal level  Description  INTERVENTIONS:  - Assess patient's baseline mobility status (ambulation, transfers, stairs, etc )    - Identify cognitive and physical deficits and behaviors that affect mobility  - Identify mobility aids required to assist with transfers and/or ambulation (gait belt, sit-to-stand, lift, walker, cane, etc )  - [place] fall precautions as indicated by assessment  - Record patient progress and toleration of activity level on Mobility SBAR; progress patient to next Phase/Stage  - Instruct patient to call for assistance with activity based on assessment  - Consider rehabilitation consult to assist with strengthening/weightbearing, etc   9/26/2019 1030 by Sarina Coats RN  Outcome: Adequate for Discharge  9/26/2019 0827 by Sarina Coats RN  Outcome: Progressing     Problem: DISCHARGE PLANNING  Goal: Discharge to home or other facility with appropriate resources  Description  INTERVENTIONS:  - Identify barriers to discharge w/patient and caregiver  - Arrange for needed discharge resources and transportation as appropriate  - Identify discharge learning needs (meds, wound care, etc )  - Arrange for interpretive services to assist at discharge as needed  - Refer to Case Management Department for coordinating discharge planning if the patient needs post-hospital services based on physician/advanced practitioner order or complex needs related to functional status, cognitive ability, or social support system  9/26/2019 1030 by Sarina Coats RN  Outcome: Adequate for Discharge  9/26/2019 0827 by Sarina Coats RN  Outcome: Progressing     Problem: Knowledge Deficit  Goal: Patient/family/caregiver demonstrates understanding of disease process, treatment plan, medications, and discharge instructions  Description  Complete learning assessment and assess knowledge base  Interventions:  - Provide teaching at level of understanding  - Provide teaching via preferred learning methods  9/26/2019 1030 by Sarina Coats RN  Outcome: Adequate for Discharge  9/26/2019 0827 by Sarina Coats RN  Outcome: Progressing     Problem: Nutrition/Hydration-ADULT  Goal: Nutrient/Hydration intake appropriate for improving, restoring or maintaining nutritional needs  Description  Monitor and assess patient's nutrition/hydration status for malnutrition  Collaborate with interdisciplinary team and initiate plan and interventions as ordered  Monitor patient's weight and dietary intake as ordered or per policy  Utilize nutrition screening tool and intervene as necessary   Determine patient's food preferences and provide high-protein, high-caloric foods as appropriate       INTERVENTIONS:  - Monitor oral intake, urinary output, labs, and treatment plans  - Assess nutrition and hydration status and recommend course of action  - Evaluate amount of meals eaten  - Assist patient with eating if necessary   - Allow adequate time for meals  - Recommend/ encourage appropriate diets, oral nutritional supplements, and vitamin/mineral supplements  - Order, calculate, and assess calorie counts as needed  - Recommend, monitor, and adjust tube feedings and TPN/PPN based on assessed needs  - Assess need for intravenous fluids  - Provide specific nutrition/hydration education as appropriate  - Include patient/family/caregiver in decisions related to nutrition  9/26/2019 1030 by Alessandra Oakley RN  Outcome: Adequate for Discharge  9/26/2019 0827 by Alessandra Oakley RN  Outcome: Progressing     Problem: METABOLIC, FLUID AND ELECTROLYTES - ADULT  Goal: Electrolytes maintained within normal limits  Description  INTERVENTIONS:  - Monitor labs and assess patient for signs and symptoms of electrolyte imbalances  - Administer electrolyte replacement as ordered  - Monitor response to electrolyte replacements, including repeat lab results as appropriate  - Instruct patient on fluid and nutrition as appropriate  9/26/2019 1030 by Alessandra Oakley RN  Outcome: Adequate for Discharge  9/26/2019 0827 by Alessandra Oakley RN  Outcome: Progressing

## 2019-09-26 NOTE — PLAN OF CARE
Problem: PAIN - ADULT  Goal: Verbalizes/displays adequate comfort level or baseline comfort level  Description  Interventions:  - Encourage patient to monitor pain and request assistance  - Assess pain using appropriate pain scale  - Administer analgesics based on type and severity of pain and evaluate response  - Implement non-pharmacological measures as appropriate and evaluate response  - Consider cultural and social influences on pain and pain management  - Notify physician/advanced practitioner if interventions unsuccessful or patient reports new pain  Outcome: Progressing     Problem: INFECTION - ADULT  Goal: Absence or prevention of progression during hospitalization  Description  INTERVENTIONS:  - Assess and monitor for signs and symptoms of infection  - Monitor lab/diagnostic results  - Monitor all insertion sites, i e  indwelling lines, tubes, and drains  - Monitor endotracheal if appropriate and nasal secretions for changes in amount and color  - [place] appropriate cooling/warming therapies per order  - Administer medications as ordered  - Instruct and encourage patient and family to use good hand hygiene technique  - Identify and instruct in appropriate isolation precautions for identified infection/condition  Outcome: Progressing     Problem: SAFETY ADULT  Goal: Patient will remain free of falls  Description  INTERVENTIONS:  - Assess patient frequently for physical needs  -  Identify cognitive and physical deficits and behaviors that affect risk of falls    -  [place] fall precautions as indicated by assessment   - Educate patient/family on patient safety including physical limitations  - Instruct patient to call for assistance with activity based on assessment  - Modify environment to reduce risk of injury  - Consider OT/PT consult to assist with strengthening/mobility  Outcome: Progressing  Goal: Maintain or return to baseline ADL function  Description  INTERVENTIONS:  -  Assess patient's ability to carry out ADLs; assess patient's baseline for ADL function and identify physical deficits which impact ability to perform ADLs (bathing, care of mouth/teeth, toileting, grooming, dressing, etc )  - Assess/evaluate cause of self-care deficits   - Assess range of motion  - Assess patient's mobility; develop plan if impaired  - Assess patient's need for assistive devices and provide as appropriate  - Encourage maximum independence but intervene and supervise when necessary  - Involve family in performance of ADLs  - Assess for home care needs following discharge   - Consider OT consult to assist with ADL evaluation and planning for discharge  - Provide patient education as appropriate  Outcome: Progressing  Goal: Maintain or return mobility status to optimal level  Description  INTERVENTIONS:  - Assess patient's baseline mobility status (ambulation, transfers, stairs, etc )    - Identify cognitive and physical deficits and behaviors that affect mobility  - Identify mobility aids required to assist with transfers and/or ambulation (gait belt, sit-to-stand, lift, walker, cane, etc )  - [place] fall precautions as indicated by assessment  - Record patient progress and toleration of activity level on Mobility SBAR; progress patient to next Phase/Stage  - Instruct patient to call for assistance with activity based on assessment  - Consider rehabilitation consult to assist with strengthening/weightbearing, etc   Outcome: Progressing     Problem: DISCHARGE PLANNING  Goal: Discharge to home or other facility with appropriate resources  Description  INTERVENTIONS:  - Identify barriers to discharge w/patient and caregiver  - Arrange for needed discharge resources and transportation as appropriate  - Identify discharge learning needs (meds, wound care, etc )  - Arrange for interpretive services to assist at discharge as needed  - Refer to Case Management Department for coordinating discharge planning if the patient needs post-hospital services based on physician/advanced practitioner order or complex needs related to functional status, cognitive ability, or social support system  Outcome: Progressing     Problem: Knowledge Deficit  Goal: Patient/family/caregiver demonstrates understanding of disease process, treatment plan, medications, and discharge instructions  Description  Complete learning assessment and assess knowledge base  Interventions:  - Provide teaching at level of understanding  - Provide teaching via preferred learning methods  Outcome: Progressing     Problem: Nutrition/Hydration-ADULT  Goal: Nutrient/Hydration intake appropriate for improving, restoring or maintaining nutritional needs  Description  Monitor and assess patient's nutrition/hydration status for malnutrition  Collaborate with interdisciplinary team and initiate plan and interventions as ordered  Monitor patient's weight and dietary intake as ordered or per policy  Utilize nutrition screening tool and intervene as necessary  Determine patient's food preferences and provide high-protein, high-caloric foods as appropriate       INTERVENTIONS:  - Monitor oral intake, urinary output, labs, and treatment plans  - Assess nutrition and hydration status and recommend course of action  - Evaluate amount of meals eaten  - Assist patient with eating if necessary   - Allow adequate time for meals  - Recommend/ encourage appropriate diets, oral nutritional supplements, and vitamin/mineral supplements  - Order, calculate, and assess calorie counts as needed  - Recommend, monitor, and adjust tube feedings and TPN/PPN based on assessed needs  - Assess need for intravenous fluids  - Provide specific nutrition/hydration education as appropriate  - Include patient/family/caregiver in decisions related to nutrition  Outcome: Progressing     Problem: METABOLIC, FLUID AND ELECTROLYTES - ADULT  Goal: Electrolytes maintained within normal limits  Description  INTERVENTIONS:  - Monitor labs and assess patient for signs and symptoms of electrolyte imbalances  - Administer electrolyte replacement as ordered  - Monitor response to electrolyte replacements, including repeat lab results as appropriate  - Instruct patient on fluid and nutrition as appropriate  Outcome: Progressing

## 2019-09-26 NOTE — DISCHARGE INSTRUCTIONS
Percutaneous Kidney Biopsy   WHAT YOU NEED TO KNOW:   A percutaneous kidney biopsy is a procedure to remove a small sample of kidney tissue  It may also be done to check for kidney disease or cancer  DISCHARGE INSTRUCTIONS:   Follow up with your healthcare provider as directed:  Write down your questions so you remember to ask them during your visits  Wound care: The Band-Aid may be removed in 24 hours  For more information:   · National [employer] Information Clearinghouse  [place] [address] [address]  Phone: [phone]  Web Address: [email or web address]  niddk [email or web address] [email or web address]   Care after your procedure:    1  Limit your activities for 36 hours after your biopsy  2  No driving day of biopsy  3  Return to your normal diet  Flat sips of flat soda helps with mild nausea  4  Remove band-aid or dressing 24 hours after procedure  Contact Interventional Radiology at [phone]    Katherine PATIENTS: Contact Interventional Radiology at [phone]) Mihai Maxwell PATIENTS: Contact Interventional Radiology at [phone]) if:    1  Difficulty breathing, nausea or vomiting  2  You feel weak or dizzy  3  Chills or fever above 101 degrees F  You have persistent nausea or vomiting    4  You have severe pain in your abdomen or where You feel weak or dizzy  your           procedure was done  5  You have blood in your urine  You urinate small amounts or not at all  4  Develop any redness, swelling, heat, unusual drainage, heavy bruising or bleeding     from biopsy site  Atorvastatin (By mouth)   Atorvastatin (a-tor-va-STAT-in)  Treats high cholesterol and triglyceride levels  Reduces the risk of angina, stroke, heart attack, or certain heart and blood vessel problems  This medicine is a statin     Brand Name(s): Lipitor   There may be other brand names for this medicine  When This Medicine Should Not Be Used: This medicine is not right for everyone  Do not use it if you had an allergic reaction to atorvastatin, if you have active liver disease, or if you are pregnant or breastfeeding  How to Use This Medicine:   Tablet  · Take your medicine as directed  Your dose may need to be changed several times to find what works best for you  · Take this medicine at the same time each day  · Swallow the tablet whole  Do not break it  · Missed dose: Take a dose as soon as you remember  If it is less than 12 hours until your next dose, skip the missed dose and take the next dose at the regular time  Do not take 2 doses of this medicine within 12 hours  · Read and follow the patient instructions that come with this medicine  Talk to your doctor or pharmacist if you have any questions  · Store the medicine in a closed container at room temperature, away from heat, moisture, and direct light  Drugs and Foods to Avoid:   Ask your doctor or pharmacist before using any other medicine, including over-the-counter medicines, vitamins, and herbal products  · Some medicines can affect how atorvastatin works  Tell your doctor if you also use birth control pills, boceprevir, cimetidine, colchicine, cyclosporine, digoxin, niacin, rifampin, spironolactone, telaprevir, medicine to treat an infection, or medicine to treat HIV/AIDS  Warnings While Using This Medicine:   · It is not safe to take this medicine during pregnancy  It could harm an unborn baby  Tell your doctor right away if you become pregnant  · Tell your doctor if you have kidney disease, diabetes, muscle pain or weakness, thyroid problems, have recently had a stroke or TIA (transient ischemic attack), or have a history of liver disease  Tell your doctor if you drink grapefruit juice or drink alcohol regularly  · This medicine can cause muscle problems, which can lead to kidney problems    · Tell any doctor or dentist who treats you that you use this medicine  You may need to stop using it if you have surgery, have an injury, or develop serious health problems  · Your doctor will do lab tests at regular visits to check on the effects of this medicine  Keep all appointments  · Keep all medicine out of the reach of children  Never share your medicine with anyone  Possible Side Effects While Using This Medicine:   Call your doctor right away if you notice any of these side effects:  · Allergic reaction: Itching or hives, swelling in your face or hands, swelling or tingling in your mouth or throat, chest tightness, trouble breathing  · Blistering, peeling, red skin rash  · Change in how much or how often you urinate  · Dark urine or pale stools, nausea, vomiting, loss of appetite, stomach pain, yellow skin or eyes  · Fever  · Muscle pain, tenderness, or weakness  · Unusual tiredness  If you notice these less serious side effects, talk with your doctor:   · Diarrhea  · Joint pain  If you notice other side effects that you think are caused by this medicine, tell your doctor  Call your doctor for medical advice about side effects  You may report side effects to FDA at 8-703-FDA-1085  © 2017 [address]  Information is for End User's use only and may not be sold, redistributed or otherwise used for commercial purposes  The above information is an  only  It is not intended as medical advice for individual conditions or treatments  Talk to your doctor, nurse or pharmacist before following any medical regimen to see if it is safe and effective for you  Torsemide (By mouth)   Torsemide (TORE-se-mide)  Treats fluid retention (edema) caused by heart failure, kidney disease, or liver disease  Also treats high blood pressure  This medicine is a diuretic (water pill)  Brand Name(s): Demadex   There may be other brand names for this medicine  When This Medicine Should Not Be Used:    This medicine is not right for everyone  Do not use it if you had an allergic reaction to torsemide or povidone  How to Use This Medicine:   Tablet  · Take your medicine as directed  Your dose may need to be changed several times to find what works best for you  · Carefully follow your doctor's instructions about any special diet  You may need to eat foods that are high in potassium (such as oranges or bananas) to prevent potassium loss while you are using this medicine  · Missed food: Take a dose as soon as you remember  If it is almost time for your next dose, wait until then and take a regular dose  Do not take extra medicine to make up for a missed dose  · Store the medicine in a closed container at room temperature, away from heat, moisture, and direct light  Drugs and Foods to Avoid:   Ask your doctor or pharmacist before using any other medicine, including over-the-counter medicines, vitamins, and herbal products  · Some medicines can affect how torsemide works  Tell your doctor if you are using any of the following:  ¨ Amiodarone, cisplatin, digoxin, ethacrynic acid, fluconazole, lithium, miconazole, oxandrolone, phenytoin, probenecid, rifampin, or warfarin  ¨ Aminoglycoside antibiotic (including amikacin, gentamicin, streptomycin)  ¨ Blood pressure medicine  ¨ NSAID pain or arthritis medicine (including aspirin, celecoxib, diclofenac, ibuprofen, indomethacin, naproxen)    · If you are taking cholestyramine, take it at least 1 hour after or 4 to 6 hours before taking torsemide  Warnings While Using This Medicine:   · Tell your doctor if you are pregnant or breastfeeding, or if you have kidney disease, liver disease, dehydration, diabetes, gout, heart disease, hearing problems, or had an allergic reaction to any sulfa drugs  Tell your doctor if you are on a low-salt diet  · This medicine may make you dizzy or drowsy  Do not drive or do anything else that could be dangerous until you know how this medicine affects you  Sit or lie down if you feel dizzy  Stand up carefully  · Make sure any doctor or dentist who treats you knows that you are using this medicine  · Your doctor will do lab tests at regular visits to check on the effects of this medicine  Keep all appointments  · Keep all medicine out of the reach of children  Never share your medicine with anyone  Possible Side Effects While Using This Medicine:   Call your doctor right away if you notice any of these side effects:  · Allergic reaction: Itching or hives, swelling in your face or hands, swelling or tingling in your mouth or throat, chest tightness, trouble breathing  · Blistering, peeling, red skin rash  · Chest pain, fast or uneven heartbeat  · Dry mouth, increased thirst, muscle cramps, problems urinating, nausea, or vomiting  · Increase in how much or how often you urinate  · Lightheadedness, dizziness, fainting  · Hearing loss, or ringing in your ears  If you notice other side effects that you think are caused by this medicine, tell your doctor  Call your doctor for medical advice about side effects  You may report side effects to FDA at 1-716-FDA-6650  © 2017 [address] [employer] Information is for End User's use only and may not be sold, redistributed or otherwise used for commercial purposes  The above information is an  only  It is not intended as medical advice for individual conditions or treatments  Talk to your doctor, nurse or pharmacist before following any medical regimen to see if it is safe and effective for you

## 2019-09-26 NOTE — TREATMENT PLAN
Patient was seen before discharge  Stable post renal biopsy  Follow-up with Dr Bruna Moreno on Monday 9/30  Edema has improved greatly with leg elevation  She has not returning to work until next Tuesday  Patient understands plan of care    All questions answered to the best my ability

## 2019-09-27 LAB — CRYOGLOB SER QL 1D COLD INC: NORMAL

## 2019-09-30 ENCOUNTER — OFFICE VISIT (OUTPATIENT)
Dept: NEPHROLOGY | Facility: CLINIC | Age: 44
End: 2019-09-30
Payer: COMMERCIAL

## 2019-09-30 VITALS
WEIGHT: 134 LBS | DIASTOLIC BLOOD PRESSURE: 70 MMHG | BODY MASS INDEX: 22.88 KG/M2 | SYSTOLIC BLOOD PRESSURE: 105 MMHG | HEIGHT: 64 IN

## 2019-09-30 DIAGNOSIS — R80.9 PROTEINURIA, UNSPECIFIED TYPE: ICD-10-CM

## 2019-09-30 DIAGNOSIS — E88.09 HYPOALBUMINEMIA: Primary | ICD-10-CM

## 2019-09-30 DIAGNOSIS — R60.0 BILATERAL EDEMA OF LOWER EXTREMITY: ICD-10-CM

## 2019-09-30 LAB — MISCELLANEOUS LAB TEST RESULT: NORMAL

## 2019-09-30 PROCEDURE — 99213 OFFICE O/P EST LOW 20 MIN: CPT | Performed by: INTERNAL MEDICINE

## 2019-09-30 NOTE — PROGRESS NOTES
NEPHROLOGY OFFICE VISIT   Ashlie Gant 45 y o  female MRN: [id number]  9/30/2019    Reason for Visit:  Hospital follow-up    ASSESSMENT and PLAN:    I had the pleasure of seeing in a cold today in the renal clinic for the continued management of proteinuria and lower extremity swelling  She was recently discharged from [facility] on September 26th after presenting for increased lower extremity edema  She was seen by the Nephrology team due to concern of from chronic syndrome  A serologic workup was unrevealing  She underwent a renal biopsy on September 25th  She was discharged on torsemide 5 mg daily  She reports no improvement of her swelling except 1 elevating her legs  Unfortunately we do not have any results of the renal biopsy  We did call [facility] who port that they have not received any sample  We called the lab at [facility] who reported that they sent it out but will investigate  1 ) Nephrotic syndrome  -urine protein creatinine ratio 7 7  -urine protein electrophoresis showed no monoclonal band  -serum protein electrophoresis showed no monoclonal band  -Kappa:Lambda 0 88  -lipid panel:  Cholesterol 320, triglycerides 86,   -albumin 0 8  -no evidence of microscopic hematuria  -ZAYNAB negative  -hepatitis panel nonreactive  -serum complements were within normal limits  -ANCA negative  -C1 esterase inhibitor slightly elevated at 44  -anti phospho lipase A2R antibody is still in process  -serum pregnancy test was negative  -we will need to follow up the results of the renal biopsy   -alternate torsemide 5 mg and 10 mg  -low 2 g sodium diet      PATIENT INSTRUCTIONS:    Patient Instructions   Alternate torsemide 5 mg in 10 mg    Check daily weights        Orders Placed This Encounter   Procedures    Comprehensive metabolic panel     This is a patient instruction: Patient fasting for 8 hours or longer recommended       Standing Status:   Future     Standing Expiration Date:   9/30/2020    Protein / creatinine ratio, urine     Standing Status:   Future     Standing Expiration Date:   9/30/2020       OBJECTIVE:  Current Weight: Weight - Scale: 60 8 kg (134 lb)  Vitals:    09/30/19 0835 09/30/19 0853   BP:  105/70   Weight: 60 8 kg (134 lb)    Height: 5' 4" (1 626 m)     Body mass index is 23 kg/m²  REVIEW OF SYSTEMS:    Review of Systems   Constitutional: Negative for activity change and fever  Respiratory: Negative for cough, chest tightness, shortness of breath and wheezing  Cardiovascular: Positive for leg swelling  Negative for chest pain  Gastrointestinal: Negative for abdominal pain, diarrhea, nausea and vomiting  Endocrine: Negative for polyuria  Genitourinary: Negative for difficulty urinating, dysuria, flank pain, frequency and urgency  Skin: Negative for rash  Neurological: Negative for dizziness, syncope, light-headedness and headaches  PHYSICAL EXAM:      Physical Exam   Constitutional: She is oriented to person, place, and time  She appears well-developed and well-nourished  No distress  HENT:   Head: Normocephalic and atraumatic  Eyes: Pupils are equal, round, and reactive to light  No scleral icterus  Neck: Normal range of motion  Neck supple  Cardiovascular: Normal rate, regular rhythm and normal heart sounds  Exam reveals no gallop and no friction rub  No murmur heard  Pulmonary/Chest: Effort normal and breath sounds normal  No respiratory distress  She has no wheezes  She has no rales  She exhibits no tenderness  Abdominal: Soft  Bowel sounds are normal  She exhibits no distension  There is no tenderness  There is no rebound  Musculoskeletal: Normal range of motion  She exhibits no edema  Neurological: She is alert and oriented to person, place, and time  Skin: No rash noted  She is not diaphoretic  Psychiatric: She has a normal mood and affect     Nursing note and vitals reviewed        Medications:    Current Outpatient Medications:     atorvastatin (LIPITOR) 20 mg tablet, Take 1 tablet (20 mg total) by mouth daily with dinner, Disp: 30 tablet, Rfl: 0    torsemide (DEMADEX) 5 MG tablet, Take 1 tablet (5 mg total) by mouth daily, Disp: 30 tablet, Rfl: 0    Laboratory Results:  Results from last 7 days   Lab Units 09/26/19  0449 09/25/19  0507 09/24/19  0605   WBC Thousand/uL 7 37 6 77 7 90   HEMOGLOBIN g/dL 11 8 12 6 12 1   HEMATOCRIT % 36 5 38 5 37 1   PLATELETS Thousands/uL 286 291 292   POTASSIUM mmol/L 3 9 3 8 4 1   CHLORIDE mmol/L 107 110* 110*   CO2 mmol/L 29 29 26   BUN mg/dL 12 14 15   CREATININE mg/dL 0 57* 0 61 0 60   CALCIUM mg/dL 7 1* 7 7* 7 5*   MAGNESIUM mg/dL  --  1 9  --        Results for orders placed or performed during the hospital encounter of 09/22/19   CBC and differential   Result Value Ref Range    WBC 9 52 4 31 - 10 16 Thousand/uL    RBC 4 45 3 81 - 5 12 Million/uL    Hemoglobin 13 3 11 5 - 15 4 g/dL    Hematocrit 41 1 34 8 - 46 1 %    MCV 92 82 - 98 fL    MCH 29 9 26 8 - 34 3 pg    MCHC 32 4 31 4 - 37 4 g/dL    RDW 12 5 11 6 - 15 1 %    MPV 8 7 (L) 8 9 - 12 7 fL    Platelets 426 010 - 201 Thousands/uL    nRBC 0 /100 WBCs    Neutrophils Relative 68 43 - 75 %    Immat GRANS % 0 0 - 2 %    Lymphocytes Relative 17 14 - 44 %    Monocytes Relative 8 4 - 12 %    Eosinophils Relative 6 0 - 6 %    Basophils Relative 1 0 - 1 %    Neutrophils Absolute 6 48 1 85 - 7 62 Thousands/µL    Immature Grans Absolute 0 02 0 00 - 0 20 Thousand/uL    Lymphocytes Absolute 1 58 0 60 - 4 47 Thousands/µL    Monocytes Absolute 0 78 0 17 - 1 22 Thousand/µL    Eosinophils Absolute 0 57 0 00 - 0 61 Thousand/µL    Basophils Absolute 0 09 0 00 - 0 10 Thousands/µL   Comprehensive metabolic panel   Result Value Ref Range    Sodium 143 136 - 145 mmol/L    Potassium 3 9 3 5 - 5 3 mmol/L    Chloride 110 (H) 100 - 108 mmol/L    CO2 30 21 - 32 mmol/L    ANION GAP 3 (L) 4 - 13 mmol/L    BUN 9 5 - 25 mg/dL    Creatinine 0 51 (L) 0 60 - 1 30 mg/dL    Glucose 85 65 - 140 mg/dL    Calcium 7 2 (L) 8 3 - 10 1 mg/dL    AST 37 5 - 45 U/L    ALT 34 12 - 78 U/L    Alkaline Phosphatase 55 46 - 116 U/L    Total Protein 4 7 (L) 6 4 - 8 2 g/dL    Albumin 0 9 (L) 3 5 - 5 0 g/dL    Total Bilirubin 0 40 0 20 - 1 00 mg/dL    eGFR 119 ml/min/1 73sq m   B-type natriuretic peptide   Result Value Ref Range    NT-proBNP 214 (H) <125 pg/mL   Urine Microscopic   Result Value Ref Range    RBC, UA None Seen None Seen, 0-5 /hpf    WBC, UA None Seen None Seen, 0-5, 5-55, 5-65 /hpf    Epithelial Cells Occasional None Seen, Occasional /hpf    Bacteria, UA Occasional None Seen, Occasional /hpf    MUCUS THREADS Occasional (A) None Seen   Comprehensive metabolic panel   Result Value Ref Range    Sodium 141 136 - 145 mmol/L    Potassium 3 8 3 5 - 5 3 mmol/L    Chloride 111 (H) 100 - 108 mmol/L    CO2 27 21 - 32 mmol/L    ANION GAP 3 (L) 4 - 13 mmol/L    BUN 14 5 - 25 mg/dL    Creatinine 0 54 (L) 0 60 - 1 30 mg/dL    Glucose 92 65 - 140 mg/dL    Glucose, Fasting 92 65 - 99 mg/dL    Calcium 7 5 (L) 8 3 - 10 1 mg/dL    AST 25 5 - 45 U/L    ALT 27 12 - 78 U/L    Alkaline Phosphatase 48 46 - 116 U/L    Total Protein 4 2 (L) 6 4 - 8 2 g/dL    Albumin 0 7 (L) 3 5 - 5 0 g/dL    Total Bilirubin 0 30 0 20 - 1 00 mg/dL    eGFR 117 ml/min/1 73sq m   CBC and differential   Result Value Ref Range    WBC 10 33 (H) 4 31 - 10 16 Thousand/uL    RBC 4 14 3 81 - 5 12 Million/uL    Hemoglobin 12 5 11 5 - 15 4 g/dL    Hematocrit 37 9 34 8 - 46 1 %    MCV 92 82 - 98 fL    MCH 30 2 26 8 - 34 3 pg    MCHC 33 0 31 4 - 37 4 g/dL    RDW 12 5 11 6 - 15 1 %    MPV 8 7 (L) 8 9 - 12 7 fL    Platelets 638 960 - 726 Thousands/uL    nRBC 0 /100 WBCs    Neutrophils Relative 66 43 - 75 %    Immat GRANS % 1 0 - 2 %    Lymphocytes Relative 20 14 - 44 %    Monocytes Relative 10 4 - 12 %    Eosinophils Relative 2 0 - 6 %    Basophils Relative 1 0 - 1 %    Neutrophils Absolute 6 94 1 85 - 7 62 Thousands/µL    Immature Grans Absolute 0 05 0 00 - 0 20 Thousand/uL    Lymphocytes Absolute 2 10 0 60 - 4 47 Thousands/µL    Monocytes Absolute 1 00 0 17 - 1 22 Thousand/µL    Eosinophils Absolute 0 17 0 00 - 0 61 Thousand/µL    Basophils Absolute 0 07 0 00 - 0 10 Thousands/µL   Lipid panel   Result Value Ref Range    Cholesterol 320 (H) 50 - 200 mg/dL    Triglycerides 86 <=150 mg/dL    HDL, Direct 93 (H) 40 - 60 mg/dL    LDL Calculated 210 (H) 0 - 100 mg/dL    Non-HDL-Chol (CHOL-HDL) 227 mg/dl   ZAYNAB Screen w/ Reflex to Titer/Pattern   Result Value Ref Range    ZAYNAB Negative Negative   Anti-neutrophilic cytoplasmic antibody   Result Value Ref Range    C-ANCA <1:20 Neg:<1:20 titer    Atypical pANCA <1:20 Neg:<1:20 titer    MPO AB <9 0 0 0 - 9 0 U/mL    HI-3 AB <3 5 0 0 - 3 5 U/mL    P-ANCA <1:20 Neg:<1:20 titer   C4 complement   Result Value Ref Range    C4, COMPLEMENT 30 0 10 0 - 40 0 mg/dL   C3 complement   Result Value Ref Range    C3 Complement 127 0 90 0 - 180 0 mg/dL   Chronic Hepatitis Panel   Result Value Ref Range    Hepatitis B Surface Ag Non-reactive Non-reactive, NonReactive - Confirmed    Hepatitis C Ab Non-reactive Non-reactive    Hep B C IgM Non-reactive Non-reactive    Hep B Core Total Ab Non-reactive Non-reactive   Cryoglobulin   Result Value Ref Range    Cryoglobulin Comment None detected   Immunoglobulin free LT chains blood   Result Value Ref Range    Ig Kappa Free Light Chain 25 0 (H) 3 3 - 19 4 mg/L    Ig Lambda Free Light Chain 28 3 (H) 5 7 - 26 3 mg/L    Kappa/Lambda FluidC Ratio 0 88 0 26 - 1 65   Protein electrophoresis, serum   Result Value Ref Range    A/G Ratio 0 66 (L) 1 10 - 1 80    Albumin Electrophoresis 39 7 (L) 52 0 - 65 0 %    Albumin CONC 1 67 (L) 3 50 - 5 00 g/dl    Alpha 1 4 6 2 5 - 5 0 %    ALPHA 1 CONC 0 19 0 10 - 0 40 g/dL    Alpha 2 27 2 (H) 7 0 - 13 0 %    ALPHA 2 CONC 1 14 0 40 - 1 20 g/dL    Beta-1 4 6 (L) 5 0 - 13 0 %    BETA 1 CONC 0 19 (L) 0 40 - 0 80 g/dL Beta-2 10 1 (H) 2 0 - 8 0 %    BETA 2 CONC 0 42 0 20 - 0 50 g/dL    Gamma Globulin 13 8 12 0 - 22 0 %    GAMMA CONC 0 58 0 50 - 1 60 g/dL    Total Protein 4 2 (L) 6 4 - 8 2 g/dL    SPEP Interpretation       No monoclonal bands noted  Reviewed by: Dixie Belcher MD, PhD ([id number]) **Electronic Signature**   APTT   Result Value Ref Range    PTT 29 23 - 37 seconds   Protime-INR   Result Value Ref Range    Protime 12 7 11 6 - 14 5 seconds    INR 1 01 0 84 - 1 19   Vitamin D 25 hydroxy   Result Value Ref Range    Vit D, 25-Hydroxy 12 5 (L) 30 0 - 100 0 ng/mL   C1 esterase inhibitor   Result Value Ref Range    C1 Esterase Inhibitor 44 (H) 21 - 39 mg/dL   Protein electrophoresis, urine   Result Value Ref Range    Urine Protein 613 0 (H) 2 0 - 17 5 mg/dL    Albumin ELP, Urine 80 0 %    Alpha 1, Urine 7 5 %    Alpha 2, Urine 3 1 %    Beta, Urine 5 7 %    Gamma Globulin, Urine 3 7 %    UPEP Interp       The UPEP shows non-selective proteinuria   No monoclonal bands noted  Reviewed by: Dixie Belcher MD, PhD ([id number])  **Electronic Signature**   Comprehensive metabolic panel   Result Value Ref Range    Sodium 140 136 - 145 mmol/L    Potassium 4 1 3 5 - 5 3 mmol/L    Chloride 110 (H) 100 - 108 mmol/L    CO2 26 21 - 32 mmol/L    ANION GAP 4 4 - 13 mmol/L    BUN 15 5 - 25 mg/dL    Creatinine 0 60 0 60 - 1 30 mg/dL    Glucose 88 65 - 140 mg/dL    Calcium 7 5 (L) 8 3 - 10 1 mg/dL    AST 30 5 - 45 U/L    ALT 29 12 - 78 U/L    Alkaline Phosphatase 45 (L) 46 - 116 U/L    Total Protein 4 2 (L) 6 4 - 8 2 g/dL    Albumin 0 8 (L) 3 5 - 5 0 g/dL    Total Bilirubin 0 30 0 20 - 1 00 mg/dL    eGFR 113 ml/min/1 73sq m   CBC   Result Value Ref Range    WBC 7 90 4 31 - 10 16 Thousand/uL    RBC 4 04 3 81 - 5 12 Million/uL    Hemoglobin 12 1 11 5 - 15 4 g/dL    Hematocrit 37 1 34 8 - 46 1 %    MCV 92 82 - 98 fL    MCH 30 0 26 8 - 34 3 pg    MCHC 32 6 31 4 - 37 4 g/dL    RDW 12 7 11 6 - 15 1 %    Platelets 342 312 - 627 Thousands/uL    MPV 8 8 (L) 8 9 - 12 7 fL   Iron Saturation %   Result Value Ref Range    Iron Saturation 40 %    TIBC 119 (L) 250 - 450 ug/dL    Iron 48 (L) 50 - 170 ug/dL   Ferritin   Result Value Ref Range    Ferritin 57 8 - 388 ng/mL   Pregnancy Test (HCG Qualitative)   Result Value Ref Range    Preg, Serum Negative Negative   Basic metabolic panel   Result Value Ref Range    Sodium 141 136 - 145 mmol/L    Potassium 3 8 3 5 - 5 3 mmol/L    Chloride 110 (H) 100 - 108 mmol/L    CO2 29 21 - 32 mmol/L    ANION GAP 2 (L) 4 - 13 mmol/L    BUN 14 5 - 25 mg/dL    Creatinine 0 61 0 60 - 1 30 mg/dL    Glucose 90 65 - 140 mg/dL    Calcium 7 7 (L) 8 3 - 10 1 mg/dL    eGFR 112 ml/min/1 73sq m   Magnesium   Result Value Ref Range    Magnesium 1 9 1 6 - 2 6 mg/dL   CBC and differential   Result Value Ref Range    WBC 6 77 4 31 - 10 16 Thousand/uL    RBC 4 22 3 81 - 5 12 Million/uL    Hemoglobin 12 6 11 5 - 15 4 g/dL    Hematocrit 38 5 34 8 - 46 1 %    MCV 91 82 - 98 fL    MCH 29 9 26 8 - 34 3 pg    MCHC 32 7 31 4 - 37 4 g/dL    RDW 12 5 11 6 - 15 1 %    MPV 8 6 (L) 8 9 - 12 7 fL    Platelets 933 647 - 687 Thousands/uL    nRBC 0 /100 WBCs    Neutrophils Relative 36 (L) 43 - 75 %    Immat GRANS % 0 0 - 2 %    Lymphocytes Relative 44 14 - 44 %    Monocytes Relative 9 4 - 12 %    Eosinophils Relative 10 (H) 0 - 6 %    Basophils Relative 1 0 - 1 %    Neutrophils Absolute 2 43 1 85 - 7 62 Thousands/µL    Immature Grans Absolute 0 02 0 00 - 0 20 Thousand/uL    Lymphocytes Absolute 2 99 0 60 - 4 47 Thousands/µL    Monocytes Absolute 0 60 0 17 - 1 22 Thousand/µL    Eosinophils Absolute 0 65 (H) 0 00 - 0 61 Thousand/µL    Basophils Absolute 0 08 0 00 - 0 10 Thousands/µL   CBC and differential   Result Value Ref Range    WBC 7 37 4 31 - 10 16 Thousand/uL    RBC 3 98 3 81 - 5 12 Million/uL    Hemoglobin 11 8 11 5 - 15 4 g/dL    Hematocrit 36 5 34 8 - 46 1 %    MCV 92 82 - 98 fL    MCH 29 6 26 8 - 34 3 pg    MCHC 32 3 31 4 - 37 4 g/dL    RDW 12 2 11 6 - 15 1 %    MPV 8 5 (L) 8 9 - 12 7 fL    Platelets 429 642 - 093 Thousands/uL    nRBC 0 /100 WBCs    Neutrophils Relative 46 43 - 75 %    Immat GRANS % 0 0 - 2 %    Lymphocytes Relative 38 14 - 44 %    Monocytes Relative 8 4 - 12 %    Eosinophils Relative 7 (H) 0 - 6 %    Basophils Relative 1 0 - 1 %    Neutrophils Absolute 3 41 1 85 - 7 62 Thousands/µL    Immature Grans Absolute 0 01 0 00 - 0 20 Thousand/uL    Lymphocytes Absolute 2 80 0 60 - 4 47 Thousands/µL    Monocytes Absolute 0 57 0 17 - 1 22 Thousand/µL    Eosinophils Absolute 0 51 0 00 - 0 61 Thousand/µL    Basophils Absolute 0 07 0 00 - 0 10 Thousands/µL   Basic metabolic panel   Result Value Ref Range    Sodium 138 136 - 145 mmol/L    Potassium 3 9 3 5 - 5 3 mmol/L    Chloride 107 100 - 108 mmol/L    CO2 29 21 - 32 mmol/L    ANION GAP 2 (L) 4 - 13 mmol/L    BUN 12 5 - 25 mg/dL    Creatinine 0 57 (L) 0 60 - 1 30 mg/dL    Glucose 86 65 - 140 mg/dL    Calcium 7 1 (L) 8 3 - 10 1 mg/dL    eGFR 115 ml/min/1 73sq m   Tissue Exam   Result Value Ref Range    Case Report       Surgical Pathology Report                         Case: [id number]                                   Authorizing Provider:  Derrick Cobb MD             Collected:           09/25/2019 1518              Ordering Location:     [facility]        Received:            09/25/2019 [address] 2nd Floor Med                                                                              Surg Unit                                                                    Pathologist:           Laura Ray MD                                                             Specimen:    Kidney, Left                                                                               Final Diagnosis       A  Kidney, Left, needle core biopsy:  -  Renal cortical parenchyma, gross evaluation only    -  The entire specimen, including tissue for electron microscopy, light microscopy and immunofluorescence is sent to [facility] for expert      nephropathologist evaluation   -  A full report from that institution will follow under separate cover  Interpretation performed at [facility], [address] [place] [id number]  Additional Information       All controls performed with the immunohistochemical stains reported above reacted appropriately  These tests were developed and their performance characteristics determined by [address] [place] [address] [facility] or [facility]  They may not be cleared or approved by the [place]  Food and Drug Administration  The FDA has determined that such clearance or approval is not necessary  These tests are used for clinical purposes  They should not be regarded as investigational or for research  This laboratory has been approved by [place] [id number], designated as a high-complexity laboratory and is qualified to perform these tests  Intraoperative Consultation       Gross Microscopic Examination: Glomeruli present  Dr Rodrigo Brasher present @ diagnosis 9/25/19, 1540 hours  *Electronic SignatureYEVGENIY Moreland  Note: 4 cores placed in formalin prior to assessment/removed and evaluated with glomeruli seen  Additional fresh cores for EM/IF obtained  EMM      Gross Description       A  The specimen is received in formalin and fresh, labeled with the patient's name and hospital number, and is designated "left kidney"  The specimen consists of 6 tan-red cores of soft tissue each measuring from 1 0-1 5 cm in length  Each is 0 1 cm in diameter  The specimen is sent out for electron microscopy, light microscopy and immunofluorescence to:    [facility]  Dept of Pathology  [address]    Gross examination only  No sections submitted   EMM      Clinical Information ISA    ED Urine Macroscopic   Result Value Ref Range    Color, UA Yellow     Clarity, UA Clear     pH, UA 8 5 (H) 4 5 - 8 0    Leukocytes, UA Negative Negative    Nitrite, UA Negative Negative    Protein, UA >=300 (A) Negative mg/dl    Glucose, UA Negative Negative mg/dl    Ketones, UA Negative Negative mg/dl    Urobilinogen, UA 0 2 0 2, 1 0 E U /dl E U /dl    Bilirubin, UA Negative Negative    Blood, UA Negative Negative    Specific Gravity, UA 1 025 1 003 - 1 030

## 2019-09-30 NOTE — LETTER
September 30, 2019     Prakash Clay, [address] [facility]    Patient: Adina Rangel   YOB: 1975   Date of Visit: 9/30/2019       Dear Dr Orlando Jackson:    Thank you for referring Hemal Iqbal to me for evaluation  Below are my notes for this consultation  If you have questions, please do not hesitate to call me  I look forward to following your patient along with you  Sincerely,        Mariama Ramos MD        CC: MD Tanisha Mendoza Ames Necessary, MD  9/30/2019  8:53 AM  Incomplete  NEPHROLOGY OFFICE VISIT   Robert Castleman Guidon 45 y o  female MRN: [id number]  9/30/2019    Reason for Visit:  Hospital follow-up    ASSESSMENT and PLAN:    I had the pleasure of seeing in a cold today in the renal clinic for the continued management of proteinuria and lower extremity swelling  She was recently discharged from [facility] on September 26th after presenting for increased lower extremity edema  She was seen by the Nephrology team due to concern of from chronic syndrome  A serologic workup was unrevealing  She underwent a renal biopsy on September 25th  She was discharged on torsemide 5 mg daily  She reports no improvement of her swelling except 1 elevating her legs  Unfortunately we do not have any results of the renal biopsy  We did call Vanessa who port that they have not received any sample  We called the lab at [facility] who reported that they sent it out but will investigate      1 ) Nephrotic syndrome  -urine protein creatinine ratio 7 7  -urine protein electrophoresis showed no monoclonal band  -serum protein electrophoresis showed no monoclonal band  -Kappa:Lambda 0 88  -lipid panel:  Cholesterol 320, triglycerides 86,   -albumin 0 8  -no evidence of microscopic hematuria  -ZAYNAB negative  -hepatitis panel nonreactive  -serum complements were within normal limits  -ANCA negative  -C1 esterase inhibitor slightly elevated at 44  -anti phospho lipase A2R antibody is still in process  -serum pregnancy test was negative  -we will need to follow up the results of the renal biopsy   -alternate torsemide 5 mg and 10 mg  -low 2 g sodium diet      PATIENT INSTRUCTIONS:    Patient Instructions   Alternate torsemide 5 mg in 10 mg    Check daily weights        Orders Placed This Encounter   Procedures    Comprehensive metabolic panel     This is a patient instruction: Patient fasting for 8 hours or longer recommended  Standing Status:   Future     Standing Expiration Date:   9/30/2020    Protein / creatinine ratio, urine     Standing Status:   Future     Standing Expiration Date:   9/30/2020       OBJECTIVE:  Current Weight: Weight - Scale: 60 8 kg (134 lb)  Vitals:    09/30/19 0835 09/30/19 0853   BP:  105/70   Weight: 60 8 kg (134 lb)    Height: 5' 4" (1 626 m)     Body mass index is 23 kg/m²  REVIEW OF SYSTEMS:    Review of Systems   Constitutional: Negative for activity change and fever  Respiratory: Negative for cough, chest tightness, shortness of breath and wheezing  Cardiovascular: Positive for leg swelling  Negative for chest pain  Gastrointestinal: Negative for abdominal pain, diarrhea, nausea and vomiting  Endocrine: Negative for polyuria  Genitourinary: Negative for difficulty urinating, dysuria, flank pain, frequency and urgency  Skin: Negative for rash  Neurological: Negative for dizziness, syncope, light-headedness and headaches  PHYSICAL EXAM:      Physical Exam   Constitutional: She is oriented to person, place, and time  She appears well-developed and well-nourished  No distress  HENT:   Head: Normocephalic and atraumatic  Eyes: Pupils are equal, round, and reactive to light  No scleral icterus  Neck: Normal range of motion  Neck supple  Cardiovascular: Normal rate, regular rhythm and normal heart sounds  Exam reveals no gallop and no friction rub     No murmur heard   Pulmonary/Chest: Effort normal and breath sounds normal  No respiratory distress  She has no wheezes  She has no rales  She exhibits no tenderness  Abdominal: Soft  Bowel sounds are normal  She exhibits no distension  There is no tenderness  There is no rebound  Musculoskeletal: Normal range of motion  She exhibits no edema  Neurological: She is alert and oriented to person, place, and time  Skin: No rash noted  She is not diaphoretic  Psychiatric: She has a normal mood and affect  Nursing note and vitals reviewed        Medications:    Current Outpatient Medications:     atorvastatin (LIPITOR) 20 mg tablet, Take 1 tablet (20 mg total) by mouth daily with dinner, Disp: 30 tablet, Rfl: 0    torsemide (DEMADEX) 5 MG tablet, Take 1 tablet (5 mg total) by mouth daily, Disp: 30 tablet, Rfl: 0    Laboratory Results:  Results from last 7 days   Lab Units 09/26/19  0449 09/25/19  0507 09/24/19  0605   WBC Thousand/uL 7 37 6 77 7 90   HEMOGLOBIN g/dL 11 8 12 6 12 1   HEMATOCRIT % 36 5 38 5 37 1   PLATELETS Thousands/uL 286 291 292   POTASSIUM mmol/L 3 9 3 8 4 1   CHLORIDE mmol/L 107 110* 110*   CO2 mmol/L 29 29 26   BUN mg/dL 12 14 15   CREATININE mg/dL 0 57* 0 61 0 60   CALCIUM mg/dL 7 1* 7 7* 7 5*   MAGNESIUM mg/dL  --  1 9  --        Results for orders placed or performed during the hospital encounter of 09/22/19   CBC and differential   Result Value Ref Range    WBC 9 52 4 31 - 10 16 Thousand/uL    RBC 4 45 3 81 - 5 12 Million/uL    Hemoglobin 13 3 11 5 - 15 4 g/dL    Hematocrit 41 1 34 8 - 46 1 %    MCV 92 82 - 98 fL    MCH 29 9 26 8 - 34 3 pg    MCHC 32 4 31 4 - 37 4 g/dL    RDW 12 5 11 6 - 15 1 %    MPV 8 7 (L) 8 9 - 12 7 fL    Platelets 790 364 - 491 Thousands/uL    nRBC 0 /100 WBCs    Neutrophils Relative 68 43 - 75 %    Immat GRANS % 0 0 - 2 %    Lymphocytes Relative 17 14 - 44 %    Monocytes Relative 8 4 - 12 %    Eosinophils Relative 6 0 - 6 %    Basophils Relative 1 0 - 1 %    Neutrophils Absolute 6 48 1 85 - 7 62 Thousands/µL    Immature Grans Absolute 0 02 0 00 - 0 20 Thousand/uL    Lymphocytes Absolute 1 58 0 60 - 4 47 Thousands/µL    Monocytes Absolute 0 78 0 17 - 1 22 Thousand/µL    Eosinophils Absolute 0 57 0 00 - 0 61 Thousand/µL    Basophils Absolute 0 09 0 00 - 0 10 Thousands/µL   Comprehensive metabolic panel   Result Value Ref Range    Sodium 143 136 - 145 mmol/L    Potassium 3 9 3 5 - 5 3 mmol/L    Chloride 110 (H) 100 - 108 mmol/L    CO2 30 21 - 32 mmol/L    ANION GAP 3 (L) 4 - 13 mmol/L    BUN 9 5 - 25 mg/dL    Creatinine 0 51 (L) 0 60 - 1 30 mg/dL    Glucose 85 65 - 140 mg/dL    Calcium 7 2 (L) 8 3 - 10 1 mg/dL    AST 37 5 - 45 U/L    ALT 34 12 - 78 U/L    Alkaline Phosphatase 55 46 - 116 U/L    Total Protein 4 7 (L) 6 4 - 8 2 g/dL    Albumin 0 9 (L) 3 5 - 5 0 g/dL    Total Bilirubin 0 40 0 20 - 1 00 mg/dL    eGFR 119 ml/min/1 73sq m   B-type natriuretic peptide   Result Value Ref Range    NT-proBNP 214 (H) <125 pg/mL   Urine Microscopic   Result Value Ref Range    RBC, UA None Seen None Seen, 0-5 /hpf    WBC, UA None Seen None Seen, 0-5, 5-55, 5-65 /hpf    Epithelial Cells Occasional None Seen, Occasional /hpf    Bacteria, UA Occasional None Seen, Occasional /hpf    MUCUS THREADS Occasional (A) None Seen   Comprehensive metabolic panel   Result Value Ref Range    Sodium 141 136 - 145 mmol/L    Potassium 3 8 3 5 - 5 3 mmol/L    Chloride 111 (H) 100 - 108 mmol/L    CO2 27 21 - 32 mmol/L    ANION GAP 3 (L) 4 - 13 mmol/L    BUN 14 5 - 25 mg/dL    Creatinine 0 54 (L) 0 60 - 1 30 mg/dL    Glucose 92 65 - 140 mg/dL    Glucose, Fasting 92 65 - 99 mg/dL    Calcium 7 5 (L) 8 3 - 10 1 mg/dL    AST 25 5 - 45 U/L    ALT 27 12 - 78 U/L    Alkaline Phosphatase 48 46 - 116 U/L    Total Protein 4 2 (L) 6 4 - 8 2 g/dL    Albumin 0 7 (L) 3 5 - 5 0 g/dL    Total Bilirubin 0 30 0 20 - 1 00 mg/dL    eGFR 117 ml/min/1 73sq m   CBC and differential   Result Value Ref Range    WBC 10 33 (H) 4 31 - 10 16 Thousand/uL    RBC 4 14 3 81 - 5 12 Million/uL    Hemoglobin 12 5 11 5 - 15 4 g/dL    Hematocrit 37 9 34 8 - 46 1 %    MCV 92 82 - 98 fL    MCH 30 2 26 8 - 34 3 pg    MCHC 33 0 31 4 - 37 4 g/dL    RDW 12 5 11 6 - 15 1 %    MPV 8 7 (L) 8 9 - 12 7 fL    Platelets 804 669 - 616 Thousands/uL    nRBC 0 /100 WBCs    Neutrophils Relative 66 43 - 75 %    Immat GRANS % 1 0 - 2 %    Lymphocytes Relative 20 14 - 44 %    Monocytes Relative 10 4 - 12 %    Eosinophils Relative 2 0 - 6 %    Basophils Relative 1 0 - 1 %    Neutrophils Absolute 6 94 1 85 - 7 62 Thousands/µL    Immature Grans Absolute 0 05 0 00 - 0 20 Thousand/uL    Lymphocytes Absolute 2 10 0 60 - 4 47 Thousands/µL    Monocytes Absolute 1 00 0 17 - 1 22 Thousand/µL    Eosinophils Absolute 0 17 0 00 - 0 61 Thousand/µL    Basophils Absolute 0 07 0 00 - 0 10 Thousands/µL   Lipid panel   Result Value Ref Range    Cholesterol 320 (H) 50 - 200 mg/dL    Triglycerides 86 <=150 mg/dL    HDL, Direct 93 (H) 40 - 60 mg/dL    LDL Calculated 210 (H) 0 - 100 mg/dL    Non-HDL-Chol (CHOL-HDL) 227 mg/dl   ZAYNAB Screen w/ Reflex to Titer/Pattern   Result Value Ref Range    ZAYNAB Negative Negative   Anti-neutrophilic cytoplasmic antibody   Result Value Ref Range    C-ANCA <1:20 Neg:<1:20 titer    Atypical pANCA <1:20 Neg:<1:20 titer    MPO AB <9 0 0 0 - 9 0 U/mL    WV-3 AB <3 5 0 0 - 3 5 U/mL    P-ANCA <1:20 Neg:<1:20 titer   C4 complement   Result Value Ref Range    C4, COMPLEMENT 30 0 10 0 - 40 0 mg/dL   C3 complement   Result Value Ref Range    C3 Complement 127 0 90 0 - 180 0 mg/dL   Chronic Hepatitis Panel   Result Value Ref Range    Hepatitis B Surface Ag Non-reactive Non-reactive, NonReactive - Confirmed    Hepatitis C Ab Non-reactive Non-reactive    Hep B C IgM Non-reactive Non-reactive    Hep B Core Total Ab Non-reactive Non-reactive   Cryoglobulin   Result Value Ref Range    Cryoglobulin Comment None detected   Immunoglobulin free LT chains blood   Result Value Ref Range    Ig Kappa Free Light Chain 25 0 (H) 3 3 - 19 4 mg/L    Ig Lambda Free Light Chain 28 3 (H) 5 7 - 26 3 mg/L    Kappa/Lambda FluidC Ratio 0 88 0 26 - 1 65   Protein electrophoresis, serum   Result Value Ref Range    A/G Ratio 0 66 (L) 1 10 - 1 80    Albumin Electrophoresis 39 7 (L) 52 0 - 65 0 %    Albumin CONC 1 67 (L) 3 50 - 5 00 g/dl    Alpha 1 4 6 2 5 - 5 0 %    ALPHA 1 CONC 0 19 0 10 - 0 40 g/dL    Alpha 2 27 2 (H) 7 0 - 13 0 %    ALPHA 2 CONC 1 14 0 40 - 1 20 g/dL    Beta-1 4 6 (L) 5 0 - 13 0 %    BETA 1 CONC 0 19 (L) 0 40 - 0 80 g/dL    Beta-2 10 1 (H) 2 0 - 8 0 %    BETA 2 CONC 0 42 0 20 - 0 50 g/dL    Gamma Globulin 13 8 12 0 - 22 0 %    GAMMA CONC 0 58 0 50 - 1 60 g/dL    Total Protein 4 2 (L) 6 4 - 8 2 g/dL    SPEP Interpretation       No monoclonal bands noted  Reviewed by: [facility], MD, PhD ([id number]) **Electronic Signature**   APTT   Result Value Ref Range    PTT 29 23 - 37 seconds   Protime-INR   Result Value Ref Range    Protime 12 7 11 6 - 14 5 seconds    INR 1 01 0 84 - 1 19   Vitamin D 25 hydroxy   Result Value Ref Range    Vit D, 25-Hydroxy 12 5 (L) 30 0 - 100 0 ng/mL   C1 esterase inhibitor   Result Value Ref Range    C1 Esterase Inhibitor 44 (H) 21 - 39 mg/dL   Protein electrophoresis, urine   Result Value Ref Range    Urine Protein 613 0 (H) 2 0 - 17 5 mg/dL    Albumin ELP, Urine 80 0 %    Alpha 1, Urine 7 5 %    Alpha 2, Urine 3 1 %    Beta, Urine 5 7 %    Gamma Globulin, Urine 3 7 %    UPEP Interp       The UPEP shows non-selective proteinuria   No monoclonal bands noted  Reviewed by: [facility], MD, PhD ([id number])  **Electronic Signature**   Comprehensive metabolic panel   Result Value Ref Range    Sodium 140 136 - 145 mmol/L    Potassium 4 1 3 5 - 5 3 mmol/L    Chloride 110 (H) 100 - 108 mmol/L    CO2 26 21 - 32 mmol/L    ANION GAP 4 4 - 13 mmol/L    BUN 15 5 - 25 mg/dL    Creatinine 0 60 0 60 - 1 30 mg/dL    Glucose 88 65 - 140 mg/dL    Calcium 7 5 (L) 8 3 - 10 1 mg/dL    AST 30 5 - 45 U/L    ALT 29 12 - 78 U/L    Alkaline Phosphatase 45 (L) 46 - 116 U/L    Total Protein 4 2 (L) 6 4 - 8 2 g/dL    Albumin 0 8 (L) 3 5 - 5 0 g/dL    Total Bilirubin 0 30 0 20 - 1 00 mg/dL    eGFR 113 ml/min/1 73sq m   CBC   Result Value Ref Range    WBC 7 90 4 31 - 10 16 Thousand/uL    RBC 4 04 3 81 - 5 12 Million/uL    Hemoglobin 12 1 11 5 - 15 4 g/dL    Hematocrit 37 1 34 8 - 46 1 %    MCV 92 82 - 98 fL    MCH 30 0 26 8 - 34 3 pg    MCHC 32 6 31 4 - 37 4 g/dL    RDW 12 7 11 6 - 15 1 %    Platelets 127 221 - 159 Thousands/uL    MPV 8 8 (L) 8 9 - 12 7 fL   Iron Saturation %   Result Value Ref Range    Iron Saturation 40 %    TIBC 119 (L) 250 - 450 ug/dL    Iron 48 (L) 50 - 170 ug/dL   Ferritin   Result Value Ref Range    Ferritin 57 8 - 388 ng/mL   Pregnancy Test (HCG Qualitative)   Result Value Ref Range    Preg, Serum Negative Negative   Basic metabolic panel   Result Value Ref Range    Sodium 141 136 - 145 mmol/L    Potassium 3 8 3 5 - 5 3 mmol/L    Chloride 110 (H) 100 - 108 mmol/L    CO2 29 21 - 32 mmol/L    ANION GAP 2 (L) 4 - 13 mmol/L    BUN 14 5 - 25 mg/dL    Creatinine 0 61 0 60 - 1 30 mg/dL    Glucose 90 65 - 140 mg/dL    Calcium 7 7 (L) 8 3 - 10 1 mg/dL    eGFR 112 ml/min/1 73sq m   Magnesium   Result Value Ref Range    Magnesium 1 9 1 6 - 2 6 mg/dL   CBC and differential   Result Value Ref Range    WBC 6 77 4 31 - 10 16 Thousand/uL    RBC 4 22 3 81 - 5 12 Million/uL    Hemoglobin 12 6 11 5 - 15 4 g/dL    Hematocrit 38 5 34 8 - 46 1 %    MCV 91 82 - 98 fL    MCH 29 9 26 8 - 34 3 pg    MCHC 32 7 31 4 - 37 4 g/dL    RDW 12 5 11 6 - 15 1 %    MPV 8 6 (L) 8 9 - 12 7 fL    Platelets 797 344 - 715 Thousands/uL    nRBC 0 /100 WBCs    Neutrophils Relative 36 (L) 43 - 75 %    Immat GRANS % 0 0 - 2 %    Lymphocytes Relative 44 14 - 44 %    Monocytes Relative 9 4 - 12 %    Eosinophils Relative 10 (H) 0 - 6 %    Basophils Relative 1 0 - 1 %    Neutrophils Absolute 2 43 1 85 - 7 62 Thousands/µL    Immature Grans Absolute 0 02 0 00 - 0 20 Thousand/uL    Lymphocytes Absolute 2 99 0 60 - 4 47 Thousands/µL    Monocytes Absolute 0 60 0 17 - 1 22 Thousand/µL    Eosinophils Absolute 0 65 (H) 0 00 - 0 61 Thousand/µL    Basophils Absolute 0 08 0 00 - 0 10 Thousands/µL   CBC and differential   Result Value Ref Range    WBC 7 37 4 31 - 10 16 Thousand/uL    RBC 3 98 3 81 - 5 12 Million/uL    Hemoglobin 11 8 11 5 - 15 4 g/dL    Hematocrit 36 5 34 8 - 46 1 %    MCV 92 82 - 98 fL    MCH 29 6 26 8 - 34 3 pg    MCHC 32 3 31 4 - 37 4 g/dL    RDW 12 2 11 6 - 15 1 %    MPV 8 5 (L) 8 9 - 12 7 fL    Platelets 670 047 - 128 Thousands/uL    nRBC 0 /100 WBCs    Neutrophils Relative 46 43 - 75 %    Immat GRANS % 0 0 - 2 %    Lymphocytes Relative 38 14 - 44 %    Monocytes Relative 8 4 - 12 %    Eosinophils Relative 7 (H) 0 - 6 %    Basophils Relative 1 0 - 1 %    Neutrophils Absolute 3 41 1 85 - 7 62 Thousands/µL    Immature Grans Absolute 0 01 0 00 - 0 20 Thousand/uL    Lymphocytes Absolute 2 80 0 60 - 4 47 Thousands/µL    Monocytes Absolute 0 57 0 17 - 1 22 Thousand/µL    Eosinophils Absolute 0 51 0 00 - 0 61 Thousand/µL    Basophils Absolute 0 07 0 00 - 0 10 Thousands/µL   Basic metabolic panel   Result Value Ref Range    Sodium 138 136 - 145 mmol/L    Potassium 3 9 3 5 - 5 3 mmol/L    Chloride 107 100 - 108 mmol/L    CO2 29 21 - 32 mmol/L    ANION GAP 2 (L) 4 - 13 mmol/L    BUN 12 5 - 25 mg/dL    Creatinine 0 57 (L) 0 60 - 1 30 mg/dL    Glucose 86 65 - 140 mg/dL    Calcium 7 1 (L) 8 3 - 10 1 mg/dL    eGFR 115 ml/min/1 73sq m   Tissue Exam   Result Value Ref Range    Case Report       Surgical Pathology Report                         Case: [id number]                                   Authorizing Provider:  Aguila Alberts MD             Collected:           09/25/2019 1518              Ordering Location:     [facility]        Received:            09/25/2019 1544                                     [place] [facility]                                                                    Pathologist:           Fuad Rendon MD                                                             Specimen:    Kidney, Left                                                                               Final Diagnosis       A  Kidney, Left, needle core biopsy:  -  Renal cortical parenchyma, gross evaluation only  -  The entire specimen, including tissue for electron microscopy, light microscopy and      immunofluorescence is sent to [facility] for expert      nephropathologist evaluation   -  A full report from that institution will follow under separate cover  Interpretation performed at [facility], [address] [place] [address]  Additional Information       All controls performed with the immunohistochemical stains reported above reacted appropriately  These tests were developed and their performance characteristics determined by [facility] or [facility]  They may not be cleared or approved by the [place]  Food and Drug Administration  The FDA has determined that such clearance or approval is not necessary  These tests are used for clinical purposes  They should not be regarded as investigational or for research  This laboratory has been approved by [place] 88, designated as a high-complexity laboratory and is qualified to perform these tests  Intraoperative Consultation       Gross Microscopic Examination: Glomeruli present  Dr Billy Aguero present @ diagnosis 9/25/19, 1540 hours  *Electronic SignatureErnst YEVGENIY Granados  Note: 4 cores placed in formalin prior to assessment/removed and evaluated with glomeruli seen  Additional fresh cores for EM/IF obtained  EMM      Gross Description       A   The specimen is received in formalin and fresh, labeled with the patient's name and hospital number, and is designated "left kidney"  The specimen consists of 6 tan-red cores of soft tissue each measuring from 1 0-1 5 cm in length  Each is 0 1 cm in diameter  The specimen is sent out for electron microscopy, light microscopy and immunofluorescence to:    [facility]  Dept of Pathology  [facility], [address] [place] [address]    Gross examination only  No sections submitted   EMM      Clinical Information ISA    ED Urine Macroscopic   Result Value Ref Range    Color, UA Yellow     Clarity, UA Clear     pH, UA 8 5 (H) 4 5 - 8 0    Leukocytes, UA Negative Negative    Nitrite, UA Negative Negative    Protein, UA >=300 (A) Negative mg/dl    Glucose, UA Negative Negative mg/dl    Ketones, UA Negative Negative mg/dl    Urobilinogen, UA 0 2 0 2, 1 0 E U /dl E U /dl    Bilirubin, UA Negative Negative    Blood, UA Negative Negative    Specific Gravity, UA 1 025 1 003 - 1 030

## 2019-10-01 ENCOUNTER — TELEPHONE (OUTPATIENT)
Dept: OTHER | Facility: HOSPITAL | Age: 44
End: 2019-10-01

## 2019-10-01 DIAGNOSIS — N04.9 NEPHROTIC SYNDROME: ICD-10-CM

## 2019-10-01 DIAGNOSIS — N05.0 MINIMAL CHANGE DISEASE: ICD-10-CM

## 2019-10-01 DIAGNOSIS — N04.0 NEPHROTIC SYNDROME WITH LESION OF MINIMAL CHANGE GLOMERULONEPHRITIS: Primary | ICD-10-CM

## 2019-10-01 DIAGNOSIS — R60.0 BILATERAL EDEMA OF LOWER EXTREMITY: ICD-10-CM

## 2019-10-01 RX ORDER — ATORVASTATIN CALCIUM 20 MG/1
20 TABLET, FILM COATED ORAL
Qty: 90 TABLET | Refills: 1 | Status: SHIPPED | OUTPATIENT
Start: 2019-10-01 | End: 2020-02-05

## 2019-10-01 RX ORDER — TORSEMIDE 5 MG/1
TABLET ORAL
Qty: 135 TABLET | Refills: 1 | Status: SHIPPED | OUTPATIENT
Start: 2019-10-01 | End: 2019-11-13

## 2019-10-01 RX ORDER — PREDNISONE 20 MG/1
60 TABLET ORAL DAILY
Qty: 180 TABLET | Refills: 0 | Status: SHIPPED | OUTPATIENT
Start: 2019-10-01 | End: 2019-11-19 | Stop reason: SDUPTHER

## 2019-10-01 NOTE — TELEPHONE ENCOUNTER
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

## 2019-10-14 LAB — MISCELLANEOUS LAB TEST RESULT: NORMAL

## 2019-11-04 NOTE — ED NOTES
Physical Exam   Constitutional: She is oriented to person, place, and time  She appears well-developed and well-nourished  HENT:   Head: Normocephalic and atraumatic  Right Ear: External ear normal    Left Ear: External ear normal    Nose: Nose normal    Eyes: Pupils are equal, round, and reactive to light  EOM are normal    Neck: Normal range of motion  Cardiovascular: Normal rate, regular rhythm, normal heart sounds and intact distal pulses  Exam reveals no gallop and no friction rub  No murmur heard  Pulmonary/Chest: Breath sounds normal  No respiratory distress  She has no wheezes  She exhibits no tenderness  Abdominal: Bowel sounds are normal  She exhibits no distension  There is no tenderness  Musculoskeletal: Normal range of motion  She exhibits edema  She exhibits no tenderness or deformity  Neurological: She is alert and oriented to person, place, and time  Skin: Skin is warm and dry  No erythema  Nursing note and vitals reviewed  Review of Systems   Constitutional: Positive for appetite change  Negative for activity change, fatigue and fever  HENT: Negative  Eyes: Negative  Respiratory: Negative  Negative for wheezing  Cardiovascular: Positive for leg swelling  Negative for chest pain and palpitations  Gastrointestinal: Positive for nausea and vomiting  Negative for diarrhea  Endocrine: Negative  Genitourinary: Negative  Neurological: Negative  Negative for numbness and headaches  Patient was seen and examined on 9/6/19 in the [place] [facility]  Patient had previous note please see below  ROS and PE was added to this note       Merline Listen, MD  11/04/19 1410       Merline Listen, MD  11/04/19 1410

## 2019-11-09 ENCOUNTER — APPOINTMENT (OUTPATIENT)
Dept: LAB | Age: 44
End: 2019-11-09
Payer: COMMERCIAL

## 2019-11-09 DIAGNOSIS — N04.0 NEPHROTIC SYNDROME WITH LESION OF MINIMAL CHANGE GLOMERULONEPHRITIS: ICD-10-CM

## 2019-11-09 LAB
ALBUMIN SERPL BCP-MCNC: 3.4 G/DL (ref 3.5–5)
ALP SERPL-CCNC: 31 U/L (ref 46–116)
ALT SERPL W P-5'-P-CCNC: 19 U/L (ref 12–78)
ANION GAP SERPL CALCULATED.3IONS-SCNC: 6 MMOL/L (ref 4–13)
AST SERPL W P-5'-P-CCNC: 8 U/L (ref 5–45)
BILIRUB SERPL-MCNC: 0.79 MG/DL (ref 0.2–1)
BUN SERPL-MCNC: 11 MG/DL (ref 5–25)
CALCIUM SERPL-MCNC: 8.5 MG/DL (ref 8.3–10.1)
CHLORIDE SERPL-SCNC: 108 MMOL/L (ref 100–108)
CO2 SERPL-SCNC: 27 MMOL/L (ref 21–32)
CREAT SERPL-MCNC: 0.63 MG/DL (ref 0.6–1.3)
CREAT UR-MCNC: 168 MG/DL
GFR SERPL CREATININE-BSD FRML MDRD: 111 ML/MIN/1.73SQ M
GLUCOSE P FAST SERPL-MCNC: 78 MG/DL (ref 65–99)
POTASSIUM SERPL-SCNC: 3.8 MMOL/L (ref 3.5–5.3)
PROT SERPL-MCNC: 6.3 G/DL (ref 6.4–8.2)
PROT UR-MCNC: 14 MG/DL
PROT/CREAT UR: 0.08 MG/G{CREAT} (ref 0–0.1)
SODIUM SERPL-SCNC: 141 MMOL/L (ref 136–145)

## 2019-11-09 PROCEDURE — 36415 COLL VENOUS BLD VENIPUNCTURE: CPT

## 2019-11-09 PROCEDURE — 82570 ASSAY OF URINE CREATININE: CPT

## 2019-11-09 PROCEDURE — 84156 ASSAY OF PROTEIN URINE: CPT

## 2019-11-09 PROCEDURE — 80053 COMPREHEN METABOLIC PANEL: CPT

## 2019-11-13 ENCOUNTER — OFFICE VISIT (OUTPATIENT)
Dept: NEPHROLOGY | Facility: CLINIC | Age: 44
End: 2019-11-13
Payer: COMMERCIAL

## 2019-11-13 VITALS
DIASTOLIC BLOOD PRESSURE: 90 MMHG | WEIGHT: 124 LBS | HEIGHT: 64 IN | SYSTOLIC BLOOD PRESSURE: 126 MMHG | BODY MASS INDEX: 21.17 KG/M2

## 2019-11-13 DIAGNOSIS — N05.0 MINIMAL CHANGE DISEASE: Primary | ICD-10-CM

## 2019-11-13 DIAGNOSIS — E78.5 HYPERLIPIDEMIA, UNSPECIFIED HYPERLIPIDEMIA TYPE: ICD-10-CM

## 2019-11-13 DIAGNOSIS — R80.9 PROTEINURIA, UNSPECIFIED TYPE: ICD-10-CM

## 2019-11-13 DIAGNOSIS — N04.0 NEPHROTIC SYNDROME WITH LESION OF MINIMAL CHANGE GLOMERULONEPHRITIS: ICD-10-CM

## 2019-11-13 PROCEDURE — 99213 OFFICE O/P EST LOW 20 MIN: CPT | Performed by: INTERNAL MEDICINE

## 2019-11-13 RX ORDER — ASCORBIC ACID 500 MG
500 TABLET ORAL DAILY
COMMUNITY
End: 2019-11-13

## 2019-11-13 RX ORDER — FAMOTIDINE 20 MG/1
20 TABLET, FILM COATED ORAL DAILY
COMMUNITY
End: 2021-10-12 | Stop reason: ALTCHOICE

## 2019-11-13 NOTE — PATIENT INSTRUCTIONS
Decrease Prednisone to 50 mg once a day now  Decrease Prednisone to 40 mg once a day on December 12, 2019  Decrease Prednisone to 30 mg once a day on January 9, 2020  Decrease Prednosine to 20 mg once a day on February 6, 2020

## 2019-11-13 NOTE — PROGRESS NOTES
NEPHROLOGY OFFICE PROGRESS NOTE   Mayi Frazier 45 y o  female MRN: [id number]  DATE: 11/13/19  Reason for visit: Continued evaluation of nephrotic syndrome    ASSESSMENT & PLAN:   1  Nephrotic syndrome due to minimal change disease:  · Started Prednisone on 60 mg daily on 10/1/19  · Response has been excellent with improvement in albumin to 3 4 and UPC ratio down to 0 08    · No need for anticoagulation at this time given brisk response in proteinuria to steroids  · She is now in remission and would wean Prednisone by 10 mg every 4 weeks  2  Prophylaxis:  · Continue calcium supplements 1 tablet twice a day  · Continue Vitamin D3 1000 units once a day  · Continue Pepcid 20 mg daily    3  Bilateral leg edema: resolved  4  Hyperlipidemia: Due to nephrotic syndrome  Repeat lipid panel in January 2020         Patient Instructions   Decrease Prednisone to 50 mg once a day now  Decrease Prednisone to 40 mg once a day on December 12, 2019  Decrease Prednisone to 30 mg once a day on January 9, 2020  Decrease Prednosine to 20 mg once a day on February 6, 2020  SUBJECTIVE / INTERVAL HISTORY:  Mayi Frazier is a 72-year-old lady who I am seeing for the 1st time in the office for continued evaluation of nephrotic syndrome  We met her during a recent admission to the hospital in September 2019 when she presented with worsening leg edema  A workup was done which revealed nephrotic syndrome prompting a renal biopsy  She had the renal biopsy done on September 25, 2019 which revealed minimal change disease  She followed up with us in the office and was started on prednisone  Since her last office visit, she has been doing relatively well  She denies any acute complaints at this time  She has responded well to Prednisone and notes significant improvement in her edema  Her weight has dropped back to previous levels  She has not needed the Torsemide in weeks and has since stopped them   No recent hospital stays or ER visits  PMH/PSH: SHANTEL  Previous work up:   September 25, 2019 Renal biopsy:  1  Minimal change disease  2  Arteriosclerosis, mild to moderate  9/23/19: ZAYNAB negative, KL ratio 0 88, UPEP No M spike, SPEP No M spike, Hep B and C NR, C3 127, C4 30, ANCA negative, anti PLA2R Ab negative    9/23/19 Renal US: R 11 8 cm, L 11 4 cm, no hydronephrosis  ALLERGIES: No Known Allergies    REVIEW OF SYSTEMS:  Review of Systems   Constitutional: Negative for appetite change, chills, fatigue and fever  Respiratory: Negative for cough and shortness of breath  Cardiovascular: Negative for chest pain and leg swelling  Gastrointestinal: Negative for abdominal pain, diarrhea, nausea and vomiting  Genitourinary: Negative for dysuria and hematuria  Musculoskeletal: Negative for arthralgias and back pain  Neurological: Negative for dizziness and light-headedness  Psychiatric/Behavioral: The patient is nervous/anxious  OBJECTIVE:  /90   Ht 5' 4" (1 626 m)   Wt 56 2 kg (124 lb)   BMI 21 28 kg/m²   Current Weight:   Body mass index is 21 28 kg/m²  Physical Exam   Constitutional: She is oriented to person, place, and time  She appears well-developed and well-nourished  She is cooperative  No distress  HENT:   Head: Normocephalic and atraumatic  Mouth/Throat: Mucous membranes are normal    Eyes: Conjunctivae are normal  No scleral icterus  Neck: No JVD present  Cardiovascular: Normal rate, regular rhythm and normal heart sounds  Pulmonary/Chest: Effort normal and breath sounds normal    Abdominal: Soft  Bowel sounds are normal    Musculoskeletal: She exhibits no edema  Neurological: She is alert and oriented to person, place, and time  Skin: Skin is warm and dry  Psychiatric: She has a normal mood and affect   Her behavior is normal  Judgment normal      Medications:  Current Outpatient Medications:     atorvastatin (LIPITOR) 20 mg tablet, Take 1 tablet (20 mg total) by mouth daily with dinner, Disp: 90 tablet, Rfl: 1    predniSONE 20 mg tablet, Take 3 tablets (60 mg total) by mouth daily, Disp: 180 tablet, Rfl: 0    torsemide (DEMADEX) 5 MG tablet, Take 5 mg daily alternating with 10 mg daily  , Disp: 135 tablet, Rfl: 1    Laboratory Results:  Results for orders placed or performed in visit on 11/09/19   Comprehensive metabolic panel   Result Value Ref Range    Sodium 141 136 - 145 mmol/L    Potassium 3 8 3 5 - 5 3 mmol/L    Chloride 108 100 - 108 mmol/L    CO2 27 21 - 32 mmol/L    ANION GAP 6 4 - 13 mmol/L    BUN 11 5 - 25 mg/dL    Creatinine 0 63 0 60 - 1 30 mg/dL    Glucose, Fasting 78 65 - 99 mg/dL    Calcium 8 5 8 3 - 10 1 mg/dL    AST 8 5 - 45 U/L    ALT 19 12 - 78 U/L    Alkaline Phosphatase 31 (L) 46 - 116 U/L    Total Protein 6 3 (L) 6 4 - 8 2 g/dL    Albumin 3 4 (L) 3 5 - 5 0 g/dL    Total Bilirubin 0 79 0 20 - 1 00 mg/dL    eGFR 111 ml/min/1 73sq m   Protein / creatinine ratio, urine   Result Value Ref Range    Creatinine, Ur 168 0 mg/dL    Protein Urine Random 14 mg/dL    Prot/Creat Ratio, Ur 0 08 0 00 - 0 10

## 2019-11-13 NOTE — LETTER
November 13, 2019     Tyler Romero, [address] [facility]    Patient: Scott Rene   YOB: 1975   Date of Visit: 11/13/2019       Dear Dr Lynnell Koyanagi:    Thank you for referring Ccoo Huffman to me for evaluation  Below are my notes for this consultation  If you have questions, please do not hesitate to call me  I look forward to following your patient along with you  Sincerely,        Cedric Thorne MD        CC: No Recipients  Cedric Thorne MD  11/13/2019  1:35 PM  Sign at close encounter  [address] 45 y o  female MRN: [id number]  DATE: 11/13/19  Reason for visit: Continued evaluation of nephrotic syndrome    ASSESSMENT & PLAN:   1  Nephrotic syndrome due to minimal change disease:  · Started Prednisone on 60 mg daily on 10/1/19  · Response has been excellent with improvement in albumin to 3 4 and UPC ratio down to 0 08    · No need for anticoagulation at this time given brisk response in proteinuria to steroids  · She is now in remission and would wean Prednisone by 10 mg every 4 weeks  2  Prophylaxis:  · Continue calcium supplements 1 tablet twice a day  · Continue Vitamin D3 1000 units once a day  · Continue Pepcid 20 mg daily    3  Bilateral leg edema: resolved  4  Hyperlipidemia: Due to nephrotic syndrome  Repeat lipid panel in January 2020         Patient Instructions   Decrease Prednisone to 50 mg once a day now  Decrease Prednisone to 40 mg once a day on December 12, 2019  Decrease Prednisone to 30 mg once a day on January 9, 2020  Decrease Prednosine to 20 mg once a day on February 6, 2020  SUBJECTIVE / INTERVAL HISTORY:  Scott Rene is a 72-year-old lady who I am seeing for the 1st time in the office for continued evaluation of nephrotic syndrome  We met her during a recent admission to the hospital in September 2019 when she presented with worsening leg edema    A workup was done which revealed nephrotic syndrome prompting a renal biopsy  She had the renal biopsy done on September 25, 2019 which revealed minimal change disease  She followed up with us in the office and was started on prednisone  Since her last office visit, she has been doing relatively well  She denies any acute complaints at this time  She has responded well to Prednisone and notes significant improvement in her edema  Her weight has dropped back to previous levels  She has not needed the Torsemide in weeks and has since stopped them  No recent hospital stays or ER visits  PMH/PSH: SHANTEL  Previous work up:   September 25, 2019 Renal biopsy:  1  Minimal change disease  2  Arteriosclerosis, mild to moderate  9/23/19: ZAYNAB negative, KL ratio 0 88, UPEP No M spike, SPEP No M spike, Hep B and C NR, C3 127, C4 30, ANCA negative, anti PLA2R Ab negative    9/23/19 Renal US: R 11 8 cm, L 11 4 cm, no hydronephrosis  ALLERGIES: No Known Allergies    REVIEW OF SYSTEMS:  Review of Systems   Constitutional: Negative for appetite change, chills, fatigue and fever  Respiratory: Negative for cough and shortness of breath  Cardiovascular: Negative for chest pain and leg swelling  Gastrointestinal: Negative for abdominal pain, diarrhea, nausea and vomiting  Genitourinary: Negative for dysuria and hematuria  Musculoskeletal: Negative for arthralgias and back pain  Neurological: Negative for dizziness and light-headedness  Psychiatric/Behavioral: The patient is nervous/anxious  OBJECTIVE:  /90   Ht 5' 4" (1 626 m)   Wt 56 2 kg (124 lb)   BMI 21 28 kg/m²    Current Weight:   Body mass index is 21 28 kg/m²  Physical Exam   Constitutional: She is oriented to person, place, and time  She appears well-developed and well-nourished  She is cooperative  No distress  HENT:   Head: Normocephalic and atraumatic  Mouth/Throat: Mucous membranes are normal    Eyes: Conjunctivae are normal  No scleral icterus  Neck: No JVD present  Cardiovascular: Normal rate, regular rhythm and normal heart sounds  Pulmonary/Chest: Effort normal and breath sounds normal    Abdominal: Soft  Bowel sounds are normal    Musculoskeletal: She exhibits no edema  Neurological: She is alert and oriented to person, place, and time  Skin: Skin is warm and dry  Psychiatric: She has a normal mood and affect  Her behavior is normal  Judgment normal      Medications:  Current Outpatient Medications:     atorvastatin (LIPITOR) 20 mg tablet, Take 1 tablet (20 mg total) by mouth daily with dinner, Disp: 90 tablet, Rfl: 1    predniSONE 20 mg tablet, Take 3 tablets (60 mg total) by mouth daily, Disp: 180 tablet, Rfl: 0    torsemide (DEMADEX) 5 MG tablet, Take 5 mg daily alternating with 10 mg daily  , Disp: 135 tablet, Rfl: 1    Laboratory Results:  Results for orders placed or performed in visit on 11/09/19   Comprehensive metabolic panel   Result Value Ref Range    Sodium 141 136 - 145 mmol/L    Potassium 3 8 3 5 - 5 3 mmol/L    Chloride 108 100 - 108 mmol/L    CO2 27 21 - 32 mmol/L    ANION GAP 6 4 - 13 mmol/L    BUN 11 5 - 25 mg/dL    Creatinine 0 63 0 60 - 1 30 mg/dL    Glucose, Fasting 78 65 - 99 mg/dL    Calcium 8 5 8 3 - 10 1 mg/dL    AST 8 5 - 45 U/L    ALT 19 12 - 78 U/L    Alkaline Phosphatase 31 (L) 46 - 116 U/L    Total Protein 6 3 (L) 6 4 - 8 2 g/dL    Albumin 3 4 (L) 3 5 - 5 0 g/dL    Total Bilirubin 0 79 0 20 - 1 00 mg/dL    eGFR 111 ml/min/1 73sq m   Protein / creatinine ratio, urine   Result Value Ref Range    Creatinine, Ur 168 0 mg/dL    Protein Urine Random 14 mg/dL    Prot/Creat Ratio, Ur 0 08 0 00 - 0 10

## 2019-11-19 DIAGNOSIS — N05.0 MINIMAL CHANGE DISEASE: ICD-10-CM

## 2019-11-19 RX ORDER — PREDNISONE 10 MG/1
50 TABLET ORAL DAILY
Qty: 180 TABLET | Refills: 1 | Status: SHIPPED | OUTPATIENT
Start: 2019-11-19 | End: 2019-12-18 | Stop reason: SDUPTHER

## 2019-12-14 ENCOUNTER — APPOINTMENT (OUTPATIENT)
Dept: LAB | Age: 44
End: 2019-12-14
Payer: COMMERCIAL

## 2019-12-14 DIAGNOSIS — N05.0 MINIMAL CHANGE DISEASE: ICD-10-CM

## 2019-12-14 DIAGNOSIS — E78.5 HYPERLIPIDEMIA, UNSPECIFIED HYPERLIPIDEMIA TYPE: ICD-10-CM

## 2019-12-14 LAB
ALBUMIN SERPL BCP-MCNC: 3.3 G/DL (ref 3.5–5)
ANION GAP SERPL CALCULATED.3IONS-SCNC: 2 MMOL/L (ref 4–13)
BUN SERPL-MCNC: 12 MG/DL (ref 5–25)
CALCIUM SERPL-MCNC: 8.8 MG/DL (ref 8.3–10.1)
CHLORIDE SERPL-SCNC: 110 MMOL/L (ref 100–108)
CHOLEST SERPL-MCNC: 133 MG/DL (ref 50–200)
CO2 SERPL-SCNC: 29 MMOL/L (ref 21–32)
CREAT SERPL-MCNC: 0.73 MG/DL (ref 0.6–1.3)
CREAT UR-MCNC: 227 MG/DL
GFR SERPL CREATININE-BSD FRML MDRD: 102 ML/MIN/1.73SQ M
GLUCOSE P FAST SERPL-MCNC: 81 MG/DL (ref 65–99)
HDLC SERPL-MCNC: 74 MG/DL
LDLC SERPL CALC-MCNC: 44 MG/DL (ref 0–100)
NONHDLC SERPL-MCNC: 59 MG/DL
PHOSPHATE SERPL-MCNC: 3.8 MG/DL (ref 2.7–4.5)
POTASSIUM SERPL-SCNC: 3.8 MMOL/L (ref 3.5–5.3)
PROT UR-MCNC: 12 MG/DL
PROT/CREAT UR: 0.05 MG/G{CREAT} (ref 0–0.1)
SODIUM SERPL-SCNC: 141 MMOL/L (ref 136–145)
TRIGL SERPL-MCNC: 75 MG/DL

## 2019-12-14 PROCEDURE — 82570 ASSAY OF URINE CREATININE: CPT

## 2019-12-14 PROCEDURE — 84156 ASSAY OF PROTEIN URINE: CPT

## 2019-12-14 PROCEDURE — 80069 RENAL FUNCTION PANEL: CPT

## 2019-12-14 PROCEDURE — 36415 COLL VENOUS BLD VENIPUNCTURE: CPT

## 2019-12-14 PROCEDURE — 80061 LIPID PANEL: CPT

## 2019-12-17 ENCOUNTER — TELEPHONE (OUTPATIENT)
Dept: NEPHROLOGY | Facility: CLINIC | Age: 44
End: 2019-12-17

## 2019-12-17 NOTE — TELEPHONE ENCOUNTER
Message left on patient's voicemail     ----- Message from Jodi Young MD sent at 12/17/2019  1:11 PM EST -----  Please call patient and inform her that urine protein remains normal and kidney function is stable

## 2019-12-18 ENCOUNTER — TELEPHONE (OUTPATIENT)
Dept: NEPHROLOGY | Facility: CLINIC | Age: 44
End: 2019-12-18

## 2019-12-18 DIAGNOSIS — N05.0 MINIMAL CHANGE DISEASE: ICD-10-CM

## 2019-12-18 RX ORDER — PREDNISONE 10 MG/1
40 TABLET ORAL DAILY
Qty: 180 TABLET | Refills: 1 | Status: SHIPPED | OUTPATIENT
Start: 2019-12-18 | End: 2020-05-18

## 2019-12-18 NOTE — TELEPHONE ENCOUNTER
Bessy Leonard patient called and said since Dr Zaki Fontenot informed her her kidneys are stable she wants to know if she can stop taking Atorvastatin   Bessy's phone number is [phone]

## 2019-12-18 NOTE — TELEPHONE ENCOUNTER
I called patient to discuss  I found out that she misunderstood the instructions and weaned the steroids down rather quickly and she was almost done with the wean  I explained to her that the wean should have been slower to prevent a relapse  I recommend that she go back on Prednisone 40 mg daily until January 8, 2020  Decrease Prednisone to 30 mg on Jan 9, 2020  Decrease Prednsione to 20 mg on Feb 6, 2020  She may stop Atorvastatin as Lipid panel has improved with tx of nephrotic syndrome

## 2020-01-18 ENCOUNTER — TRANSCRIBE ORDERS (OUTPATIENT)
Dept: ADMINISTRATIVE | Age: 45
End: 2020-01-18

## 2020-02-05 ENCOUNTER — OFFICE VISIT (OUTPATIENT)
Dept: NEPHROLOGY | Facility: CLINIC | Age: 45
End: 2020-02-05
Payer: COMMERCIAL

## 2020-02-05 VITALS
HEIGHT: 64 IN | DIASTOLIC BLOOD PRESSURE: 82 MMHG | BODY MASS INDEX: 21.51 KG/M2 | WEIGHT: 126 LBS | SYSTOLIC BLOOD PRESSURE: 120 MMHG

## 2020-02-05 DIAGNOSIS — R80.9 PROTEINURIA, UNSPECIFIED TYPE: ICD-10-CM

## 2020-02-05 DIAGNOSIS — N04.0 NEPHROTIC SYNDROME WITH LESION OF MINIMAL CHANGE GLOMERULONEPHRITIS: Primary | ICD-10-CM

## 2020-02-05 DIAGNOSIS — N05.0 MINIMAL CHANGE DISEASE: ICD-10-CM

## 2020-02-05 PROCEDURE — 3008F BODY MASS INDEX DOCD: CPT | Performed by: INTERNAL MEDICINE

## 2020-02-05 PROCEDURE — 99214 OFFICE O/P EST MOD 30 MIN: CPT | Performed by: INTERNAL MEDICINE

## 2020-02-05 NOTE — PROGRESS NOTES
NEPHROLOGY OFFICE PROGRESS NOTE   Armando Diss 39 y o  female MRN: [id number]  DATE: 02/05/20  Reason for visit: Continued evaluation of nephrotic syndrome    ASSESSMENT & PLAN:   1  Nephrotic syndrome due to minimal change disease:  · Started Prednisone on 60 mg daily on 10/1/19  · Excellent response to glucocorticoids and most recent UPC is 0 05  Most recent albumin is 3 3  · Will recheck CMP and UPC ratio now and in March 2020  · Continue to wean prednisone as outlined below  2  Prophylaxis:  · Continue Vitamin D3 1000 units once a day  · Continue Pepcid 20 mg daily  · May stop Pepcid once steroids are off      3  Hyperlipidemia:  · Resolved  · Off Atorvastatin since December 2019  Patient Instructions   Start prednisone 20 mg once a day on February 6 up to February 20  Take prednisone 15 mg once a day from February 21 to March 6  Take prednisone 10 mg once a day from March 7 to March 20  Take prednisone 5 mg once a day from March 20 to April 3  No further prednisone starting April 4  Once you complete the prednisone, you do not need to take the Pepcid  Follow up in 3 months  SUBJECTIVE / INTERVAL HISTORY:  Manuela Berg was last seen in the office in November 2019  Doing well overall since then  No recent hospitalizations or ER visits  She is currently on a prednisone taper  Denies any acute complaints  She stopped Atorvastatin in December 2019  PMH/PSH: SHANTEL  Previous work up:   September 25, 2019 Renal biopsy:  1  Minimal change disease  2  Arteriosclerosis, mild to moderate  9/23/19: ZAYNAB negative, KL ratio 0 88, UPEP No M spike, SPEP No M spike, Hep B and C NR, C3 127, C4 30, ANCA negative, anti PLA2R Ab negative    9/23/19 Renal US: R 11 8 cm, L 11 4 cm, no hydronephrosis  ALLERGIES: No Known Allergies    REVIEW OF SYSTEMS:  Review of Systems   Constitutional: Negative for appetite change, chills, fatigue and fever     Respiratory: Negative for cough and shortness of breath  Cardiovascular: Negative for chest pain and leg swelling  Gastrointestinal: Negative for abdominal pain, diarrhea, nausea and vomiting  Genitourinary: Negative for dysuria and hematuria  Musculoskeletal: Negative for arthralgias and back pain  Neurological: Negative for dizziness and light-headedness  OBJECTIVE:  /82   Ht 5' 4" (1 626 m)   Wt 57 2 kg (126 lb)   BMI 21 63 kg/m²   Current Weight: Weight - Scale: 57 2 kg (126 lb) Body mass index is 21 63 kg/m²  Physical Exam   Constitutional: She is oriented to person, place, and time  She appears well-developed and well-nourished  She is cooperative  No distress  HENT:   Head: Normocephalic and atraumatic  Mouth/Throat: Mucous membranes are normal    Eyes: Conjunctivae are normal  No scleral icterus  Neck: No JVD present  Cardiovascular: Normal rate, regular rhythm and normal heart sounds  Pulmonary/Chest: Effort normal and breath sounds normal    Abdominal: Soft  Bowel sounds are normal    Musculoskeletal: She exhibits no edema  Neurological: She is alert and oriented to person, place, and time  Skin: Skin is warm and dry  Psychiatric: She has a normal mood and affect  Her behavior is normal      Medications:  Current Outpatient Medications:     famotidine (PEPCID) 20 mg tablet, Take 20 mg by mouth daily, Disp: , Rfl:     predniSONE 10 mg tablet, Take 4 tablets (40 mg total) by mouth daily Take 3 tablets daily starting Jan 9, 2020   Take 2 tablets daily starting Feb 6, 2020 , Disp: 180 tablet, Rfl: 1    VITAMIN D, CHOLECALCIFEROL, PO, Take by mouth, Disp: , Rfl:     Laboratory Results:  Results for orders placed or performed in visit on 12/14/19   Lipid panel   Result Value Ref Range    Cholesterol 133 50 - 200 mg/dL    Triglycerides 75 <=150 mg/dL    HDL, Direct 74 >=40 mg/dL    LDL Calculated 44 0 - 100 mg/dL    Non-HDL-Chol (CHOL-HDL) 59 mg/dl   Renal function panel   Result Value Ref Range    Albumin 3 3 (L) 3 5 - 5 0 g/dL    Calcium 8 8 8 3 - 10 1 mg/dL    Phosphorus 3 8 2 7 - 4 5 mg/dL    BUN 12 5 - 25 mg/dL    Creatinine 0 73 0 60 - 1 30 mg/dL    Sodium 141 136 - 145 mmol/L    Potassium 3 8 3 5 - 5 3 mmol/L    Chloride 110 (H) 100 - 108 mmol/L    CO2 29 21 - 32 mmol/L    ANION GAP 2 (L) 4 - 13 mmol/L    eGFR 102 ml/min/1 73sq m    Glucose, Fasting 81 65 - 99 mg/dL   Protein / creatinine ratio, urine   Result Value Ref Range    Creatinine, Ur 227 0 mg/dL    Protein Urine Random 12 mg/dL    Prot/Creat Ratio, Ur 0 05 0 00 - 0 10

## 2020-02-05 NOTE — PATIENT INSTRUCTIONS
Start prednisone 20 mg once a day on February 6 up to February 20  Take prednisone 15 mg once a day from February 21 to March 6  Take prednisone 10 mg once a day from March 7 to March 20  Take prednisone 5 mg once a day from March 20 to April 3  No further prednisone starting April 4  Once you complete the prednisone, you do not need to take the Pepcid  Follow up in 3 months

## 2020-02-05 NOTE — LETTER
February 5, 2020     Olivia Palacios, [address] [facility] [address]    Patient: Tameka Steinberg   YOB: 1975   Date of Visit: 2/5/2020       Dear Dr Rodas Ends:    Thank you for referring Ortega Castaneda to me for evaluation  Below are my notes for this consultation  If you have questions, please do not hesitate to call me  I look forward to following your patient along with you  Sincerely,        Nayely Greene MD        CC: No Recipients  Nayely Greene MD  2/5/2020  2:56 PM  Sign at close encounter  [address] [facility] [facility] 39 y o  female MRN: [id number]  DATE: 02/05/20  Reason for visit: Continued evaluation of nephrotic syndrome    ASSESSMENT & PLAN:   1  Nephrotic syndrome due to minimal change disease:  · Started Prednisone on 60 mg daily on 10/1/19  · Excellent response to glucocorticoids and most recent UPC is 0 05  Most recent albumin is 3 3  · Will recheck CMP and UPC ratio now and in March 2020  · Continue to wean prednisone as outlined below  2  Prophylaxis:  · Continue Vitamin D3 1000 units once a day  · Continue Pepcid 20 mg daily  · May stop Pepcid once steroids are off      3  Hyperlipidemia:  · Resolved  · Off Atorvastatin since December 2019  Patient Instructions   Start prednisone 20 mg once a day on February 6 up to February 20  Take prednisone 15 mg once a day from February 21 to March 6  Take prednisone 10 mg once a day from March 7 to March 20  Take prednisone 5 mg once a day from March 20 to April 3  No further prednisone starting April 4  Once you complete the prednisone, you do not need to take the Pepcid  Follow up in 3 months  SUBJECTIVE / INTERVAL HISTORY:  Haider Sena was last seen in the office in November 2019  Doing well overall since then  No recent hospitalizations or ER visits  She is currently on a prednisone taper  Denies any acute complaints    She stopped Atorvastatin in December 2019      PMH/PSH: SHANTEL  Previous work up:   September 25, 2019 Renal biopsy:  1  Minimal change disease  2  Arteriosclerosis, mild to moderate  9/23/19: ZAYNAB negative, KL ratio 0 88, UPEP No M spike, SPEP No M spike, Hep B and C NR, C3 127, C4 30, ANCA negative, anti PLA2R Ab negative    9/23/19 Renal US: R 11 8 cm, L 11 4 cm, no hydronephrosis  ALLERGIES: No Known Allergies    REVIEW OF SYSTEMS:  Review of Systems   Constitutional: Negative for appetite change, chills, fatigue and fever  Respiratory: Negative for cough and shortness of breath  Cardiovascular: Negative for chest pain and leg swelling  Gastrointestinal: Negative for abdominal pain, diarrhea, nausea and vomiting  Genitourinary: Negative for dysuria and hematuria  Musculoskeletal: Negative for arthralgias and back pain  Neurological: Negative for dizziness and light-headedness  OBJECTIVE:  /82   Ht 5' 4" (1 626 m)   Wt 57 2 kg (126 lb)   BMI 21 63 kg/m²    Current Weight: Weight - Scale: 57 2 kg (126 lb) Body mass index is 21 63 kg/m²  Physical Exam   Constitutional: She is oriented to person, place, and time  She appears well-developed and well-nourished  She is cooperative  No distress  HENT:   Head: Normocephalic and atraumatic  Mouth/Throat: Mucous membranes are normal    Eyes: Conjunctivae are normal  No scleral icterus  Neck: No JVD present  Cardiovascular: Normal rate, regular rhythm and normal heart sounds  Pulmonary/Chest: Effort normal and breath sounds normal    Abdominal: Soft  Bowel sounds are normal    Musculoskeletal: She exhibits no edema  Neurological: She is alert and oriented to person, place, and time  Skin: Skin is warm and dry  Psychiatric: She has a normal mood and affect   Her behavior is normal      Medications:  Current Outpatient Medications:     famotidine (PEPCID) 20 mg tablet, Take 20 mg by mouth daily, Disp: , Rfl:     predniSONE 10 mg tablet, Take 4 tablets (40 mg total) by mouth daily Take 3 tablets daily starting Jan 9, 2020   Take 2 tablets daily starting Feb 6, 2020 , Disp: 180 tablet, Rfl: 1    VITAMIN D, CHOLECALCIFEROL, PO, Take by mouth, Disp: , Rfl:     Laboratory Results:  Results for orders placed or performed in visit on 12/14/19   Lipid panel   Result Value Ref Range    Cholesterol 133 50 - 200 mg/dL    Triglycerides 75 <=150 mg/dL    HDL, Direct 74 >=40 mg/dL    LDL Calculated 44 0 - 100 mg/dL    Non-HDL-Chol (CHOL-HDL) 59 mg/dl   Renal function panel   Result Value Ref Range    Albumin 3 3 (L) 3 5 - 5 0 g/dL    Calcium 8 8 8 3 - 10 1 mg/dL    Phosphorus 3 8 2 7 - 4 5 mg/dL    BUN 12 5 - 25 mg/dL    Creatinine 0 73 0 60 - 1 30 mg/dL    Sodium 141 136 - 145 mmol/L    Potassium 3 8 3 5 - 5 3 mmol/L    Chloride 110 (H) 100 - 108 mmol/L    CO2 29 21 - 32 mmol/L    ANION GAP 2 (L) 4 - 13 mmol/L    eGFR 102 ml/min/1 73sq m    Glucose, Fasting 81 65 - 99 mg/dL   Protein / creatinine ratio, urine   Result Value Ref Range    Creatinine, Ur 227 0 mg/dL    Protein Urine Random 12 mg/dL    Prot/Creat Ratio, Ur 0 05 0 00 - 0 10

## 2020-02-29 ENCOUNTER — APPOINTMENT (OUTPATIENT)
Dept: LAB | Age: 45
End: 2020-02-29
Payer: COMMERCIAL

## 2020-02-29 DIAGNOSIS — N04.0 NEPHROTIC SYNDROME WITH LESION OF MINIMAL CHANGE GLOMERULONEPHRITIS: ICD-10-CM

## 2020-02-29 LAB
ALBUMIN SERPL BCP-MCNC: 3.5 G/DL (ref 3.5–5)
ALP SERPL-CCNC: 29 U/L (ref 46–116)
ALT SERPL W P-5'-P-CCNC: 18 U/L (ref 12–78)
ANION GAP SERPL CALCULATED.3IONS-SCNC: 4 MMOL/L (ref 4–13)
AST SERPL W P-5'-P-CCNC: 9 U/L (ref 5–45)
BILIRUB SERPL-MCNC: 0.91 MG/DL (ref 0.2–1)
BUN SERPL-MCNC: 16 MG/DL (ref 5–25)
CALCIUM SERPL-MCNC: 8.7 MG/DL (ref 8.3–10.1)
CHLORIDE SERPL-SCNC: 108 MMOL/L (ref 100–108)
CO2 SERPL-SCNC: 29 MMOL/L (ref 21–32)
CREAT SERPL-MCNC: 0.69 MG/DL (ref 0.6–1.3)
CREAT UR-MCNC: 128 MG/DL
GFR SERPL CREATININE-BSD FRML MDRD: 107 ML/MIN/1.73SQ M
GLUCOSE SERPL-MCNC: 74 MG/DL (ref 65–140)
POTASSIUM SERPL-SCNC: 3.6 MMOL/L (ref 3.5–5.3)
PROT SERPL-MCNC: 6.5 G/DL (ref 6.4–8.2)
PROT UR-MCNC: 9 MG/DL
PROT/CREAT UR: 0.07 MG/G{CREAT} (ref 0–0.1)
SODIUM SERPL-SCNC: 141 MMOL/L (ref 136–145)

## 2020-02-29 PROCEDURE — 84156 ASSAY OF PROTEIN URINE: CPT

## 2020-02-29 PROCEDURE — 36415 COLL VENOUS BLD VENIPUNCTURE: CPT

## 2020-02-29 PROCEDURE — 80053 COMPREHEN METABOLIC PANEL: CPT

## 2020-02-29 PROCEDURE — 82570 ASSAY OF URINE CREATININE: CPT

## 2020-03-02 ENCOUNTER — TELEPHONE (OUTPATIENT)
Dept: NEPHROLOGY | Facility: CLINIC | Age: 45
End: 2020-03-02

## 2020-03-02 NOTE — TELEPHONE ENCOUNTER
Message left on patient's voicemail that labs are normal     ----- Message from Justen Ocampo MD sent at 3/2/2020  9:30 AM EST -----Please call patient and let her know that labs look great   Urinary protein remains very low which is normal and blood protein (albumin) is now normal

## 2020-05-08 ENCOUNTER — TELEPHONE (OUTPATIENT)
Dept: NEPHROLOGY | Facility: CLINIC | Age: 45
End: 2020-05-08

## 2020-05-13 ENCOUNTER — TELEPHONE (OUTPATIENT)
Dept: NEPHROLOGY | Facility: CLINIC | Age: 45
End: 2020-05-13

## 2020-05-15 ENCOUNTER — APPOINTMENT (OUTPATIENT)
Dept: LAB | Age: 45
End: 2020-05-15
Payer: COMMERCIAL

## 2020-05-15 DIAGNOSIS — N04.0 NEPHROTIC SYNDROME WITH LESION OF MINIMAL CHANGE GLOMERULONEPHRITIS: ICD-10-CM

## 2020-05-15 LAB
ALBUMIN SERPL BCP-MCNC: 2.3 G/DL (ref 3.5–5)
ALP SERPL-CCNC: 41 U/L (ref 46–116)
ALT SERPL W P-5'-P-CCNC: 27 U/L (ref 12–78)
ANION GAP SERPL CALCULATED.3IONS-SCNC: 4 MMOL/L (ref 4–13)
AST SERPL W P-5'-P-CCNC: 28 U/L (ref 5–45)
BACTERIA UR QL AUTO: ABNORMAL /HPF
BILIRUB SERPL-MCNC: 0.62 MG/DL (ref 0.2–1)
BILIRUB UR QL STRIP: NEGATIVE
BUN SERPL-MCNC: 13 MG/DL (ref 5–25)
CALCIUM SERPL-MCNC: 8.5 MG/DL (ref 8.3–10.1)
CHLORIDE SERPL-SCNC: 105 MMOL/L (ref 100–108)
CLARITY UR: CLEAR
CO2 SERPL-SCNC: 30 MMOL/L (ref 21–32)
COLOR UR: YELLOW
CREAT SERPL-MCNC: 0.57 MG/DL (ref 0.6–1.3)
CREAT UR-MCNC: 137 MG/DL
ERYTHROCYTE [DISTWIDTH] IN BLOOD BY AUTOMATED COUNT: 11.8 % (ref 11.6–15.1)
GFR SERPL CREATININE-BSD FRML MDRD: 114 ML/MIN/1.73SQ M
GLUCOSE P FAST SERPL-MCNC: 78 MG/DL (ref 65–99)
GLUCOSE UR STRIP-MCNC: NEGATIVE MG/DL
HCT VFR BLD AUTO: 39.7 % (ref 34.8–46.1)
HGB BLD-MCNC: 13 G/DL (ref 11.5–15.4)
HGB UR QL STRIP.AUTO: NEGATIVE
HYALINE CASTS #/AREA URNS LPF: ABNORMAL /LPF
KETONES UR STRIP-MCNC: NEGATIVE MG/DL
LEUKOCYTE ESTERASE UR QL STRIP: NEGATIVE
MCH RBC QN AUTO: 29.9 PG (ref 26.8–34.3)
MCHC RBC AUTO-ENTMCNC: 32.7 G/DL (ref 31.4–37.4)
MCV RBC AUTO: 91 FL (ref 82–98)
NITRITE UR QL STRIP: NEGATIVE
NON-SQ EPI CELLS URNS QL MICRO: ABNORMAL /HPF
PH UR STRIP.AUTO: 6.5 [PH]
PLATELET # BLD AUTO: 329 THOUSANDS/UL (ref 149–390)
PMV BLD AUTO: 9.6 FL (ref 8.9–12.7)
POTASSIUM SERPL-SCNC: 4 MMOL/L (ref 3.5–5.3)
PROT SERPL-MCNC: 5.7 G/DL (ref 6.4–8.2)
PROT UR STRIP-MCNC: ABNORMAL MG/DL
PROT UR-MCNC: 1087 MG/DL
PROT/CREAT UR: 7.93 MG/G{CREAT} (ref 0–0.1)
RBC # BLD AUTO: 4.35 MILLION/UL (ref 3.81–5.12)
RBC #/AREA URNS AUTO: ABNORMAL /HPF
SODIUM SERPL-SCNC: 139 MMOL/L (ref 136–145)
SP GR UR STRIP.AUTO: 1.03 (ref 1–1.03)
UROBILINOGEN UR QL STRIP.AUTO: 0.2 E.U./DL
WBC # BLD AUTO: 6.12 THOUSAND/UL (ref 4.31–10.16)
WBC #/AREA URNS AUTO: ABNORMAL /HPF

## 2020-05-15 PROCEDURE — 36415 COLL VENOUS BLD VENIPUNCTURE: CPT

## 2020-05-15 PROCEDURE — 82570 ASSAY OF URINE CREATININE: CPT

## 2020-05-15 PROCEDURE — 85027 COMPLETE CBC AUTOMATED: CPT

## 2020-05-15 PROCEDURE — 84156 ASSAY OF PROTEIN URINE: CPT

## 2020-05-15 PROCEDURE — 81001 URINALYSIS AUTO W/SCOPE: CPT

## 2020-05-15 PROCEDURE — 80053 COMPREHEN METABOLIC PANEL: CPT

## 2020-05-18 ENCOUNTER — TELEPHONE (OUTPATIENT)
Dept: NEPHROLOGY | Facility: CLINIC | Age: 45
End: 2020-05-18

## 2020-05-18 DIAGNOSIS — N05.0 MINIMAL CHANGE DISEASE: Primary | ICD-10-CM

## 2020-05-18 RX ORDER — PREDNISONE 10 MG/1
60 TABLET ORAL DAILY
Qty: 180 TABLET | Refills: 1 | Status: SHIPPED | OUTPATIENT
Start: 2020-05-18 | End: 2020-07-22

## 2020-05-21 ENCOUNTER — TELEMEDICINE (OUTPATIENT)
Dept: NEPHROLOGY | Facility: CLINIC | Age: 45
End: 2020-05-21
Payer: COMMERCIAL

## 2020-05-21 DIAGNOSIS — N05.0 MINIMAL CHANGE DISEASE: ICD-10-CM

## 2020-05-21 DIAGNOSIS — N04.0 NEPHROTIC SYNDROME WITH LESION OF MINIMAL CHANGE GLOMERULONEPHRITIS: Primary | ICD-10-CM

## 2020-05-21 PROCEDURE — 99214 OFFICE O/P EST MOD 30 MIN: CPT | Performed by: INTERNAL MEDICINE

## 2020-05-21 RX ORDER — TORSEMIDE 10 MG/1
10 TABLET ORAL DAILY
Qty: 30 TABLET | Refills: 1 | Status: SHIPPED | OUTPATIENT
Start: 2020-05-21 | End: 2020-07-22

## 2020-06-13 ENCOUNTER — APPOINTMENT (OUTPATIENT)
Dept: LAB | Age: 45
End: 2020-06-13
Payer: COMMERCIAL

## 2020-06-13 DIAGNOSIS — N04.0 NEPHROTIC SYNDROME WITH LESION OF MINIMAL CHANGE GLOMERULONEPHRITIS: ICD-10-CM

## 2020-06-13 LAB
ALBUMIN SERPL BCP-MCNC: 2.9 G/DL (ref 3.5–5)
ANION GAP SERPL CALCULATED.3IONS-SCNC: 4 MMOL/L (ref 4–13)
BUN SERPL-MCNC: 13 MG/DL (ref 5–25)
CALCIUM SERPL-MCNC: 8.7 MG/DL (ref 8.3–10.1)
CHLORIDE SERPL-SCNC: 107 MMOL/L (ref 100–108)
CO2 SERPL-SCNC: 30 MMOL/L (ref 21–32)
CREAT SERPL-MCNC: 0.72 MG/DL (ref 0.6–1.3)
CREAT UR-MCNC: 176 MG/DL
GFR SERPL CREATININE-BSD FRML MDRD: 103 ML/MIN/1.73SQ M
GLUCOSE SERPL-MCNC: 79 MG/DL (ref 65–140)
PHOSPHATE SERPL-MCNC: 2.9 MG/DL (ref 2.7–4.5)
POTASSIUM SERPL-SCNC: 3.6 MMOL/L (ref 3.5–5.3)
PROT UR-MCNC: 13 MG/DL
PROT/CREAT UR: 0.07 MG/G{CREAT} (ref 0–0.1)
SODIUM SERPL-SCNC: 141 MMOL/L (ref 136–145)

## 2020-06-13 PROCEDURE — 80069 RENAL FUNCTION PANEL: CPT

## 2020-06-13 PROCEDURE — 82570 ASSAY OF URINE CREATININE: CPT

## 2020-06-13 PROCEDURE — 84156 ASSAY OF PROTEIN URINE: CPT

## 2020-06-13 PROCEDURE — 36415 COLL VENOUS BLD VENIPUNCTURE: CPT

## 2020-07-10 ENCOUNTER — APPOINTMENT (OUTPATIENT)
Dept: LAB | Age: 45
End: 2020-07-10
Payer: COMMERCIAL

## 2020-07-10 DIAGNOSIS — N04.0 NEPHROTIC SYNDROME WITH LESION OF MINIMAL CHANGE GLOMERULONEPHRITIS: ICD-10-CM

## 2020-07-10 LAB
ALBUMIN SERPL BCP-MCNC: 3.5 G/DL (ref 3.5–5)
ANION GAP SERPL CALCULATED.3IONS-SCNC: 6 MMOL/L (ref 4–13)
BUN SERPL-MCNC: 10 MG/DL (ref 5–25)
CALCIUM SERPL-MCNC: 8.5 MG/DL (ref 8.3–10.1)
CHLORIDE SERPL-SCNC: 106 MMOL/L (ref 100–108)
CO2 SERPL-SCNC: 27 MMOL/L (ref 21–32)
CREAT SERPL-MCNC: 0.66 MG/DL (ref 0.6–1.3)
CREAT UR-MCNC: 18 MG/DL
GFR SERPL CREATININE-BSD FRML MDRD: 109 ML/MIN/1.73SQ M
GLUCOSE SERPL-MCNC: 100 MG/DL (ref 65–140)
PHOSPHATE SERPL-MCNC: 2.6 MG/DL (ref 2.7–4.5)
POTASSIUM SERPL-SCNC: 3.7 MMOL/L (ref 3.5–5.3)
PROT UR-MCNC: <6 MG/DL
PROT/CREAT UR: <0.33 MG/G{CREAT} (ref 0–0.1)
SODIUM SERPL-SCNC: 139 MMOL/L (ref 136–145)

## 2020-07-10 PROCEDURE — 36415 COLL VENOUS BLD VENIPUNCTURE: CPT

## 2020-07-10 PROCEDURE — 84156 ASSAY OF PROTEIN URINE: CPT

## 2020-07-10 PROCEDURE — 82570 ASSAY OF URINE CREATININE: CPT

## 2020-07-10 PROCEDURE — 80069 RENAL FUNCTION PANEL: CPT

## 2020-07-22 DIAGNOSIS — N05.0 MINIMAL CHANGE DISEASE: ICD-10-CM

## 2020-07-22 DIAGNOSIS — N04.0 NEPHROTIC SYNDROME WITH LESION OF MINIMAL CHANGE GLOMERULONEPHRITIS: ICD-10-CM

## 2020-07-22 RX ORDER — TORSEMIDE 10 MG/1
TABLET ORAL
Qty: 30 TABLET | Refills: 1 | Status: SHIPPED | OUTPATIENT
Start: 2020-07-22 | End: 2020-10-30

## 2020-07-22 RX ORDER — PREDNISONE 10 MG/1
60 TABLET ORAL DAILY
Qty: 180 TABLET | Refills: 1 | Status: SHIPPED | OUTPATIENT
Start: 2020-07-22 | End: 2020-08-13

## 2020-08-08 ENCOUNTER — APPOINTMENT (OUTPATIENT)
Dept: LAB | Age: 45
End: 2020-08-08
Payer: COMMERCIAL

## 2020-08-08 DIAGNOSIS — N04.0 NEPHROTIC SYNDROME WITH LESION OF MINIMAL CHANGE GLOMERULONEPHRITIS: ICD-10-CM

## 2020-08-08 LAB
ALBUMIN SERPL BCP-MCNC: 3.3 G/DL (ref 3.5–5)
ALP SERPL-CCNC: 25 U/L (ref 46–116)
ALT SERPL W P-5'-P-CCNC: 19 U/L (ref 12–78)
ANION GAP SERPL CALCULATED.3IONS-SCNC: 5 MMOL/L (ref 4–13)
AST SERPL W P-5'-P-CCNC: 10 U/L (ref 5–45)
BACTERIA UR QL AUTO: ABNORMAL /HPF
BILIRUB SERPL-MCNC: 1.21 MG/DL (ref 0.2–1)
BILIRUB UR QL STRIP: NEGATIVE
BUN SERPL-MCNC: 12 MG/DL (ref 5–25)
CALCIUM SERPL-MCNC: 8.5 MG/DL (ref 8.3–10.1)
CHLORIDE SERPL-SCNC: 109 MMOL/L (ref 100–108)
CHOLEST SERPL-MCNC: 179 MG/DL (ref 50–200)
CLARITY UR: CLEAR
CO2 SERPL-SCNC: 28 MMOL/L (ref 21–32)
COLOR UR: YELLOW
CREAT SERPL-MCNC: 0.74 MG/DL (ref 0.6–1.3)
CREAT UR-MCNC: 189 MG/DL
ERYTHROCYTE [DISTWIDTH] IN BLOOD BY AUTOMATED COUNT: 13 % (ref 11.6–15.1)
FINE GRAN CASTS URNS QL MICRO: ABNORMAL /LPF
GFR SERPL CREATININE-BSD FRML MDRD: 100 ML/MIN/1.73SQ M
GLUCOSE P FAST SERPL-MCNC: 79 MG/DL (ref 65–99)
GLUCOSE UR STRIP-MCNC: NEGATIVE MG/DL
HCT VFR BLD AUTO: 42.7 % (ref 34.8–46.1)
HDLC SERPL-MCNC: 90 MG/DL
HGB BLD-MCNC: 13.9 G/DL (ref 11.5–15.4)
HGB UR QL STRIP.AUTO: NEGATIVE
HYALINE CASTS #/AREA URNS LPF: ABNORMAL /LPF
KETONES UR STRIP-MCNC: NEGATIVE MG/DL
LDLC SERPL CALC-MCNC: 64 MG/DL (ref 0–100)
LEUKOCYTE ESTERASE UR QL STRIP: NEGATIVE
MAGNESIUM SERPL-MCNC: 2.5 MG/DL (ref 1.6–2.6)
MCH RBC QN AUTO: 31.4 PG (ref 26.8–34.3)
MCHC RBC AUTO-ENTMCNC: 32.6 G/DL (ref 31.4–37.4)
MCV RBC AUTO: 96 FL (ref 82–98)
MUCOUS THREADS UR QL AUTO: ABNORMAL
NITRITE UR QL STRIP: NEGATIVE
NON-SQ EPI CELLS URNS QL MICRO: ABNORMAL /HPF
NONHDLC SERPL-MCNC: 89 MG/DL
PH UR STRIP.AUTO: 6.5 [PH]
PLATELET # BLD AUTO: 305 THOUSANDS/UL (ref 149–390)
PMV BLD AUTO: 9.1 FL (ref 8.9–12.7)
POTASSIUM SERPL-SCNC: 3.9 MMOL/L (ref 3.5–5.3)
PROT SERPL-MCNC: 6.4 G/DL (ref 6.4–8.2)
PROT UR STRIP-MCNC: NEGATIVE MG/DL
PROT UR-MCNC: 13 MG/DL
PROT/CREAT UR: 0.07 MG/G{CREAT} (ref 0–0.1)
RBC # BLD AUTO: 4.43 MILLION/UL (ref 3.81–5.12)
RBC #/AREA URNS AUTO: ABNORMAL /HPF
SODIUM SERPL-SCNC: 142 MMOL/L (ref 136–145)
SP GR UR STRIP.AUTO: 1.02 (ref 1–1.03)
TRIGL SERPL-MCNC: 125 MG/DL
UROBILINOGEN UR QL STRIP.AUTO: 0.2 E.U./DL
WBC # BLD AUTO: 11.38 THOUSAND/UL (ref 4.31–10.16)
WBC #/AREA URNS AUTO: ABNORMAL /HPF

## 2020-08-08 PROCEDURE — 83735 ASSAY OF MAGNESIUM: CPT

## 2020-08-08 PROCEDURE — 84156 ASSAY OF PROTEIN URINE: CPT

## 2020-08-08 PROCEDURE — 85027 COMPLETE CBC AUTOMATED: CPT

## 2020-08-08 PROCEDURE — 80061 LIPID PANEL: CPT

## 2020-08-08 PROCEDURE — 36415 COLL VENOUS BLD VENIPUNCTURE: CPT

## 2020-08-08 PROCEDURE — 80053 COMPREHEN METABOLIC PANEL: CPT

## 2020-08-08 PROCEDURE — 81001 URINALYSIS AUTO W/SCOPE: CPT

## 2020-08-08 PROCEDURE — 82570 ASSAY OF URINE CREATININE: CPT

## 2020-08-11 ENCOUNTER — TELEPHONE (OUTPATIENT)
Dept: NEPHROLOGY | Facility: CLINIC | Age: 45
End: 2020-08-11

## 2020-08-11 NOTE — TELEPHONE ENCOUNTER
Message left on patient's voicemail that labs are stable per [facility]       ----- Message from [place], [place] sent at 8/11/2020 12:00 PM EDT -----  Labs stable  No changes  Upc ratio minimal at 0 07  Creatinine electrolytes stable

## 2020-08-13 ENCOUNTER — TELEPHONE (OUTPATIENT)
Dept: NEPHROLOGY | Facility: CLINIC | Age: 45
End: 2020-08-13

## 2020-08-13 DIAGNOSIS — N05.0 MINIMAL CHANGE DISEASE: ICD-10-CM

## 2020-08-13 RX ORDER — PREDNISONE 10 MG/1
20 TABLET ORAL DAILY
Qty: 180 TABLET | Refills: 1 | Status: SHIPPED | OUTPATIENT
Start: 2020-08-13 | End: 2020-10-30 | Stop reason: SDUPTHER

## 2020-08-13 NOTE — TELEPHONE ENCOUNTER
Pt has enough Prednisone until tomorrow, 8/14/20  She indicates that Dr Kvng Bonilla was going to review her most recent labs and give further instructions on Prednisone taper  Since she only has enough Prednisone for tomorrow, she is asking for further instructions

## 2020-08-13 NOTE — PROGRESS NOTES
Data reviewed  Excellent response to treatment  Minimal protein excretion with improvement in albumin level  Continue prednisone wean  20 milligrams of prednisone daily x4 weeks

## 2020-08-14 NOTE — TELEPHONE ENCOUNTER
Patient can stop prednisone as flare appears to be resolved with normal blood work  She should let us know if she develops foamy urine or increased swelling  THANKS

## 2020-10-09 DIAGNOSIS — N05.0 MINIMAL CHANGE DISEASE: ICD-10-CM

## 2020-10-09 DIAGNOSIS — N04.0 NEPHROTIC SYNDROME WITH LESION OF MINIMAL CHANGE GLOMERULONEPHRITIS: Primary | ICD-10-CM

## 2020-10-23 ENCOUNTER — LAB (OUTPATIENT)
Dept: LAB | Age: 45
End: 2020-10-23
Payer: COMMERCIAL

## 2020-10-23 DIAGNOSIS — N04.0 NEPHROTIC SYNDROME WITH LESION OF MINIMAL CHANGE GLOMERULONEPHRITIS: ICD-10-CM

## 2020-10-23 DIAGNOSIS — N05.0 MINIMAL CHANGE DISEASE: ICD-10-CM

## 2020-10-23 LAB
ALBUMIN SERPL BCP-MCNC: 3.9 G/DL (ref 3.5–5)
ALP SERPL-CCNC: 32 U/L (ref 46–116)
ALT SERPL W P-5'-P-CCNC: 20 U/L (ref 12–78)
ANION GAP SERPL CALCULATED.3IONS-SCNC: 3 MMOL/L (ref 4–13)
AST SERPL W P-5'-P-CCNC: 13 U/L (ref 5–45)
BACTERIA UR QL AUTO: NORMAL /HPF
BILIRUB SERPL-MCNC: 0.87 MG/DL (ref 0.2–1)
BILIRUB UR QL STRIP: NEGATIVE
BUN SERPL-MCNC: 14 MG/DL (ref 5–25)
CALCIUM SERPL-MCNC: 9.6 MG/DL (ref 8.3–10.1)
CHLORIDE SERPL-SCNC: 106 MMOL/L (ref 100–108)
CLARITY UR: CLEAR
CO2 SERPL-SCNC: 30 MMOL/L (ref 21–32)
COLOR UR: YELLOW
CREAT SERPL-MCNC: 0.69 MG/DL (ref 0.6–1.3)
CREAT UR-MCNC: 74.2 MG/DL
ERYTHROCYTE [DISTWIDTH] IN BLOOD BY AUTOMATED COUNT: 12 % (ref 11.6–15.1)
GFR SERPL CREATININE-BSD FRML MDRD: 107 ML/MIN/1.73SQ M
GLUCOSE P FAST SERPL-MCNC: 87 MG/DL (ref 65–99)
GLUCOSE UR STRIP-MCNC: NEGATIVE MG/DL
HCT VFR BLD AUTO: 40.7 % (ref 34.8–46.1)
HGB BLD-MCNC: 13.4 G/DL (ref 11.5–15.4)
HGB UR QL STRIP.AUTO: NEGATIVE
HYALINE CASTS #/AREA URNS LPF: NORMAL /LPF
KETONES UR STRIP-MCNC: NEGATIVE MG/DL
LEUKOCYTE ESTERASE UR QL STRIP: NEGATIVE
MCH RBC QN AUTO: 31 PG (ref 26.8–34.3)
MCHC RBC AUTO-ENTMCNC: 32.9 G/DL (ref 31.4–37.4)
MCV RBC AUTO: 94 FL (ref 82–98)
NITRITE UR QL STRIP: NEGATIVE
NON-SQ EPI CELLS URNS QL MICRO: NORMAL /HPF
PH UR STRIP.AUTO: 6.5 [PH]
PLATELET # BLD AUTO: 336 THOUSANDS/UL (ref 149–390)
PMV BLD AUTO: 9.4 FL (ref 8.9–12.7)
POTASSIUM SERPL-SCNC: 3.9 MMOL/L (ref 3.5–5.3)
PROT SERPL-MCNC: 7.3 G/DL (ref 6.4–8.2)
PROT UR STRIP-MCNC: NEGATIVE MG/DL
PROT UR-MCNC: 8 MG/DL
PROT/CREAT UR: 0.11 MG/G{CREAT} (ref 0–0.1)
RBC # BLD AUTO: 4.32 MILLION/UL (ref 3.81–5.12)
RBC #/AREA URNS AUTO: NORMAL /HPF
SODIUM SERPL-SCNC: 139 MMOL/L (ref 136–145)
SP GR UR STRIP.AUTO: 1.02 (ref 1–1.03)
UROBILINOGEN UR QL STRIP.AUTO: 0.2 E.U./DL
WBC # BLD AUTO: 9.99 THOUSAND/UL (ref 4.31–10.16)
WBC #/AREA URNS AUTO: NORMAL /HPF

## 2020-10-23 PROCEDURE — 85027 COMPLETE CBC AUTOMATED: CPT

## 2020-10-23 PROCEDURE — 82570 ASSAY OF URINE CREATININE: CPT

## 2020-10-23 PROCEDURE — 81001 URINALYSIS AUTO W/SCOPE: CPT

## 2020-10-23 PROCEDURE — 80053 COMPREHEN METABOLIC PANEL: CPT

## 2020-10-23 PROCEDURE — 84156 ASSAY OF PROTEIN URINE: CPT

## 2020-10-23 PROCEDURE — 36415 COLL VENOUS BLD VENIPUNCTURE: CPT

## 2020-10-30 ENCOUNTER — TELEMEDICINE (OUTPATIENT)
Dept: NEPHROLOGY | Facility: CLINIC | Age: 45
End: 2020-10-30
Payer: COMMERCIAL

## 2020-10-30 DIAGNOSIS — N05.0 MINIMAL CHANGE DISEASE: ICD-10-CM

## 2020-10-30 DIAGNOSIS — N04.0 NEPHROTIC SYNDROME WITH LESION OF MINIMAL CHANGE GLOMERULONEPHRITIS: Primary | ICD-10-CM

## 2020-10-30 PROCEDURE — 99214 OFFICE O/P EST MOD 30 MIN: CPT | Performed by: INTERNAL MEDICINE

## 2020-10-30 RX ORDER — PREDNISONE 10 MG/1
10 TABLET ORAL EVERY OTHER DAY
Qty: 60 TABLET | Refills: 0 | Status: SHIPPED | OUTPATIENT
Start: 2020-10-30 | End: 2020-12-23 | Stop reason: SDUPTHER

## 2020-12-02 ENCOUNTER — LAB (OUTPATIENT)
Dept: LAB | Age: 45
End: 2020-12-02
Payer: COMMERCIAL

## 2020-12-02 DIAGNOSIS — N04.0 NEPHROTIC SYNDROME WITH LESION OF MINIMAL CHANGE GLOMERULONEPHRITIS: ICD-10-CM

## 2020-12-02 LAB
ALBUMIN SERPL BCP-MCNC: 3.3 G/DL (ref 3.5–5)
ALP SERPL-CCNC: 40 U/L (ref 46–116)
ALT SERPL W P-5'-P-CCNC: 26 U/L (ref 12–78)
ANION GAP SERPL CALCULATED.3IONS-SCNC: 4 MMOL/L (ref 4–13)
AST SERPL W P-5'-P-CCNC: 17 U/L (ref 5–45)
BILIRUB SERPL-MCNC: 0.59 MG/DL (ref 0.2–1)
BUN SERPL-MCNC: 16 MG/DL (ref 5–25)
CALCIUM ALBUM COR SERPL-MCNC: 9.8 MG/DL (ref 8.3–10.1)
CALCIUM SERPL-MCNC: 9.2 MG/DL (ref 8.3–10.1)
CHLORIDE SERPL-SCNC: 104 MMOL/L (ref 100–108)
CO2 SERPL-SCNC: 29 MMOL/L (ref 21–32)
CREAT SERPL-MCNC: 0.55 MG/DL (ref 0.6–1.3)
CREAT UR-MCNC: 48.7 MG/DL
GFR SERPL CREATININE-BSD FRML MDRD: 115 ML/MIN/1.73SQ M
GLUCOSE SERPL-MCNC: 77 MG/DL (ref 65–140)
POTASSIUM SERPL-SCNC: 4 MMOL/L (ref 3.5–5.3)
PROT SERPL-MCNC: 6.8 G/DL (ref 6.4–8.2)
PROT UR-MCNC: 199 MG/DL
PROT/CREAT UR: 4.09 MG/G{CREAT} (ref 0–0.1)
SODIUM SERPL-SCNC: 137 MMOL/L (ref 136–145)

## 2020-12-02 PROCEDURE — 36415 COLL VENOUS BLD VENIPUNCTURE: CPT

## 2020-12-02 PROCEDURE — 80053 COMPREHEN METABOLIC PANEL: CPT

## 2020-12-02 PROCEDURE — 84156 ASSAY OF PROTEIN URINE: CPT

## 2020-12-02 PROCEDURE — 82570 ASSAY OF URINE CREATININE: CPT

## 2020-12-03 ENCOUNTER — TELEPHONE (OUTPATIENT)
Dept: OTHER | Facility: HOSPITAL | Age: 45
End: 2020-12-03

## 2020-12-03 DIAGNOSIS — N04.0 NEPHROTIC SYNDROME WITH LESION OF MINIMAL CHANGE GLOMERULONEPHRITIS: Primary | ICD-10-CM

## 2020-12-22 ENCOUNTER — LAB (OUTPATIENT)
Dept: LAB | Age: 45
End: 2020-12-22
Payer: COMMERCIAL

## 2020-12-22 DIAGNOSIS — N04.0 NEPHROTIC SYNDROME WITH LESION OF MINIMAL CHANGE GLOMERULONEPHRITIS: ICD-10-CM

## 2020-12-22 LAB
ALBUMIN SERPL BCP-MCNC: 2.6 G/DL (ref 3.5–5)
ANION GAP SERPL CALCULATED.3IONS-SCNC: 4 MMOL/L (ref 4–13)
BUN SERPL-MCNC: 10 MG/DL (ref 5–25)
CALCIUM ALBUM COR SERPL-MCNC: 9.5 MG/DL (ref 8.3–10.1)
CALCIUM SERPL-MCNC: 8.4 MG/DL (ref 8.3–10.1)
CHLORIDE SERPL-SCNC: 108 MMOL/L (ref 100–108)
CO2 SERPL-SCNC: 30 MMOL/L (ref 21–32)
CREAT SERPL-MCNC: 0.54 MG/DL (ref 0.6–1.3)
CREAT UR-MCNC: 17.3 MG/DL
GFR SERPL CREATININE-BSD FRML MDRD: 116 ML/MIN/1.73SQ M
GLUCOSE SERPL-MCNC: 79 MG/DL (ref 65–140)
PHOSPHATE SERPL-MCNC: 3 MG/DL (ref 2.7–4.5)
POTASSIUM SERPL-SCNC: 3.6 MMOL/L (ref 3.5–5.3)
PROT UR-MCNC: 126 MG/DL
PROT/CREAT UR: 7.28 MG/G{CREAT} (ref 0–0.1)
SODIUM SERPL-SCNC: 142 MMOL/L (ref 136–145)

## 2020-12-22 PROCEDURE — 84156 ASSAY OF PROTEIN URINE: CPT

## 2020-12-22 PROCEDURE — 82570 ASSAY OF URINE CREATININE: CPT

## 2020-12-22 PROCEDURE — 36415 COLL VENOUS BLD VENIPUNCTURE: CPT

## 2020-12-22 PROCEDURE — 80069 RENAL FUNCTION PANEL: CPT

## 2020-12-23 ENCOUNTER — TELEPHONE (OUTPATIENT)
Dept: NEPHROLOGY | Facility: CLINIC | Age: 45
End: 2020-12-23

## 2020-12-23 DIAGNOSIS — N05.0 MINIMAL CHANGE DISEASE: ICD-10-CM

## 2020-12-23 DIAGNOSIS — N04.0 NEPHROTIC SYNDROME WITH LESION OF MINIMAL CHANGE GLOMERULONEPHRITIS: Primary | ICD-10-CM

## 2020-12-23 RX ORDER — PREDNISONE 10 MG/1
40 TABLET ORAL DAILY
Qty: 60 TABLET | Refills: 0
Start: 2020-12-23 | End: 2021-01-15 | Stop reason: SDUPTHER

## 2020-12-24 ENCOUNTER — TELEPHONE (OUTPATIENT)
Dept: NEPHROLOGY | Facility: CLINIC | Age: 45
End: 2020-12-24

## 2021-01-15 DIAGNOSIS — N05.0 MINIMAL CHANGE DISEASE: ICD-10-CM

## 2021-01-15 RX ORDER — PREDNISONE 10 MG/1
40 TABLET ORAL DAILY
Qty: 100 TABLET | Refills: 0 | Status: SHIPPED | OUTPATIENT
Start: 2021-01-15 | End: 2021-03-03 | Stop reason: SDUPTHER

## 2021-01-25 ENCOUNTER — LAB (OUTPATIENT)
Dept: LAB | Age: 46
End: 2021-01-25
Payer: COMMERCIAL

## 2021-01-25 DIAGNOSIS — N04.0 NEPHROTIC SYNDROME WITH LESION OF MINIMAL CHANGE GLOMERULONEPHRITIS: ICD-10-CM

## 2021-01-25 LAB
ALBUMIN SERPL BCP-MCNC: 3.7 G/DL (ref 3.5–5)
ALP SERPL-CCNC: 36 U/L (ref 46–116)
ALT SERPL W P-5'-P-CCNC: 28 U/L (ref 12–78)
ANION GAP SERPL CALCULATED.3IONS-SCNC: 4 MMOL/L (ref 4–13)
AST SERPL W P-5'-P-CCNC: 26 U/L (ref 5–45)
BACTERIA UR QL AUTO: ABNORMAL /HPF
BILIRUB SERPL-MCNC: 1.09 MG/DL (ref 0.2–1)
BILIRUB UR QL STRIP: NEGATIVE
BUN SERPL-MCNC: 9 MG/DL (ref 5–25)
CALCIUM SERPL-MCNC: 9.7 MG/DL (ref 8.3–10.1)
CHLORIDE SERPL-SCNC: 106 MMOL/L (ref 100–108)
CLARITY UR: CLEAR
CO2 SERPL-SCNC: 30 MMOL/L (ref 21–32)
COLOR UR: YELLOW
CREAT SERPL-MCNC: 0.68 MG/DL (ref 0.6–1.3)
CREAT UR-MCNC: <13 MG/DL
ERYTHROCYTE [DISTWIDTH] IN BLOOD BY AUTOMATED COUNT: 12.6 % (ref 11.6–15.1)
GFR SERPL CREATININE-BSD FRML MDRD: 107 ML/MIN/1.73SQ M
GLUCOSE SERPL-MCNC: 127 MG/DL (ref 65–140)
GLUCOSE UR STRIP-MCNC: ABNORMAL MG/DL
HCT VFR BLD AUTO: 42.6 % (ref 34.8–46.1)
HGB BLD-MCNC: 13.7 G/DL (ref 11.5–15.4)
HGB UR QL STRIP.AUTO: NEGATIVE
HYALINE CASTS #/AREA URNS LPF: ABNORMAL /LPF
KETONES UR STRIP-MCNC: NEGATIVE MG/DL
LEUKOCYTE ESTERASE UR QL STRIP: NEGATIVE
MCH RBC QN AUTO: 30.2 PG (ref 26.8–34.3)
MCHC RBC AUTO-ENTMCNC: 32.2 G/DL (ref 31.4–37.4)
MCV RBC AUTO: 94 FL (ref 82–98)
NITRITE UR QL STRIP: NEGATIVE
NON-SQ EPI CELLS URNS QL MICRO: ABNORMAL /HPF
PH UR STRIP.AUTO: 7 [PH]
PLATELET # BLD AUTO: 381 THOUSANDS/UL (ref 149–390)
PMV BLD AUTO: 8.9 FL (ref 8.9–12.7)
POTASSIUM SERPL-SCNC: 3.8 MMOL/L (ref 3.5–5.3)
PROT SERPL-MCNC: 6.9 G/DL (ref 6.4–8.2)
PROT UR STRIP-MCNC: NEGATIVE MG/DL
PROT UR-MCNC: <6 MG/DL
RBC # BLD AUTO: 4.54 MILLION/UL (ref 3.81–5.12)
RBC #/AREA URNS AUTO: ABNORMAL /HPF
SODIUM SERPL-SCNC: 140 MMOL/L (ref 136–145)
SP GR UR STRIP.AUTO: 1.01 (ref 1–1.03)
UROBILINOGEN UR QL STRIP.AUTO: 0.2 E.U./DL
WBC # BLD AUTO: 11.33 THOUSAND/UL (ref 4.31–10.16)
WBC #/AREA URNS AUTO: ABNORMAL /HPF

## 2021-01-25 PROCEDURE — 80053 COMPREHEN METABOLIC PANEL: CPT

## 2021-01-25 PROCEDURE — 85027 COMPLETE CBC AUTOMATED: CPT

## 2021-01-25 PROCEDURE — 81001 URINALYSIS AUTO W/SCOPE: CPT

## 2021-01-25 PROCEDURE — 84156 ASSAY OF PROTEIN URINE: CPT

## 2021-01-25 PROCEDURE — 82570 ASSAY OF URINE CREATININE: CPT

## 2021-01-25 PROCEDURE — 36415 COLL VENOUS BLD VENIPUNCTURE: CPT

## 2021-02-03 ENCOUNTER — TELEMEDICINE (OUTPATIENT)
Dept: NEPHROLOGY | Facility: CLINIC | Age: 46
End: 2021-02-03
Payer: COMMERCIAL

## 2021-02-03 DIAGNOSIS — N04.0 NEPHROTIC SYNDROME WITH LESION OF MINIMAL CHANGE GLOMERULONEPHRITIS: Primary | ICD-10-CM

## 2021-02-03 DIAGNOSIS — E88.09 HYPOALBUMINEMIA: ICD-10-CM

## 2021-02-03 DIAGNOSIS — R80.9 PROTEINURIA, UNSPECIFIED TYPE: ICD-10-CM

## 2021-02-03 DIAGNOSIS — N05.0 MINIMAL CHANGE DISEASE: ICD-10-CM

## 2021-02-03 PROCEDURE — 99214 OFFICE O/P EST MOD 30 MIN: CPT | Performed by: INTERNAL MEDICINE

## 2021-02-03 RX ORDER — TACROLIMUS 1 MG/1
3 CAPSULE ORAL EVERY 12 HOURS SCHEDULED
Qty: 180 CAPSULE | Refills: 4 | Status: SHIPPED | OUTPATIENT
Start: 2021-02-03 | End: 2022-02-03 | Stop reason: SDUPTHER

## 2021-02-03 NOTE — PATIENT INSTRUCTIONS
Start Tacrolimus 3 mg twice a day  Starting Feb 8, 2021, decrease Prednisone to 30 mg daily  Starting Feb 22, 2021, decrease Prednisone to 20 mg daily  Starting Mar 8, 2021, decrease Prednisone to 20 mg every other day  Starting Apr 5, 2021, decrease Prednisone to 10 mg every other day  Stay on Prednisone 10 mg every other day until next follow up  Check labs in 2 weeks and monthly thereafter  Follow up in 3 months

## 2021-02-03 NOTE — PROGRESS NOTES
NEPHROLOGY VIRTUAL VISIT (Video)  Tameka Steinberg 42 y o  female MRN: [id number]  DATE: 02/03/21    Reason for visit: NS due to SHANTEL  Patient with concern for COVID19 exposure; hence, did not want to come in for an actual appointment  ASSESSMENT & PLAN:  1  Nephrotic syndrome due to minimal change disease:  ? Diagnosed in Sep 2019 and responded well to Prednisone - Was treated from 10/1/19 to 4/4/20    ? Had relapse of SHANTEL in May 2020 and restarted Prednisone on 5/18/20  Unfortunately, she had a relapse in Dec 2020 while Prednisone was being titrated down  ? She is basically steroid responsive but frequently relapsing  ? We went over steroid sparing treatment options including cyclophosphamide and CNIs  ? I would try to avoid cyclophosphamide due to serious side effects  I recommend that she start Tacrolimus 3 mg BID    ? Once Tacrolimus is started, she will wean the prednisone as outlined in the instructions  ? We will check Tac levels, renal function, albumin, and urinary protein at frequent intervals over the next 3 months  ? Of note, her relapse occurred at a Prednisone dose of 10 mg QOD       2  Prophylaxis:  · Continue Vitamin D3 1000 units once a day  · Continue Pepcid 20 mg daily       3  Hyperlipidemia:  · Resolved  · Off Atorvastatin since December 2019      Patient Instructions   Start Tacrolimus 3 mg twice a day  Starting Feb 8, 2021, decrease Prednisone to 30 mg daily  Starting Feb 22, 2021, decrease Prednisone to 20 mg daily  Starting Mar 8, 2021, decrease Prednisone to 20 mg every other day  Starting Apr 5, 2021, decrease Prednisone to 10 mg every other day  Stay on Prednisone 10 mg every other day until next follow up  Check labs in 2 weeks and monthly thereafter  Follow up in 3 months  The patient was informed that this was a billable visit and they are aware of it and wish to proceed       Encounter provider Nayely Greene MD    Provider located at [address] [place] [address] [facility]  [facility] [address] [place] [id number]    Recent Visits  No visits were found meeting these conditions  Showing recent visits within past 7 days and meeting all other requirements     Future Appointments  No visits were found meeting these conditions  Showing future appointments within next 150 days and meeting all other requirements      The patient was identified by name and date of birth  Logan Yana was informed that this is a telemedicine visit and that the visit is being conducted through [employer] and patient was informed that this is a secure, HIPAA-compliant platform  She agrees to proceed     My office door was closed  No one else was in the room  She acknowledged consent and understanding of privacy and security of the video platform  The patient has agreed to participate and understands they can discontinue the visit at any time  SUBJECTIVE / INTERVAL HISTORY:  Kaylee Vitale was last seen via telemed in October 2020  During that time, we had asked her to continue weaning down the prednisone  Unfortunately in December 2020, she had a relapse of her nephrotic syndrome due to minimal change disease and required increase in her prednisone  She was on Prednisone 10 mg QOD at the time of the relapse  During that time, her albumin had gone down from 3 9 in October to 3 3 in December 2020  Her UPC went from 0 11 to 4 09  With increase in her steroids, she was able to obtain remission again  Based on blood work from January 25, her albumin has gone up to 3 7 and her proteinuria resolved  She is currently on prednisone 40 mg daily  She does not check home BP  No hospital stays or ER visits  No acute complaints       Past Medical History:   Diagnosis Date    Hyperlipidemia     Nephrotic syndrome      Past Surgical History:   Procedure Laterality Date    IR BIOPSY KIDNEY MASS  9/25/2019    TUBAL LIGATION      WISDOM TOOTH EXTRACTION       ALLERGIES: No Known Allergies    REVIEW OF SYSTEMS:  Review of Systems   Constitutional: Negative for appetite change, chills, fatigue and fever  Respiratory: Negative for cough and shortness of breath  Cardiovascular: Negative for chest pain and leg swelling  Gastrointestinal: Negative for abdominal pain, diarrhea, nausea and vomiting  Genitourinary: Negative for dysuria and hematuria  Musculoskeletal: Negative for arthralgias and back pain  Allergic/Immunologic: Positive for immunocompromised state  Neurological: Negative for dizziness and light-headedness  OBJECTIVE:  Physical Exam  Exam was limited by the quality of the video  BP not checking  General: conscious, coherent and not in acute distress over video  Skin: no visible rash  Eyes: appeared normal  ENT: atraumatic, nose and ears normal externally  Respiratory: talking in full sentences without difficulty, not in respiratory distress  CVS: could not evaluate  GI: could not evaluate  : could not evaluate  MSK: could not evaluate  Neuro: awake, alert and oriented     Psych: appropriate affect     Medications:  Current Outpatient Medications:     famotidine (PEPCID) 20 mg tablet, Take 20 mg by mouth daily, Disp: , Rfl:     predniSONE 10 mg tablet, Take 4 tablets (40 mg total) by mouth daily, Disp: 100 tablet, Rfl: 0    VITAMIN D, CHOLECALCIFEROL, PO, Take by mouth, Disp: , Rfl:     Laboratory Results:  Results for orders placed or performed in visit on 01/25/21   CBC   Result Value Ref Range    WBC 11 33 (H) 4 31 - 10 16 Thousand/uL    RBC 4 54 3 81 - 5 12 Million/uL    Hemoglobin 13 7 11 5 - 15 4 g/dL    Hematocrit 42 6 34 8 - 46 1 %    MCV 94 82 - 98 fL    MCH 30 2 26 8 - 34 3 pg    MCHC 32 2 31 4 - 37 4 g/dL    RDW 12 6 11 6 - 15 1 %    Platelets 804 958 - 392 Thousands/uL    MPV 8 9 8 9 - 12 7 fL   Comprehensive metabolic panel   Result Value Ref Range    Sodium 140 136 - 145 mmol/L    Potassium 3 8 3 5 - 5 3 mmol/L    Chloride 106 100 - 108 mmol/L    CO2 30 21 - 32 mmol/L    ANION GAP 4 4 - 13 mmol/L    BUN 9 5 - 25 mg/dL    Creatinine 0 68 0 60 - 1 30 mg/dL    Glucose 127 65 - 140 mg/dL    Calcium 9 7 8 3 - 10 1 mg/dL    AST 26 5 - 45 U/L    ALT 28 12 - 78 U/L    Alkaline Phosphatase 36 (L) 46 - 116 U/L    Total Protein 6 9 6 4 - 8 2 g/dL    Albumin 3 7 3 5 - 5 0 g/dL    Total Bilirubin 1 09 (H) 0 20 - 1 00 mg/dL    eGFR 107 ml/min/1 73sq m   Protein / creatinine ratio, urine   Result Value Ref Range    Creatinine, Ur <13 0 mg/dL    Protein Urine Random <6 mg/dL    Prot/Creat Ratio, Ur     Urinalysis with microscopic   Result Value Ref Range    Clarity, UA Clear     Color, UA Yellow     Specific Gravity, UA 1 006 1 003 - 1 030    pH, UA 7 0 4 5, 5 0, 5 5, 6 0, 6 5, 7 0, 7 5, 8 0    Glucose,  (1/10%) (A) Negative mg/dl    Ketones, UA Negative Negative mg/dl    Blood, UA Negative Negative    Protein, UA Negative Negative mg/dl    Nitrite, UA Negative Negative    Bilirubin, UA Negative Negative    Urobilinogen, UA 0 2 0 2, 1 0 E U /dl E U /dl    Leukocytes, UA Negative Negative    WBC, UA None Seen None Seen, 2-4 /hpf    RBC, UA None Seen None Seen, 2-4 /hpf    Hyaline Casts, UA None Seen None Seen /lpf    Bacteria, UA None Seen None Seen, Occasional /hpf    Epithelial Cells None Seen None Seen, Occasional /hpf     I spent 23 minutes with the patient during this visit  VIRTUAL VISIT DISCLAIMER    Mary Delacruz acknowledges that she has consented to an online visit or consultation  She understands that the online visit is based solely on information provided by her, and that, in the absence of a face-to-face physical evaluation by the physician, the diagnosis she receives is both limited and provisional in terms of accuracy and completeness  This is not intended to replace a full medical face-to-face evaluation by the physician  Mary Delacruz understands and accepts these terms

## 2021-02-27 ENCOUNTER — LAB (OUTPATIENT)
Dept: LAB | Age: 46
End: 2021-02-27
Payer: COMMERCIAL

## 2021-02-27 DIAGNOSIS — N04.0 NEPHROTIC SYNDROME WITH LESION OF MINIMAL CHANGE GLOMERULONEPHRITIS: ICD-10-CM

## 2021-02-27 LAB
ALBUMIN SERPL BCP-MCNC: 3.6 G/DL (ref 3.5–5)
ANION GAP SERPL CALCULATED.3IONS-SCNC: 3 MMOL/L (ref 4–13)
BUN SERPL-MCNC: 13 MG/DL (ref 5–25)
CALCIUM SERPL-MCNC: 8.6 MG/DL (ref 8.3–10.1)
CHLORIDE SERPL-SCNC: 108 MMOL/L (ref 100–108)
CO2 SERPL-SCNC: 30 MMOL/L (ref 21–32)
CREAT SERPL-MCNC: 0.71 MG/DL (ref 0.6–1.3)
CREAT UR-MCNC: 273 MG/DL
ERYTHROCYTE [DISTWIDTH] IN BLOOD BY AUTOMATED COUNT: 12.7 % (ref 11.6–15.1)
GFR SERPL CREATININE-BSD FRML MDRD: 104 ML/MIN/1.73SQ M
GLUCOSE P FAST SERPL-MCNC: 78 MG/DL (ref 65–99)
HCT VFR BLD AUTO: 43.5 % (ref 34.8–46.1)
HGB BLD-MCNC: 13.7 G/DL (ref 11.5–15.4)
MCH RBC QN AUTO: 29.6 PG (ref 26.8–34.3)
MCHC RBC AUTO-ENTMCNC: 31.5 G/DL (ref 31.4–37.4)
MCV RBC AUTO: 94 FL (ref 82–98)
PHOSPHATE SERPL-MCNC: 3.5 MG/DL (ref 2.7–4.5)
PLATELET # BLD AUTO: 329 THOUSANDS/UL (ref 149–390)
PMV BLD AUTO: 9.1 FL (ref 8.9–12.7)
POTASSIUM SERPL-SCNC: 3.7 MMOL/L (ref 3.5–5.3)
PROT UR-MCNC: 12 MG/DL
PROT/CREAT UR: 0.04 MG/G{CREAT} (ref 0–0.1)
RBC # BLD AUTO: 4.63 MILLION/UL (ref 3.81–5.12)
SODIUM SERPL-SCNC: 141 MMOL/L (ref 136–145)
WBC # BLD AUTO: 12.01 THOUSAND/UL (ref 4.31–10.16)

## 2021-02-27 PROCEDURE — 80069 RENAL FUNCTION PANEL: CPT

## 2021-02-27 PROCEDURE — 80197 ASSAY OF TACROLIMUS: CPT

## 2021-02-27 PROCEDURE — 85027 COMPLETE CBC AUTOMATED: CPT

## 2021-02-27 PROCEDURE — 84156 ASSAY OF PROTEIN URINE: CPT

## 2021-02-27 PROCEDURE — 82570 ASSAY OF URINE CREATININE: CPT

## 2021-02-27 PROCEDURE — 36415 COLL VENOUS BLD VENIPUNCTURE: CPT

## 2021-02-28 LAB — TACROLIMUS BLD-MCNC: 3.2 NG/ML (ref 2–20)

## 2021-03-03 DIAGNOSIS — N05.0 MINIMAL CHANGE DISEASE: ICD-10-CM

## 2021-03-03 RX ORDER — PREDNISONE 10 MG/1
20 TABLET ORAL DAILY
Qty: 90 TABLET | Refills: 0 | Status: SHIPPED | OUTPATIENT
Start: 2021-03-03 | End: 2021-03-07

## 2021-03-07 DIAGNOSIS — N05.0 MINIMAL CHANGE DISEASE: ICD-10-CM

## 2021-03-07 RX ORDER — PREDNISONE 10 MG/1
TABLET ORAL
Qty: 100 TABLET | Refills: 0 | Status: SHIPPED | OUTPATIENT
Start: 2021-03-07 | End: 2021-04-26

## 2021-04-03 ENCOUNTER — APPOINTMENT (OUTPATIENT)
Dept: LAB | Age: 46
End: 2021-04-03
Payer: COMMERCIAL

## 2021-04-03 DIAGNOSIS — N04.0 NEPHROTIC SYNDROME WITH LESION OF MINIMAL CHANGE GLOMERULONEPHRITIS: ICD-10-CM

## 2021-04-03 LAB
ALBUMIN SERPL BCP-MCNC: 3.5 G/DL (ref 3.5–5)
ALP SERPL-CCNC: 30 U/L (ref 46–116)
ALT SERPL W P-5'-P-CCNC: 24 U/L (ref 12–78)
ANION GAP SERPL CALCULATED.3IONS-SCNC: 1 MMOL/L (ref 4–13)
AST SERPL W P-5'-P-CCNC: 17 U/L (ref 5–45)
BILIRUB SERPL-MCNC: 0.81 MG/DL (ref 0.2–1)
BUN SERPL-MCNC: 15 MG/DL (ref 5–25)
CALCIUM SERPL-MCNC: 8.5 MG/DL (ref 8.3–10.1)
CHLORIDE SERPL-SCNC: 113 MMOL/L (ref 100–108)
CO2 SERPL-SCNC: 29 MMOL/L (ref 21–32)
CREAT SERPL-MCNC: 0.74 MG/DL (ref 0.6–1.3)
CREAT UR-MCNC: 195 MG/DL
ERYTHROCYTE [DISTWIDTH] IN BLOOD BY AUTOMATED COUNT: 12.2 % (ref 11.6–15.1)
GFR SERPL CREATININE-BSD FRML MDRD: 99 ML/MIN/1.73SQ M
GLUCOSE P FAST SERPL-MCNC: 85 MG/DL (ref 65–99)
HCT VFR BLD AUTO: 43.1 % (ref 34.8–46.1)
HGB BLD-MCNC: 13.6 G/DL (ref 11.5–15.4)
MCH RBC QN AUTO: 30 PG (ref 26.8–34.3)
MCHC RBC AUTO-ENTMCNC: 31.6 G/DL (ref 31.4–37.4)
MCV RBC AUTO: 95 FL (ref 82–98)
PHOSPHATE SERPL-MCNC: 3.9 MG/DL (ref 2.7–4.5)
PLATELET # BLD AUTO: 315 THOUSANDS/UL (ref 149–390)
PMV BLD AUTO: 8.8 FL (ref 8.9–12.7)
POTASSIUM SERPL-SCNC: 4.3 MMOL/L (ref 3.5–5.3)
PROT SERPL-MCNC: 6.4 G/DL (ref 6.4–8.2)
PROT UR-MCNC: 19 MG/DL
PROT/CREAT UR: 0.1 MG/G{CREAT} (ref 0–0.1)
RBC # BLD AUTO: 4.53 MILLION/UL (ref 3.81–5.12)
SODIUM SERPL-SCNC: 143 MMOL/L (ref 136–145)
WBC # BLD AUTO: 8.81 THOUSAND/UL (ref 4.31–10.16)

## 2021-04-03 PROCEDURE — 80197 ASSAY OF TACROLIMUS: CPT

## 2021-04-03 PROCEDURE — 80053 COMPREHEN METABOLIC PANEL: CPT

## 2021-04-03 PROCEDURE — 85027 COMPLETE CBC AUTOMATED: CPT

## 2021-04-03 PROCEDURE — 84156 ASSAY OF PROTEIN URINE: CPT

## 2021-04-03 PROCEDURE — 84100 ASSAY OF PHOSPHORUS: CPT

## 2021-04-03 PROCEDURE — 82570 ASSAY OF URINE CREATININE: CPT

## 2021-04-03 PROCEDURE — 36415 COLL VENOUS BLD VENIPUNCTURE: CPT

## 2021-04-04 LAB — TACROLIMUS BLD-MCNC: 2.9 NG/ML (ref 2–20)

## 2021-04-26 DIAGNOSIS — N05.0 MINIMAL CHANGE DISEASE: ICD-10-CM

## 2021-04-26 RX ORDER — PREDNISONE 10 MG/1
10 TABLET ORAL EVERY OTHER DAY
Qty: 30 TABLET | Refills: 0 | Status: SHIPPED | OUTPATIENT
Start: 2021-04-26 | End: 2021-09-22

## 2021-05-01 LAB
CREAT ?TM UR-SCNC: 184 UMOL/L
EXT PROTEIN URINE: 8.7
PROT/CREAT UR: 0.05 MG/G{CREAT}

## 2021-06-01 ENCOUNTER — OFFICE VISIT (OUTPATIENT)
Dept: NEPHROLOGY | Facility: CLINIC | Age: 46
End: 2021-06-01
Payer: COMMERCIAL

## 2021-06-01 VITALS
HEIGHT: 66 IN | BODY MASS INDEX: 20.25 KG/M2 | WEIGHT: 126 LBS | SYSTOLIC BLOOD PRESSURE: 132 MMHG | DIASTOLIC BLOOD PRESSURE: 78 MMHG

## 2021-06-01 DIAGNOSIS — R80.9 PROTEINURIA, UNSPECIFIED TYPE: ICD-10-CM

## 2021-06-01 DIAGNOSIS — N04.0 NEPHROTIC SYNDROME WITH LESION OF MINIMAL CHANGE GLOMERULONEPHRITIS: ICD-10-CM

## 2021-06-01 DIAGNOSIS — N05.0 MINIMAL CHANGE DISEASE: Primary | ICD-10-CM

## 2021-06-01 PROCEDURE — 3008F BODY MASS INDEX DOCD: CPT | Performed by: INTERNAL MEDICINE

## 2021-06-01 PROCEDURE — 99214 OFFICE O/P EST MOD 30 MIN: CPT | Performed by: INTERNAL MEDICINE

## 2021-06-01 PROCEDURE — 3008F BODY MASS INDEX DOCD: CPT | Performed by: FAMILY MEDICINE

## 2021-06-01 NOTE — PROGRESS NOTES
NEPHROLOGY OFFICE PROGRESS NOTE   Justa Rose 42 y o  female MRN: [id number]  DATE: 06/01/21  Reason for visit: Continued evaluation of nephrotic syndrome    ASSESSMENT & PLAN:   1  Nephrotic syndrome due to minimal change disease:  · Diagnosed in Sep 2019 and responded well to Prednisone - Was treated from 10/1/19 to 4/4/20  · Had relapse of SHANTEL in May 2020 and restarted Prednisone on 5/18/20  Unfortunately, she had a relapse in Dec 2020 while Prednisone was being titrated down  · She has steroid responsive but frequently relapsing disease  · As such, we started Tacrolimus 3 mg BID on 2/3/21 in order to get her off the Prednisone  · She is doing well with Tacrolimus and is down to Prednisone 10 mg QOD  · Her labs indicate continued remission without evidence of relapse  Her most recent UPC ratio is 0 05 and her albumin is normal at 3 5  · I asked her to decrease Prednisone down to 5 mg QOD and stop it altogether after 2 weeks  · We will continue with Tacrolimus for at least 18-24 months       2  Prophylaxis:  · Continue Vitamin D3 1000 units once a day  · May stop Pepcid once off Prednisone  3  Hyperlipidemia:  · Resolved  · Off Atorvastatin since December 2019        Patient Instructions   Decrease Prednisone to 5 mg every other day and stop after 2 weeks  Continue with Tacrolimus 3 mg twice a day  Check blood and urine tests in July 2021  Follow up in 4 months  SUBJECTIVE / INTERVAL HISTORY:  Nery Heller was last seen via telemed in Feb 2021  During that time, we started Tacrolimus and planned for a prednisone taper  Since then, she has been doing fairly well  She is tolerating the tacrolimus well and has worked on tapering the prednisone  She is down the prednisone 10 mg every other day  She denies any acute complaints at this time  No hospital stays or ER visits  PMH/PSH: SHANETL  Previous work up:   September 25, 2019 Renal biopsy:  1   Minimal change disease  2  Arteriosclerosis, mild to moderate  9/23/19: ZAYNAB negative, KL ratio 0 88, UPEP No M spike, SPEP No M spike, Hep B and C NR, C3 127, C4 30, ANCA negative, anti PLA2R Ab negative    9/23/19 Renal US: R 11 8 cm, L 11 4 cm, no hydronephrosis  ALLERGIES: No Known Allergies    REVIEW OF SYSTEMS:  Review of Systems   Constitutional: Negative for appetite change, chills, fatigue and fever  Respiratory: Negative for cough and shortness of breath  Cardiovascular: Negative for chest pain and leg swelling  Gastrointestinal: Negative for abdominal pain, diarrhea, nausea and vomiting  Genitourinary: Negative for dysuria and hematuria  Musculoskeletal: Negative for arthralgias and back pain  Skin: Negative for rash  Allergic/Immunologic: Positive for immunocompromised state  Neurological: Negative for dizziness and light-headedness  Psychiatric/Behavioral: Negative for dysphoric mood  The patient is not nervous/anxious  OBJECTIVE:  /78   Ht 5' 6" (1 676 m)   Wt 57 2 kg (126 lb)   BMI 20 34 kg/m²   Current Weight:   Body mass index is 20 34 kg/m²  Physical Exam  Constitutional:       General: She is not in acute distress  Appearance: Normal appearance  She is well-developed and normal weight  She is not ill-appearing or diaphoretic  HENT:      Head: Normocephalic and atraumatic  Eyes:      General: No scleral icterus  Conjunctiva/sclera: Conjunctivae normal    Neck:      Vascular: No JVD  Cardiovascular:      Rate and Rhythm: Normal rate and regular rhythm  Heart sounds: Normal heart sounds  Pulmonary:      Effort: Pulmonary effort is normal       Breath sounds: Normal breath sounds  Abdominal:      General: Bowel sounds are normal       Palpations: Abdomen is soft  Musculoskeletal:      Right lower leg: No edema  Left lower leg: No edema  Skin:     General: Skin is warm and dry     Neurological:      Mental Status: She is alert and oriented to person, place, and time  Psychiatric:         Mood and Affect: Mood normal          Behavior: Behavior normal  Behavior is cooperative  Judgment: Judgment normal        Medications:  Current Outpatient Medications:     famotidine (PEPCID) 20 mg tablet, Take 20 mg by mouth daily, Disp: , Rfl:     predniSONE 10 mg tablet, Take 1 tablet (10 mg total) by mouth every other day, Disp: 30 tablet, Rfl: 0    tacrolimus (PROGRAF) 1 mg capsule, Take 3 capsules (3 mg total) by mouth every 12 (twelve) hours, Disp: 180 capsule, Rfl: 4    VITAMIN D, CHOLECALCIFEROL, PO, Take by mouth, Disp: , Rfl:     Laboratory Results:  Results for orders placed or performed in visit on 05/01/21   Protein / creatinine ratio, urine   Result Value Ref Range    PROTEIN UA 8 7     EXT Creatinine Urine 184 0     EXTERNAL Ur Prot/Creat Ratio 0 05      May 1, 2021:  Glucose 80, BUN 13, creatinine 0 65, sodium 143, potassium 4 2, chloride 111, carbon dioxide 28, calcium 8 5, albumin 3 5, alkaline phosphatase 29, total protein 6 1, magnesium 2 0, phosphorus 3 5, hemoglobin 12 7, WBC 6 6, platelets 017

## 2021-06-01 NOTE — PATIENT INSTRUCTIONS
Decrease Prednisone to 5 mg every other day and stop after 2 weeks  Continue with Tacrolimus 3 mg twice a day  Check blood and urine tests in July 2021  Follow up in 4 months

## 2021-06-09 ENCOUNTER — OFFICE VISIT (OUTPATIENT)
Dept: FAMILY MEDICINE CLINIC | Facility: CLINIC | Age: 46
End: 2021-06-09

## 2021-06-09 VITALS
TEMPERATURE: 99.7 F | OXYGEN SATURATION: 99 % | HEART RATE: 104 BPM | SYSTOLIC BLOOD PRESSURE: 118 MMHG | RESPIRATION RATE: 16 BRPM | DIASTOLIC BLOOD PRESSURE: 80 MMHG

## 2021-06-09 DIAGNOSIS — L73.9 FOLLICULITIS: Primary | ICD-10-CM

## 2021-06-09 PROCEDURE — 99214 OFFICE O/P EST MOD 30 MIN: CPT | Performed by: FAMILY MEDICINE

## 2021-06-09 RX ORDER — CEPHALEXIN 500 MG/1
500 CAPSULE ORAL 3 TIMES DAILY
Qty: 21 CAPSULE | Refills: 0 | Status: SHIPPED | OUTPATIENT
Start: 2021-06-09 | End: 2021-06-16

## 2021-06-09 NOTE — PROGRESS NOTES
Assessment/Plan:    Folliculitis  Likely a sebaceous cyst that became infected given long-standing history of "lump" in area, hx of plucking hair and irritation of area  - keflex 500 mg TID x7d   - continue warm compresses, sitz baths, supportive care  - avoid irritation to area including shaving, plucking hair, poking and prodding as pt has been doing   - f/u 2 weeks if needed to monitor improvement or worsening  - return to care precautions discussed  - if lump remains, if not bothering, can leave alone and watch but if persistent and pt would like can consider removing - given location and immunosuppressant history would refer to general surgery for in office removal      Diagnoses and all orders for this visit:    Folliculitis  -     cephalexin (KEFLEX) 500 mg capsule; Take 1 capsule (500 mg total) by mouth 3 (three) times a day for 7 days        Subjective:      Patient ID: Robles Lipscomb is a 42 y o  female  HPI  41 yo female presenting with possible ingrown hair of groin region  Hx minimal change disease and nephrotic syndrome on steroids and tacrolimus  Has has a lump that she thinks is an ingrown hair present for about a year  Has been doing supportive measures with cleaning and warm compresses  Now worsening  Recently plucked all hairs in the area and now the area is very red and painful and pt would like help  No drainage or bleeding  Thinks the lump has enlarged as well  Has been using neosporin and bandaids for the last 2 days that is not helping  Of note, has had a history of sebaceous cyst on her scalp that self resolved  Otherwise no other symptoms or concerns  The following portions of the patient's history were reviewed and updated as appropriate: allergies, current medications, past family history, past medical history, past social history, past surgical history and problem list     Review of Systems   Constitutional: Negative for chills and fever     HENT: Positive for congestion  Negative for rhinorrhea  Respiratory: Negative for cough and shortness of breath  Cardiovascular: Negative for chest pain and palpitations  Gastrointestinal: Negative for abdominal pain, diarrhea and nausea  Genitourinary: Negative for difficulty urinating  Musculoskeletal: Negative for back pain  Skin: Positive for rash and wound  Neurological: Negative for dizziness, light-headedness and headaches  Psychiatric/Behavioral: Negative for agitation and confusion  Objective:      /80   Pulse 104   Temp 99 7 °F (37 6 °C) (Tympanic)   Resp 16   SpO2 99%          Physical Exam  Vitals signs reviewed  Exam conducted with a chaperone present  Constitutional:       General: She is not in acute distress  Appearance: She is well-developed  HENT:      Head: Normocephalic and atraumatic  Eyes:      Pupils: Pupils are equal, round, and reactive to light  Neck:      Musculoskeletal: Normal range of motion and neck supple  Cardiovascular:      Rate and Rhythm: Normal rate and regular rhythm  Heart sounds: Normal heart sounds  No murmur  Pulmonary:      Effort: Pulmonary effort is normal  No respiratory distress  Breath sounds: Normal breath sounds  No wheezing or rales  Abdominal:      General: Bowel sounds are normal  There is no distension  Palpations: Abdomen is soft  Tenderness: There is no abdominal tenderness  Genitourinary:     Comments: Fluctuant 2cm x 2cm cyst-like area with induration and surrounding erythema  Musculoskeletal: Normal range of motion  General: No tenderness or deformity  Skin:     General: Skin is warm and dry  Findings: No rash  Neurological:      Mental Status: She is alert and oriented to person, place, and time     Psychiatric:         Behavior: Behavior normal          Dom Garcia MD, PGY1   [facility]  Date: 6/9/2021 Time: 4:53 PM

## 2021-06-09 NOTE — PATIENT INSTRUCTIONS
Folliculitis   WHAT YOU NEED TO KNOW:   Folliculitis is inflammation of your hair follicles  A hair follicle is a sac under your skin  Your hair grows out of the follicle  Folliculitis is caused by bacteria or fungus, most commonly a germ called Staph  Folliculitis can occur anywhere you have hair  DISCHARGE INSTRUCTIONS:   Medicines:   · Antibiotics: This medicine is given to fight or prevent an infection caused by bacteria  It may be given as an ointment that you apply to your skin or as a pill  Always take your antibiotics exactly as ordered by your healthcare provider  Never save antibiotics or take leftover antibiotics that were given to you for another illness  · Antifungal medicine: This medicine helps kill fungus that may be causing your folliculitis  It may be given as an cream that you apply to your skin or as a pill  · NSAIDs , such as ibuprofen, help decrease swelling, pain, and fever  This medicine is available with or without a doctor's order  NSAIDs can cause stomach bleeding or kidney problems in certain people  If you take blood thinner medicine, always ask if NSAIDs are safe for you  Always read the medicine label and follow directions  Do not give these medicines to children under 10months of age without direction from your child's healthcare provider  · Antihistamines: This medicine may be given to help decrease itching  · Take your medicine as directed  Contact your healthcare provider if you think your medicine is not helping or if you have side effects  Tell him of her if you are allergic to any medicine  Keep a list of the medicines, vitamins, and herbs you take  Include the amounts, and when and why you take them  Bring the list or the pill bottles to follow-up visits  Carry your medicine list with you in case of an emergency      Follow up with your healthcare provider or dermatologist as directed:  Write down your questions so you remember to ask them during your visits  Manage folliculitis:   · Use warm compresses:  Wet a washcloth with warm water and apply it to the infected skin area to help decrease pain and swelling  Warm compresses may also help drain pus and improve healing  · Clean the area:  Use antibacterial soap to wash the affected area  Change your washcloths and towels every day  · Avoid shaving the area: If possible, do not shave areas that have folliculitis  If you must shave, use an electric razor or new blade every time you shave  Prevent folliculitis:   · Do not share personal items:  Do not share towels, soap, or any personal items with other people  · Do not wear tight clothing:  Do not wear tight-fitting clothes that rub against and irritate your skin  · Treat skin injuries right away:  Treat injuries such as cuts and scrapes right away  Wash them with warm, soapy water, and cover the area to prevent infection  Contact your healthcare provider or dermatologist if:  · You have a fever  · You have foul-smelling pus coming from the bumps on your skin  · Your rash is spreading  · You have questions or concerns about your condition or care  Return to the emergency department if:  · You develop large areas of red, warm, tender skin around the folliculitis  · You develop boils  © Copyright [address] Information is for End User's use only and may not be sold, redistributed or otherwise used for commercial purposes  All illustrations and images included in CareNotes® are the copyrighted property of A D A M [employer] Inc  or [facility] Laurita Chris   The above information is an  only  It is not intended as medical advice for individual conditions or treatments  Talk to your doctor, nurse or pharmacist before following any medical regimen to see if it is safe and effective for you

## 2021-06-09 NOTE — ASSESSMENT & PLAN NOTE
Likely a sebaceous cyst that became infected given long-standing history of "lump" in area, hx of plucking hair and irritation of area  - keflex 500 mg TID x7d   - continue warm compresses, sitz baths, supportive care  - avoid irritation to area including shaving, plucking hair, poking and prodding as pt has been doing   - f/u 2 weeks if needed to monitor improvement or worsening  - return to care precautions discussed  - if lump remains, if not bothering, can leave alone and watch but if persistent and pt would like can consider removing - given location and immunosuppressant history would refer to general surgery for in office removal

## 2021-07-08 ENCOUNTER — TELEPHONE (OUTPATIENT)
Dept: NEPHROLOGY | Facility: CLINIC | Age: 46
End: 2021-07-08

## 2021-07-08 NOTE — TELEPHONE ENCOUNTER
I spoke with patient and informed renal function and urine protein levels are stable  Patient aware to have repeat lab/urine studies prior to follow up appointment  Patient verbalized understanding with no questions or concerns at this time

## 2021-07-08 NOTE — TELEPHONE ENCOUNTER
----- Message from Evelin Toure MD sent at 7/7/2021  5:11 PM EDT -----  Please inform patient that renal function and urine protein levels are stable

## 2021-09-11 ENCOUNTER — APPOINTMENT (OUTPATIENT)
Dept: LAB | Facility: CLINIC | Age: 46
End: 2021-09-11
Payer: COMMERCIAL

## 2021-09-11 DIAGNOSIS — N05.0 MINIMAL CHANGE DISEASE: ICD-10-CM

## 2021-09-11 LAB
ALBUMIN SERPL BCP-MCNC: 3.7 G/DL (ref 3.5–5)
ALP SERPL-CCNC: 50 U/L (ref 46–116)
ALT SERPL W P-5'-P-CCNC: 29 U/L (ref 12–78)
ANION GAP SERPL CALCULATED.3IONS-SCNC: 5 MMOL/L (ref 4–13)
AST SERPL W P-5'-P-CCNC: 21 U/L (ref 5–45)
BACTERIA UR QL AUTO: ABNORMAL /HPF
BILIRUB SERPL-MCNC: 1.02 MG/DL (ref 0.2–1)
BILIRUB UR QL STRIP: NEGATIVE
BUN SERPL-MCNC: 13 MG/DL (ref 5–25)
CALCIUM SERPL-MCNC: 9 MG/DL (ref 8.3–10.1)
CHLORIDE SERPL-SCNC: 107 MMOL/L (ref 100–108)
CLARITY UR: CLEAR
CO2 SERPL-SCNC: 30 MMOL/L (ref 21–32)
COLOR UR: YELLOW
CREAT SERPL-MCNC: 0.61 MG/DL (ref 0.6–1.3)
CREAT UR-MCNC: 184 MG/DL
ERYTHROCYTE [DISTWIDTH] IN BLOOD BY AUTOMATED COUNT: 12.1 % (ref 11.6–15.1)
GFR SERPL CREATININE-BSD FRML MDRD: 111 ML/MIN/1.73SQ M
GLUCOSE P FAST SERPL-MCNC: 74 MG/DL (ref 65–99)
GLUCOSE UR STRIP-MCNC: NEGATIVE MG/DL
HCT VFR BLD AUTO: 40.9 % (ref 34.8–46.1)
HGB BLD-MCNC: 13 G/DL (ref 11.5–15.4)
HGB UR QL STRIP.AUTO: NEGATIVE
KETONES UR STRIP-MCNC: NEGATIVE MG/DL
LEUKOCYTE ESTERASE UR QL STRIP: NEGATIVE
MAGNESIUM SERPL-MCNC: 1.9 MG/DL (ref 1.6–2.6)
MCH RBC QN AUTO: 29.5 PG (ref 26.8–34.3)
MCHC RBC AUTO-ENTMCNC: 31.8 G/DL (ref 31.4–37.4)
MCV RBC AUTO: 93 FL (ref 82–98)
MUCOUS THREADS UR QL AUTO: ABNORMAL
NITRITE UR QL STRIP: NEGATIVE
NON-SQ EPI CELLS URNS QL MICRO: ABNORMAL /HPF
PH UR STRIP.AUTO: 6 [PH]
PHOSPHATE SERPL-MCNC: 3.3 MG/DL (ref 2.7–4.5)
PLATELET # BLD AUTO: 348 THOUSANDS/UL (ref 149–390)
PMV BLD AUTO: 9.2 FL (ref 8.9–12.7)
POTASSIUM SERPL-SCNC: 4.4 MMOL/L (ref 3.5–5.3)
PROT SERPL-MCNC: 7.1 G/DL (ref 6.4–8.2)
PROT UR STRIP-MCNC: NEGATIVE MG/DL
PROT UR-MCNC: 13 MG/DL
PROT/CREAT UR: 0.07 MG/G{CREAT} (ref 0–0.1)
RBC # BLD AUTO: 4.41 MILLION/UL (ref 3.81–5.12)
RBC #/AREA URNS AUTO: ABNORMAL /HPF
SODIUM SERPL-SCNC: 142 MMOL/L (ref 136–145)
SP GR UR STRIP.AUTO: 1.02 (ref 1–1.03)
UROBILINOGEN UR QL STRIP.AUTO: 0.2 E.U./DL
WBC # BLD AUTO: 6.11 THOUSAND/UL (ref 4.31–10.16)
WBC #/AREA URNS AUTO: ABNORMAL /HPF

## 2021-09-11 PROCEDURE — 83735 ASSAY OF MAGNESIUM: CPT

## 2021-09-11 PROCEDURE — 82306 VITAMIN D 25 HYDROXY: CPT

## 2021-09-11 PROCEDURE — 36415 COLL VENOUS BLD VENIPUNCTURE: CPT

## 2021-09-11 PROCEDURE — 82570 ASSAY OF URINE CREATININE: CPT

## 2021-09-11 PROCEDURE — 84100 ASSAY OF PHOSPHORUS: CPT

## 2021-09-11 PROCEDURE — 84156 ASSAY OF PROTEIN URINE: CPT

## 2021-09-11 PROCEDURE — 81001 URINALYSIS AUTO W/SCOPE: CPT

## 2021-09-11 PROCEDURE — 85027 COMPLETE CBC AUTOMATED: CPT

## 2021-09-11 PROCEDURE — 80053 COMPREHEN METABOLIC PANEL: CPT

## 2021-09-12 LAB — 25(OH)D3 SERPL-MCNC: 42.3 NG/ML (ref 30–100)

## 2021-09-22 ENCOUNTER — OFFICE VISIT (OUTPATIENT)
Dept: NEPHROLOGY | Facility: CLINIC | Age: 46
End: 2021-09-22
Payer: COMMERCIAL

## 2021-09-22 VITALS
BODY MASS INDEX: 19.29 KG/M2 | DIASTOLIC BLOOD PRESSURE: 74 MMHG | WEIGHT: 120 LBS | SYSTOLIC BLOOD PRESSURE: 112 MMHG | HEIGHT: 66 IN

## 2021-09-22 DIAGNOSIS — N04.0 NEPHROTIC SYNDROME WITH LESION OF MINIMAL CHANGE GLOMERULONEPHRITIS: ICD-10-CM

## 2021-09-22 DIAGNOSIS — E88.09 HYPOALBUMINEMIA: ICD-10-CM

## 2021-09-22 DIAGNOSIS — R80.9 PROTEINURIA, UNSPECIFIED TYPE: ICD-10-CM

## 2021-09-22 DIAGNOSIS — N05.0 MINIMAL CHANGE DISEASE: Primary | ICD-10-CM

## 2021-09-22 PROCEDURE — 99214 OFFICE O/P EST MOD 30 MIN: CPT | Performed by: INTERNAL MEDICINE

## 2021-09-22 PROCEDURE — 3008F BODY MASS INDEX DOCD: CPT | Performed by: INTERNAL MEDICINE

## 2021-09-22 NOTE — LETTER
September 22, 2021     [place] [facility], [address]     Patient: Dajuan Reveles   YOB: 1975   Date of Visit: 9/22/2021       Dear Dr Elizabet Quintero: Thank you for referring Trinity Barnhart to me for evaluation  Below are my notes for this consultation  If you have questions, please do not hesitate to call me  I look forward to following your patient along with you  Sincerely,        Aubrie Martins MD        CC: No Recipients  Aubrie Martins MD  9/22/2021  2:27 PM  Sign when Signing Visit  NEPHROLOGY OFFICE PROGRESS NOTE   Irl Labrum [address] 42 y o  female MRN: [id number]  DATE: 09/22/21  Reason for visit: Continued evaluation of nephrotic syndrome    ASSESSMENT & PLAN:   1  Nephrotic syndrome due to minimal change disease:  · Summary: Diagnosed in Sep 2019 and responded well to Prednisone - Was treated from 10/1/19 to 4/4/20  Had relapse of SHANTEL in May 2020 and restarted Prednisone on 5/18/20  Unfortunately, she had a relapse in Dec 2020 while Prednisone was being titrated down  Prednisone was increased and Tacrolimus was added on 2/3/21  · Since then, Prednisone has been weaned off - has been off since June 2021  · She remains in remission with Tacrolimus 3 mg BID  · Albumin is normal  No evidence of proteinuria on UPC  · Continue with Tacrolimus at same dose  Plan to wean with next visit       2  Prophylaxis:  · Continue Vitamin D3 1000 units once a day  Vitamin D is normal      3  Hyperlipidemia:  · Resolved  · Off Atorvastatin since December 2019      Patient Instructions   Your kidney function is stable  There is no protein in the urine at this time  Your doing quite well  I recommend no change to your medications today  Check urine test in mid Nov 2021  Follow up in 5 months - please obtain blood work 1-2 weeks prior to the appointment  SUBJECTIVE / INTERVAL HISTORY:  Lamonte Ball was last seen in June 2021  Doing well overall    She is now off prednisone  Tolerating tacrolimus  She recently had folliculitis and required Keflex  Now better  No recent hospitalizations or ER visits  No acute complaints at this time  PMH/PSH: SHANTEL  Previous work up:   September 25, 2019 Renal biopsy:  1  Minimal change disease  2  Arteriosclerosis, mild to moderate  9/23/19: ZAYNAB negative, KL ratio 0 88, UPEP No M spike, SPEP No M spike, Hep B and C NR, C3 127, C4 30, ANCA negative, anti PLA2R Ab negative    9/23/19 Renal US: R 11 8 cm, L 11 4 cm, no hydronephrosis  ALLERGIES: No Known Allergies    REVIEW OF SYSTEMS:  Review of Systems   Constitutional: Negative for appetite change, chills, fatigue and fever  Respiratory: Negative for cough and shortness of breath  Cardiovascular: Negative for chest pain and leg swelling  Gastrointestinal: Negative for abdominal pain, diarrhea, nausea and vomiting  Genitourinary: Negative for dysuria and hematuria  Musculoskeletal: Negative for arthralgias and back pain  Neurological: Negative for dizziness and light-headedness  OBJECTIVE:  /74   Ht 5' 6" (1 676 m)   Wt 54 4 kg (120 lb)   BMI 19 37 kg/m²   Current Weight:   Body mass index is 19 37 kg/m²  Physical Exam  Constitutional:       General: She is not in acute distress  Appearance: Normal appearance  She is well-developed and normal weight  She is not ill-appearing or diaphoretic  HENT:      Head: Normocephalic and atraumatic  Eyes:      General: No scleral icterus  Conjunctiva/sclera: Conjunctivae normal    Neck:      Vascular: No JVD  Cardiovascular:      Rate and Rhythm: Normal rate and regular rhythm  Heart sounds: Normal heart sounds  Pulmonary:      Effort: Pulmonary effort is normal       Breath sounds: Normal breath sounds  Abdominal:      General: Bowel sounds are normal       Palpations: Abdomen is soft  Musculoskeletal:      Right lower leg: No edema  Left lower leg: No edema     Skin: General: Skin is warm  Neurological:      Mental Status: She is alert and oriented to person, place, and time  Psychiatric:         Mood and Affect: Mood normal          Behavior: Behavior normal  Behavior is cooperative           Judgment: Judgment normal        Medications:  Current Outpatient Medications:     tacrolimus (PROGRAF) 1 mg capsule, Take 3 capsules (3 mg total) by mouth every 12 (twelve) hours, Disp: 180 capsule, Rfl: 4    VITAMIN D, CHOLECALCIFEROL, PO, Take by mouth, Disp: , Rfl:     famotidine (PEPCID) 20 mg tablet, Take 20 mg by mouth daily (Patient not taking: Reported on 9/22/2021), Disp: , Rfl:     Laboratory Results:  Results for orders placed or performed in visit on 09/11/21   Comprehensive metabolic panel   Result Value Ref Range    Sodium 142 136 - 145 mmol/L    Potassium 4 4 3 5 - 5 3 mmol/L    Chloride 107 100 - 108 mmol/L    CO2 30 21 - 32 mmol/L    ANION GAP 5 4 - 13 mmol/L    BUN 13 5 - 25 mg/dL    Creatinine 0 61 0 60 - 1 30 mg/dL    Glucose, Fasting 74 65 - 99 mg/dL    Calcium 9 0 8 3 - 10 1 mg/dL    AST 21 5 - 45 U/L    ALT 29 12 - 78 U/L    Alkaline Phosphatase 50 46 - 116 U/L    Total Protein 7 1 6 4 - 8 2 g/dL    Albumin 3 7 3 5 - 5 0 g/dL    Total Bilirubin 1 02 (H) 0 20 - 1 00 mg/dL    eGFR 111 ml/min/1 73sq m   Protein / creatinine ratio, urine   Result Value Ref Range    Creatinine, Ur 184 0 mg/dL    Protein Urine Random 13 mg/dL    Prot/Creat Ratio, Ur 0 07 0 00 - 0 10   CBC   Result Value Ref Range    WBC 6 11 4 31 - 10 16 Thousand/uL    RBC 4 41 3 81 - 5 12 Million/uL    Hemoglobin 13 0 11 5 - 15 4 g/dL    Hematocrit 40 9 34 8 - 46 1 %    MCV 93 82 - 98 fL    MCH 29 5 26 8 - 34 3 pg    MCHC 31 8 31 4 - 37 4 g/dL    RDW 12 1 11 6 - 15 1 %    Platelets 711 417 - 511 Thousands/uL    MPV 9 2 8 9 - 12 7 fL   Urinalysis with microscopic   Result Value Ref Range    Clarity, UA Clear     Color, UA Yellow     Specific Gravity, UA 1 025 1 003 - 1 030    pH, UA 6 0 4 5, 5 0, 5  5, 6 0, 6 5, 7 0, 7 5, 8 0    Glucose, UA Negative Negative mg/dl    Ketones, UA Negative Negative mg/dl    Blood, UA Negative Negative    Protein, UA Negative Negative mg/dl    Nitrite, UA Negative Negative    Bilirubin, UA Negative Negative    Urobilinogen, UA 0 2 0 2, 1 0 E U /dl E U /dl    Leukocytes, UA Negative Negative    WBC, UA None Seen None Seen, 2-4, 5-60 /hpf    RBC, UA 0-1 (A) None Seen, 2-4 /hpf    Bacteria, UA Occasional None Seen, Occasional /hpf    Epithelial Cells Occasional None Seen, Occasional /hpf    MUCUS THREADS Moderate (A) None Seen   Magnesium   Result Value Ref Range    Magnesium 1 9 1 6 - 2 6 mg/dL   Phosphorus   Result Value Ref Range    Phosphorus 3 3 2 7 - 4 5 mg/dL   Vitamin D 25 hydroxy   Result Value Ref Range    Vit D, 25-Hydroxy 42 3 30 0 - 100 0 ng/mL

## 2021-09-22 NOTE — PATIENT INSTRUCTIONS
Your kidney function is stable  There is no protein in the urine at this time  Your doing quite well  I recommend no change to your medications today  Check urine test in mid Nov 2021  Follow up in 5 months - please obtain blood work 1-2 weeks prior to the appointment

## 2021-09-22 NOTE — PROGRESS NOTES
NEPHROLOGY OFFICE PROGRESS NOTE   Inessa Capellan 42 y o  female MRN: [id number]  DATE: 09/22/21  Reason for visit: Continued evaluation of nephrotic syndrome    ASSESSMENT & PLAN:   1  Nephrotic syndrome due to minimal change disease:  · Summary: Diagnosed in Sep 2019 and responded well to Prednisone - Was treated from 10/1/19 to 4/4/20  Had relapse of SHANTEL in May 2020 and restarted Prednisone on 5/18/20  Unfortunately, she had a relapse in Dec 2020 while Prednisone was being titrated down  Prednisone was increased and Tacrolimus was added on 2/3/21  · Since then, Prednisone has been weaned off - has been off since June 2021  · She remains in remission with Tacrolimus 3 mg BID  · Albumin is normal  No evidence of proteinuria on UPC  · Continue with Tacrolimus at same dose  Plan to wean with next visit       2  Prophylaxis:  · Continue Vitamin D3 1000 units once a day  Vitamin D is normal      3  Hyperlipidemia:  · Resolved  · Off Atorvastatin since December 2019      Patient Instructions   Your kidney function is stable  There is no protein in the urine at this time  Your doing quite well  I recommend no change to your medications today  Check urine test in mid Nov 2021  Follow up in 5 months - please obtain blood work 1-2 weeks prior to the appointment  SUBJECTIVE / INTERVAL HISTORY:  Michael Borja was last seen in June 2021  Doing well overall  She is now off prednisone  Tolerating tacrolimus  She recently had folliculitis and required Keflex  Now better  No recent hospitalizations or ER visits  No acute complaints at this time  PMH/PSH: SHANTEL  Previous work up:   September 25, 2019 Renal biopsy:  1  Minimal change disease  2  Arteriosclerosis, mild to moderate  9/23/19: ZAYNAB negative, KL ratio 0 88, UPEP No M spike, SPEP No M spike, Hep B and C NR, C3 127, C4 30, ANCA negative, anti PLA2R Ab negative    9/23/19 Renal US: R 11 8 cm, L 11 4 cm, no hydronephrosis  ALLERGIES: No Known Allergies    REVIEW OF SYSTEMS:  Review of Systems   Constitutional: Negative for appetite change, chills, fatigue and fever  Respiratory: Negative for cough and shortness of breath  Cardiovascular: Negative for chest pain and leg swelling  Gastrointestinal: Negative for abdominal pain, diarrhea, nausea and vomiting  Genitourinary: Negative for dysuria and hematuria  Musculoskeletal: Negative for arthralgias and back pain  Neurological: Negative for dizziness and light-headedness  OBJECTIVE:  /74   Ht 5' 6" (1 676 m)   Wt 54 4 kg (120 lb)   BMI 19 37 kg/m²   Current Weight:   Body mass index is 19 37 kg/m²  Physical Exam  Constitutional:       General: She is not in acute distress  Appearance: Normal appearance  She is well-developed and normal weight  She is not ill-appearing or diaphoretic  HENT:      Head: Normocephalic and atraumatic  Eyes:      General: No scleral icterus  Conjunctiva/sclera: Conjunctivae normal    Neck:      Vascular: No JVD  Cardiovascular:      Rate and Rhythm: Normal rate and regular rhythm  Heart sounds: Normal heart sounds  Pulmonary:      Effort: Pulmonary effort is normal       Breath sounds: Normal breath sounds  Abdominal:      General: Bowel sounds are normal       Palpations: Abdomen is soft  Musculoskeletal:      Right lower leg: No edema  Left lower leg: No edema  Skin:     General: Skin is warm  Neurological:      Mental Status: She is alert and oriented to person, place, and time  Psychiatric:         Mood and Affect: Mood normal          Behavior: Behavior normal  Behavior is cooperative           Judgment: Judgment normal        Medications:  Current Outpatient Medications:     tacrolimus (PROGRAF) 1 mg capsule, Take 3 capsules (3 mg total) by mouth every 12 (twelve) hours, Disp: 180 capsule, Rfl: 4    VITAMIN D, CHOLECALCIFEROL, PO, Take by mouth, Disp: , Rfl:     famotidine (PEPCID) 20 mg tablet, Take 20 mg by mouth daily (Patient not taking: Reported on 9/22/2021), Disp: , Rfl:     Laboratory Results:  Results for orders placed or performed in visit on 09/11/21   Comprehensive metabolic panel   Result Value Ref Range    Sodium 142 136 - 145 mmol/L    Potassium 4 4 3 5 - 5 3 mmol/L    Chloride 107 100 - 108 mmol/L    CO2 30 21 - 32 mmol/L    ANION GAP 5 4 - 13 mmol/L    BUN 13 5 - 25 mg/dL    Creatinine 0 61 0 60 - 1 30 mg/dL    Glucose, Fasting 74 65 - 99 mg/dL    Calcium 9 0 8 3 - 10 1 mg/dL    AST 21 5 - 45 U/L    ALT 29 12 - 78 U/L    Alkaline Phosphatase 50 46 - 116 U/L    Total Protein 7 1 6 4 - 8 2 g/dL    Albumin 3 7 3 5 - 5 0 g/dL    Total Bilirubin 1 02 (H) 0 20 - 1 00 mg/dL    eGFR 111 ml/min/1 73sq m   Protein / creatinine ratio, urine   Result Value Ref Range    Creatinine, Ur 184 0 mg/dL    Protein Urine Random 13 mg/dL    Prot/Creat Ratio, Ur 0 07 0 00 - 0 10   CBC   Result Value Ref Range    WBC 6 11 4 31 - 10 16 Thousand/uL    RBC 4 41 3 81 - 5 12 Million/uL    Hemoglobin 13 0 11 5 - 15 4 g/dL    Hematocrit 40 9 34 8 - 46 1 %    MCV 93 82 - 98 fL    MCH 29 5 26 8 - 34 3 pg    MCHC 31 8 31 4 - 37 4 g/dL    RDW 12 1 11 6 - 15 1 %    Platelets 211 227 - 092 Thousands/uL    MPV 9 2 8 9 - 12 7 fL   Urinalysis with microscopic   Result Value Ref Range    Clarity, UA Clear     Color, UA Yellow     Specific Gravity, UA 1 025 1 003 - 1 030    pH, UA 6 0 4 5, 5 0, 5 5, 6 0, 6 5, 7 0, 7 5, 8 0    Glucose, UA Negative Negative mg/dl    Ketones, UA Negative Negative mg/dl    Blood, UA Negative Negative    Protein, UA Negative Negative mg/dl    Nitrite, UA Negative Negative    Bilirubin, UA Negative Negative    Urobilinogen, UA 0 2 0 2, 1 0 E U /dl E U /dl    Leukocytes, UA Negative Negative    WBC, UA None Seen None Seen, 2-4, 5-60 /hpf    RBC, UA 0-1 (A) None Seen, 2-4 /hpf    Bacteria, UA Occasional None Seen, Occasional /hpf    Epithelial Cells Occasional None Seen, Occasional /hpf    MUCUS THREADS Moderate (A) None Seen   Magnesium   Result Value Ref Range    Magnesium 1 9 1 6 - 2 6 mg/dL   Phosphorus   Result Value Ref Range    Phosphorus 3 3 2 7 - 4 5 mg/dL   Vitamin D 25 hydroxy   Result Value Ref Range    Vit D, 25-Hydroxy 42 3 30 0 - 100 0 ng/mL

## 2021-10-05 ENCOUNTER — OFFICE VISIT (OUTPATIENT)
Dept: INTERNAL MEDICINE CLINIC | Facility: CLINIC | Age: 46
End: 2021-10-05
Payer: COMMERCIAL

## 2021-10-05 VITALS
DIASTOLIC BLOOD PRESSURE: 82 MMHG | OXYGEN SATURATION: 100 % | HEART RATE: 96 BPM | RESPIRATION RATE: 16 BRPM | BODY MASS INDEX: 19.29 KG/M2 | SYSTOLIC BLOOD PRESSURE: 128 MMHG | TEMPERATURE: 97.5 F | HEIGHT: 66 IN | WEIGHT: 120 LBS

## 2021-10-05 DIAGNOSIS — R22.1 NECK MASS: Primary | ICD-10-CM

## 2021-10-05 DIAGNOSIS — Z12.4 ENCOUNTER FOR SCREENING FOR CERVICAL CANCER: ICD-10-CM

## 2021-10-05 DIAGNOSIS — Z12.31 ENCOUNTER FOR SCREENING MAMMOGRAM FOR MALIGNANT NEOPLASM OF BREAST: ICD-10-CM

## 2021-10-05 PROCEDURE — 3725F SCREEN DEPRESSION PERFORMED: CPT | Performed by: NURSE PRACTITIONER

## 2021-10-05 PROCEDURE — 99203 OFFICE O/P NEW LOW 30 MIN: CPT | Performed by: NURSE PRACTITIONER

## 2021-10-12 ENCOUNTER — HOSPITAL ENCOUNTER (OUTPATIENT)
Dept: ULTRASOUND IMAGING | Facility: HOSPITAL | Age: 46
Discharge: HOME | End: 2021-10-12
Payer: COMMERCIAL

## 2021-10-12 ENCOUNTER — HOSPITAL ENCOUNTER (OUTPATIENT)
Dept: MAMMOGRAPHY | Facility: HOSPITAL | Age: 46
Discharge: HOME | End: 2021-10-12
Payer: COMMERCIAL

## 2021-10-12 ENCOUNTER — OFFICE VISIT (OUTPATIENT)
Dept: OBGYN CLINIC | Facility: CLINIC | Age: 46
End: 2021-10-12
Payer: COMMERCIAL

## 2021-10-12 VITALS
WEIGHT: 119.6 LBS | SYSTOLIC BLOOD PRESSURE: 108 MMHG | DIASTOLIC BLOOD PRESSURE: 84 MMHG | BODY MASS INDEX: 19.93 KG/M2 | HEIGHT: 65 IN

## 2021-10-12 VITALS — HEIGHT: 66 IN | WEIGHT: 120 LBS | BODY MASS INDEX: 19.29 KG/M2

## 2021-10-12 DIAGNOSIS — R22.1 NECK MASS: ICD-10-CM

## 2021-10-12 DIAGNOSIS — Z11.51 SCREENING FOR HPV (HUMAN PAPILLOMAVIRUS): ICD-10-CM

## 2021-10-12 DIAGNOSIS — Z12.4 ENCOUNTER FOR SCREENING FOR CERVICAL CANCER: ICD-10-CM

## 2021-10-12 DIAGNOSIS — Z12.31 ENCOUNTER FOR SCREENING MAMMOGRAM FOR MALIGNANT NEOPLASM OF BREAST: ICD-10-CM

## 2021-10-12 DIAGNOSIS — Z12.4 ENCOUNTER FOR SCREENING FOR MALIGNANT NEOPLASM OF CERVIX: ICD-10-CM

## 2021-10-12 DIAGNOSIS — Z01.419 WELL FEMALE EXAM WITH ROUTINE GYNECOLOGICAL EXAM: Primary | ICD-10-CM

## 2021-10-12 PROCEDURE — 3008F BODY MASS INDEX DOCD: CPT | Performed by: PHYSICIAN ASSISTANT

## 2021-10-12 PROCEDURE — 76536 US EXAM OF HEAD AND NECK: CPT

## 2021-10-12 PROCEDURE — 77067 SCR MAMMO BI INCL CAD: CPT

## 2021-10-12 PROCEDURE — G0145 SCR C/V CYTO,THINLAYER,RESCR: HCPCS | Performed by: PHYSICIAN ASSISTANT

## 2021-10-12 PROCEDURE — 99386 PREV VISIT NEW AGE 40-64: CPT | Performed by: PHYSICIAN ASSISTANT

## 2021-10-12 PROCEDURE — 77063 BREAST TOMOSYNTHESIS BI: CPT

## 2021-10-12 PROCEDURE — G0476 HPV COMBO ASSAY CA SCREEN: HCPCS | Performed by: PHYSICIAN ASSISTANT

## 2021-10-15 LAB
HPV HR 12 DNA CVX QL NAA+PROBE: NEGATIVE
HPV16 DNA CVX QL NAA+PROBE: NEGATIVE
HPV18 DNA CVX QL NAA+PROBE: NEGATIVE

## 2021-10-20 LAB
LAB AP GYN PRIMARY INTERPRETATION: NORMAL
Lab: NORMAL
PATH INTERP SPEC-IMP: NORMAL

## 2021-11-02 ENCOUNTER — TELEMEDICINE (OUTPATIENT)
Dept: INTERNAL MEDICINE CLINIC | Facility: CLINIC | Age: 46
End: 2021-11-02
Payer: COMMERCIAL

## 2021-11-02 ENCOUNTER — TELEPHONE (OUTPATIENT)
Dept: INTERNAL MEDICINE CLINIC | Facility: CLINIC | Age: 46
End: 2021-11-02

## 2021-11-02 ENCOUNTER — HOSPITAL ENCOUNTER (OUTPATIENT)
Dept: MAMMOGRAPHY | Facility: CLINIC | Age: 46
Discharge: HOME | End: 2021-11-02
Payer: COMMERCIAL

## 2021-11-02 ENCOUNTER — HOSPITAL ENCOUNTER (OUTPATIENT)
Dept: ULTRASOUND IMAGING | Facility: CLINIC | Age: 46
Discharge: HOME | End: 2021-11-02
Payer: COMMERCIAL

## 2021-11-02 VITALS — HEIGHT: 65 IN | BODY MASS INDEX: 19.99 KG/M2 | WEIGHT: 120 LBS

## 2021-11-02 DIAGNOSIS — J02.0 ACUTE STREPTOCOCCAL PHARYNGITIS: Primary | ICD-10-CM

## 2021-11-02 DIAGNOSIS — R92.8 ABNORMAL MAMMOGRAM: ICD-10-CM

## 2021-11-02 PROCEDURE — 77065 DX MAMMO INCL CAD UNI: CPT

## 2021-11-02 PROCEDURE — 99213 OFFICE O/P EST LOW 20 MIN: CPT | Performed by: NURSE PRACTITIONER

## 2021-11-02 PROCEDURE — G0279 TOMOSYNTHESIS, MAMMO: HCPCS

## 2021-11-02 PROCEDURE — 76642 ULTRASOUND BREAST LIMITED: CPT

## 2021-11-02 RX ORDER — AMOXICILLIN AND CLAVULANATE POTASSIUM 875; 125 MG/1; MG/1
1 TABLET, FILM COATED ORAL EVERY 12 HOURS SCHEDULED
Qty: 14 TABLET | Refills: 0 | Status: SHIPPED | OUTPATIENT
Start: 2021-11-02 | End: 2021-11-09

## 2021-11-12 ENCOUNTER — APPOINTMENT (OUTPATIENT)
Dept: LAB | Age: 46
End: 2021-11-12
Payer: COMMERCIAL

## 2021-11-12 DIAGNOSIS — N05.0 MINIMAL CHANGE DISEASE: ICD-10-CM

## 2021-11-12 LAB
CREAT UR-MCNC: 22.7 MG/DL
PROT UR-MCNC: <6 MG/DL
PROT/CREAT UR: <0.26 MG/G{CREAT} (ref 0–0.1)

## 2021-11-12 PROCEDURE — 84156 ASSAY OF PROTEIN URINE: CPT

## 2021-11-12 PROCEDURE — 82570 ASSAY OF URINE CREATININE: CPT

## 2021-11-15 ENCOUNTER — TELEPHONE (OUTPATIENT)
Dept: NEPHROLOGY | Facility: CLINIC | Age: 46
End: 2021-11-15

## 2021-11-24 DIAGNOSIS — Z23 ENCOUNTER FOR IMMUNIZATION: ICD-10-CM

## 2021-11-24 DIAGNOSIS — Z11.4 SCREENING FOR HIV (HUMAN IMMUNODEFICIENCY VIRUS): ICD-10-CM

## 2021-11-30 ENCOUNTER — OFFICE VISIT (OUTPATIENT)
Dept: INTERNAL MEDICINE CLINIC | Facility: CLINIC | Age: 46
End: 2021-11-30
Payer: COMMERCIAL

## 2021-11-30 VITALS
DIASTOLIC BLOOD PRESSURE: 78 MMHG | WEIGHT: 122.2 LBS | RESPIRATION RATE: 14 BRPM | SYSTOLIC BLOOD PRESSURE: 130 MMHG | OXYGEN SATURATION: 99 % | HEART RATE: 85 BPM | BODY MASS INDEX: 20.86 KG/M2 | HEIGHT: 64 IN

## 2021-11-30 DIAGNOSIS — Z00.00 ANNUAL PHYSICAL EXAM: Primary | ICD-10-CM

## 2021-11-30 PROCEDURE — 3008F BODY MASS INDEX DOCD: CPT | Performed by: NURSE PRACTITIONER

## 2021-11-30 PROCEDURE — 99396 PREV VISIT EST AGE 40-64: CPT | Performed by: NURSE PRACTITIONER

## 2022-01-04 ENCOUNTER — DOCUMENTATION (OUTPATIENT)
Dept: NEPHROLOGY | Facility: CLINIC | Age: 47
End: 2022-01-04

## 2022-01-22 ENCOUNTER — APPOINTMENT (OUTPATIENT)
Dept: LAB | Age: 47
End: 2022-01-22
Payer: COMMERCIAL

## 2022-01-22 DIAGNOSIS — N04.0 NEPHROTIC SYNDROME WITH LESION OF MINIMAL CHANGE GLOMERULONEPHRITIS: ICD-10-CM

## 2022-01-22 DIAGNOSIS — N05.0 MINIMAL CHANGE DISEASE: ICD-10-CM

## 2022-01-22 DIAGNOSIS — R22.1 NECK MASS: ICD-10-CM

## 2022-01-22 LAB
ALBUMIN SERPL BCP-MCNC: 3.6 G/DL (ref 3.5–5)
ALP SERPL-CCNC: 41 U/L (ref 46–116)
ALT SERPL W P-5'-P-CCNC: 37 U/L (ref 12–78)
ANION GAP SERPL CALCULATED.3IONS-SCNC: 3 MMOL/L (ref 4–13)
AST SERPL W P-5'-P-CCNC: 23 U/L (ref 5–45)
BACTERIA UR QL AUTO: ABNORMAL /HPF
BILIRUB SERPL-MCNC: 0.9 MG/DL (ref 0.2–1)
BILIRUB UR QL STRIP: NEGATIVE
BUN SERPL-MCNC: 11 MG/DL (ref 5–25)
CALCIUM SERPL-MCNC: 8.3 MG/DL (ref 8.3–10.1)
CHLORIDE SERPL-SCNC: 111 MMOL/L (ref 100–108)
CHOLEST SERPL-MCNC: 177 MG/DL
CLARITY UR: ABNORMAL
CO2 SERPL-SCNC: 27 MMOL/L (ref 21–32)
COLOR UR: YELLOW
CREAT SERPL-MCNC: 0.53 MG/DL (ref 0.6–1.3)
CREAT UR-MCNC: 134 MG/DL
ERYTHROCYTE [DISTWIDTH] IN BLOOD BY AUTOMATED COUNT: 12.2 % (ref 11.6–15.1)
GFR SERPL CREATININE-BSD FRML MDRD: 115 ML/MIN/1.73SQ M
GLUCOSE P FAST SERPL-MCNC: 80 MG/DL (ref 65–99)
GLUCOSE UR STRIP-MCNC: NEGATIVE MG/DL
HCT VFR BLD AUTO: 37.1 % (ref 34.8–46.1)
HDLC SERPL-MCNC: 71 MG/DL
HGB BLD-MCNC: 12 G/DL (ref 11.5–15.4)
HGB UR QL STRIP.AUTO: ABNORMAL
HYALINE CASTS #/AREA URNS LPF: ABNORMAL /LPF
KETONES UR STRIP-MCNC: NEGATIVE MG/DL
LDLC SERPL CALC-MCNC: 97 MG/DL (ref 0–100)
LEUKOCYTE ESTERASE UR QL STRIP: NEGATIVE
MCH RBC QN AUTO: 29.7 PG (ref 26.8–34.3)
MCHC RBC AUTO-ENTMCNC: 32.3 G/DL (ref 31.4–37.4)
MCV RBC AUTO: 92 FL (ref 82–98)
NITRITE UR QL STRIP: NEGATIVE
NON-SQ EPI CELLS URNS QL MICRO: ABNORMAL /HPF
NONHDLC SERPL-MCNC: 106 MG/DL
PH UR STRIP.AUTO: 6 [PH]
PHOSPHATE SERPL-MCNC: 3 MG/DL (ref 2.7–4.5)
PLATELET # BLD AUTO: 307 THOUSANDS/UL (ref 149–390)
PMV BLD AUTO: 9.3 FL (ref 8.9–12.7)
POTASSIUM SERPL-SCNC: 4 MMOL/L (ref 3.5–5.3)
PROT SERPL-MCNC: 6.6 G/DL (ref 6.4–8.2)
PROT UR STRIP-MCNC: NEGATIVE MG/DL
PROT UR-MCNC: 10 MG/DL
PROT/CREAT UR: 0.07 MG/G{CREAT} (ref 0–0.1)
RBC # BLD AUTO: 4.04 MILLION/UL (ref 3.81–5.12)
RBC #/AREA URNS AUTO: ABNORMAL /HPF
SODIUM SERPL-SCNC: 141 MMOL/L (ref 136–145)
SP GR UR STRIP.AUTO: 1.02 (ref 1–1.03)
TRIGL SERPL-MCNC: 47 MG/DL
UROBILINOGEN UR QL STRIP.AUTO: 0.2 E.U./DL
WBC # BLD AUTO: 5.42 THOUSAND/UL (ref 4.31–10.16)
WBC #/AREA URNS AUTO: ABNORMAL /HPF

## 2022-01-22 PROCEDURE — 82570 ASSAY OF URINE CREATININE: CPT

## 2022-01-22 PROCEDURE — 80197 ASSAY OF TACROLIMUS: CPT

## 2022-01-22 PROCEDURE — 36415 COLL VENOUS BLD VENIPUNCTURE: CPT

## 2022-01-22 PROCEDURE — 81001 URINALYSIS AUTO W/SCOPE: CPT

## 2022-01-22 PROCEDURE — 84100 ASSAY OF PHOSPHORUS: CPT

## 2022-01-22 PROCEDURE — 84156 ASSAY OF PROTEIN URINE: CPT

## 2022-01-22 PROCEDURE — 85027 COMPLETE CBC AUTOMATED: CPT

## 2022-01-22 PROCEDURE — 80061 LIPID PANEL: CPT

## 2022-01-22 PROCEDURE — 80053 COMPREHEN METABOLIC PANEL: CPT

## 2022-01-23 LAB — TACROLIMUS BLD-MCNC: 5.8 NG/ML (ref 2–20)

## 2022-02-03 ENCOUNTER — TELEMEDICINE (OUTPATIENT)
Dept: NEPHROLOGY | Facility: CLINIC | Age: 47
End: 2022-02-03
Payer: COMMERCIAL

## 2022-02-03 DIAGNOSIS — R80.9 PROTEINURIA, UNSPECIFIED TYPE: ICD-10-CM

## 2022-02-03 DIAGNOSIS — N04.0 NEPHROTIC SYNDROME WITH LESION OF MINIMAL CHANGE GLOMERULONEPHRITIS: Primary | ICD-10-CM

## 2022-02-03 DIAGNOSIS — N05.0 MINIMAL CHANGE DISEASE: ICD-10-CM

## 2022-02-03 PROCEDURE — 99214 OFFICE O/P EST MOD 30 MIN: CPT | Performed by: INTERNAL MEDICINE

## 2022-02-03 RX ORDER — TACROLIMUS 1 MG/1
CAPSULE ORAL
Qty: 150 CAPSULE | Refills: 4 | Status: SHIPPED | OUTPATIENT
Start: 2022-02-03 | End: 2022-02-04 | Stop reason: SDUPTHER

## 2022-02-03 NOTE — PROGRESS NOTES
NEPHROLOGY VIRTUAL VISIT (Video)  Lester Silva 41 y o  female MRN: [id number]  DATE: 02/03/22    Reason for visit: Nephrotic syndrome     ASSESSMENT & PLAN:  1  Nephrotic syndrome due to minimal change disease:  · Summary: Diagnosed in Sep 2019 and responded well to Prednisone - Was treated from 10/1/19 to 4/4/20  Had relapse of SHANTEL in May 2020 and restarted Prednisone on 5/18/20  Unfortunately, she had a relapse in Dec 2020 while Prednisone was being titrated down  Prednisone was increased and Tacrolimus was added on 2/3/21  · Since then, Prednisone has been weaned off - has been off since June 2021  · She is currently only Tacrolimus 3 mg BID and has maintained remission  · Will start weaning down Tacrolimus to 3 mg in AM and 2 mg in PM - she will do this for Feb and Mar 2022  Thereafter, she will go down to Tacrolimus 2 mg BID starting April 2022  · We will be doing monthly UPC ratios while the Tacrolimus is being weaned       Patient Instructions   Please decrease Tacrolimus down to 3 mg in AM and 2 mg in PM for 2 months (Do this in Feb and March 2022)  Starting April 2022, decrease Tacrolimus to 2 mg BID  Check monthly UPC ratio  Follow up in 4 months  The patient was informed that this was a billable visit and they are aware of it and wish to proceed  Verification of patient location: Patient is located in the following state in which I hold an active license [place]    Encounter provider Antoine Foy MD    Provider located at [address] [place] [address]  [address] [place] [id number]    Recent Visits  No visits were found meeting these conditions  Showing recent visits within past 7 days and meeting all other requirements  Future Appointments  No visits were found meeting these conditions  Showing future appointments within next 150 days and meeting all other requirements     The patient was identified by name and date of birth   Lester March Yokasta Sharp was informed that this is a telemedicine visit and that the visit is being conducted through [address] [place] [address] [facility] [employer] and patient was informed that this is a secure, HIPAA-compliant platform  She agrees to proceed     My office door was closed  No one else was in the room  She acknowledged consent and understanding of privacy and security of the video platform  The patient has agreed to participate and understands they can discontinue the visit at any time  SUBJECTIVE / INTERVAL HISTORY:  David Montemayor was last seen in the office in Sep 2021  Since then, there have been no recent hospitalizations or ER visits  She has been feeling well  She is tolerating tacrolimus  No leg swelling or foamy urine  Past Medical History:   Diagnosis Date    Fibroid     Hyperlipidemia     Nephrotic syndrome      Past Surgical History:   Procedure Laterality Date    IR BIOPSY KIDNEY MASS  9/25/2019    TUBAL LIGATION      WISDOM TOOTH EXTRACTION       ALLERGIES: No Known Allergies    REVIEW OF SYSTEMS:  Review of Systems   Constitutional: Negative for appetite change, chills, fatigue and fever  Respiratory: Negative for cough and shortness of breath  Cardiovascular: Negative for chest pain and leg swelling  Gastrointestinal: Negative for abdominal pain, diarrhea, nausea and vomiting  Genitourinary: Negative for dysuria and hematuria  Musculoskeletal: Negative for arthralgias and back pain  Skin: Negative for rash  Allergic/Immunologic: Positive for immunocompromised state  Neurological: Negative for dizziness and light-headedness  OBJECTIVE:  Physical Exam  Exam was limited by the quality of the video  BP not checking  General: conscious, coherent and not in acute distress over video  Skin: no visible rash  Eyes: appeared normal  ENT: atraumatic, nose and ears normal externally    Respiratory: talking in full sentences without difficulty, not in respiratory distress  CVS: could not evaluate  GI: could not evaluate  : could not evaluate  MSK: could not evaluate  Neuro: awake, alert and oriented     Psych: appropriate affect     Medications:  Current Outpatient Medications:     tacrolimus (PROGRAF) 1 mg capsule, Take 3 capsules (3 mg total) by mouth every 12 (twelve) hours, Disp: 180 capsule, Rfl: 4    VITAMIN D, CHOLECALCIFEROL, PO, Take by mouth, Disp: , Rfl:     Laboratory Results:  Results for orders placed or performed in visit on 01/22/22   Comprehensive metabolic panel   Result Value Ref Range    Sodium 141 136 - 145 mmol/L    Potassium 4 0 3 5 - 5 3 mmol/L    Chloride 111 (H) 100 - 108 mmol/L    CO2 27 21 - 32 mmol/L    ANION GAP 3 (L) 4 - 13 mmol/L    BUN 11 5 - 25 mg/dL    Creatinine 0 53 (L) 0 60 - 1 30 mg/dL    Glucose, Fasting 80 65 - 99 mg/dL    Calcium 8 3 8 3 - 10 1 mg/dL    AST 23 5 - 45 U/L    ALT 37 12 - 78 U/L    Alkaline Phosphatase 41 (L) 46 - 116 U/L    Total Protein 6 6 6 4 - 8 2 g/dL    Albumin 3 6 3 5 - 5 0 g/dL    Total Bilirubin 0 90 0 20 - 1 00 mg/dL    eGFR 115 ml/min/1 73sq m   Urinalysis with microscopic   Result Value Ref Range    Clarity, UA Hazy     Color, UA Yellow     Specific Gravity, UA 1 025 1 003 - 1 030    pH, UA 6 0 4 5, 5 0, 5 5, 6 0, 6 5, 7 0, 7 5, 8 0    Glucose, UA Negative Negative mg/dl    Ketones, UA Negative Negative mg/dl    Blood, UA Trace-lysed     Protein, UA Negative Negative mg/dl    Nitrite, UA Negative Negative    Bilirubin, UA Negative Negative    Urobilinogen, UA 0 2 0 2, 1 0 E U /dl E U /dl    Leukocytes, UA Negative Negative    WBC, UA None Seen None Seen, 2-4, 5-60 /hpf    RBC, UA 2-4 None Seen, 2-4 /hpf    Hyaline Casts, UA 3-5 (A) None Seen /lpf    Bacteria, UA None Seen None Seen, Occasional /hpf    Epithelial Cells None Seen None Seen, Occasional /hpf   Protein / creatinine ratio, urine   Result Value Ref Range    Creatinine, Ur 134 0 mg/dL    Protein Urine Random 10 mg/dL    Prot/Creat Ratio, Ur 0 07 0 00 - 0 10   Tacrolimus level   Result Value Ref Range    TACROLIMUS 5 8 2 0 - 20 0 ng/mL   Lipid panel   Result Value Ref Range    Cholesterol 177 See Comment mg/dL    Triglycerides 47 See Comment mg/dL    HDL, Direct 71 >=50 mg/dL    LDL Calculated 97 0 - 100 mg/dL    Non-HDL-Chol (CHOL-HDL) 106 mg/dl   CBC   Result Value Ref Range    WBC 5 42 4 31 - 10 16 Thousand/uL    RBC 4 04 3 81 - 5 12 Million/uL    Hemoglobin 12 0 11 5 - 15 4 g/dL    Hematocrit 37 1 34 8 - 46 1 %    MCV 92 82 - 98 fL    MCH 29 7 26 8 - 34 3 pg    MCHC 32 3 31 4 - 37 4 g/dL    RDW 12 2 11 6 - 15 1 %    Platelets 894 412 - 033 Thousands/uL    MPV 9 3 8 9 - 12 7 fL   Phosphorus   Result Value Ref Range    Phosphorus 3 0 2 7 - 4 5 mg/dL     I spent 17 minutes with the patient during this visit  VIRTUAL VISIT DISCLAIMER    Deepti Arreaga verbally agrees to participate in [employer]  Pt is aware that [employer] could be limited without vital signs or the ability to perform a full hands-on physical exam  Bessy Zhang understands she or the provider may request at any time to terminate the video visit and request the patient to seek care or treatment in person

## 2022-02-03 NOTE — LETTER
February 3, 2022     Gabi Dixon, [address] [facility] [address]    Patient: Joaquín Galindo   YOB: 1975   Date of Visit: 2/3/2022       Dear Dr Karmen Cole: Thank you for referring Surya Farrell to me for evaluation  Below are my notes for this consultation  If you have questions, please do not hesitate to call me  I look forward to following your patient along with you  Sincerely,        Minda Dixon MD        CC: No Recipients  Minda Dixon MD  2/3/2022  9:06 AM  Sign when Signing Visit  NEPHROLOGY VIRTUAL VISIT (Video)  Joaquín Galindo 41 y o  female MRN: [id number]  DATE: 02/03/22    Reason for visit: Nephrotic syndrome     ASSESSMENT & PLAN:  1  Nephrotic syndrome due to minimal change disease:  · Summary: Diagnosed in Sep 2019 and responded well to Prednisone - Was treated from 10/1/19 to 4/4/20  Had relapse of SHANTEL in May 2020 and restarted Prednisone on 5/18/20  Unfortunately, she had a relapse in Dec 2020 while Prednisone was being titrated down  Prednisone was increased and Tacrolimus was added on 2/3/21  · Since then, Prednisone has been weaned off - has been off since June 2021  · She is currently only Tacrolimus 3 mg BID and has maintained remission  · Will start weaning down Tacrolimus to 3 mg in AM and 2 mg in PM - she will do this for Feb and Mar 2022  Thereafter, she will go down to Tacrolimus 2 mg BID starting April 2022  · We will be doing monthly UPC ratios while the Tacrolimus is being weaned       Patient Instructions   Please decrease Tacrolimus down to 3 mg in AM and 2 mg in PM for 2 months (Do this in Feb and March 2022)  Starting April 2022, decrease Tacrolimus to 2 mg BID  Check monthly UPC ratio  Follow up in 4 months  The patient was informed that this was a billable visit and they are aware of it and wish to proceed       Verification of patient location: Patient is located in the following state in which I hold an active license [place]    Encounter provider Minda Dixon MD    Provider located at [address] [place] [address]    Recent Visits  No visits were found meeting these conditions  Showing recent visits within past 7 days and meeting all other requirements  Future Appointments  No visits were found meeting these conditions  Showing future appointments within next 150 days and meeting all other requirements     The patient was identified by name and date of birth  Joaquín Galindo was informed that this is a telemedicine visit and that the visit is being conducted through [address] [employer] and patient was informed that this is a secure, HIPAA-compliant platform  She agrees to proceed     My office door was closed  No one else was in the room  She acknowledged consent and understanding of privacy and security of the video platform  The patient has agreed to participate and understands they can discontinue the visit at any time  SUBJECTIVE / INTERVAL HISTORY:  Ekaterina Bartlett was last seen in the office in Sep 2021  Since then, there have been no recent hospitalizations or ER visits  She has been feeling well  She is tolerating tacrolimus  No leg swelling or foamy urine  Past Medical History:   Diagnosis Date    Fibroid     Hyperlipidemia     Nephrotic syndrome      Past Surgical History:   Procedure Laterality Date    IR BIOPSY KIDNEY MASS  9/25/2019    TUBAL LIGATION      WISDOM TOOTH EXTRACTION       ALLERGIES: No Known Allergies    REVIEW OF SYSTEMS:  Review of Systems   Constitutional: Negative for appetite change, chills, fatigue and fever  Respiratory: Negative for cough and shortness of breath  Cardiovascular: Negative for chest pain and leg swelling  Gastrointestinal: Negative for abdominal pain, diarrhea, nausea and vomiting  Genitourinary: Negative for dysuria and hematuria  Musculoskeletal: Negative for arthralgias and back pain     Skin: Negative for rash  Allergic/Immunologic: Positive for immunocompromised state  Neurological: Negative for dizziness and light-headedness  OBJECTIVE:  Physical Exam  Exam was limited by the quality of the video  BP not checking  General: conscious, coherent and not in acute distress over video  Skin: no visible rash  Eyes: appeared normal  ENT: atraumatic, nose and ears normal externally  Respiratory: talking in full sentences without difficulty, not in respiratory distress  CVS: could not evaluate  GI: could not evaluate  : could not evaluate  MSK: could not evaluate  Neuro: awake, alert and oriented     Psych: appropriate affect     Medications:  Current Outpatient Medications:     tacrolimus (PROGRAF) 1 mg capsule, Take 3 capsules (3 mg total) by mouth every 12 (twelve) hours, Disp: 180 capsule, Rfl: 4    VITAMIN D, CHOLECALCIFEROL, PO, Take by mouth, Disp: , Rfl:     Laboratory Results:  Results for orders placed or performed in visit on 01/22/22   Comprehensive metabolic panel   Result Value Ref Range    Sodium 141 136 - 145 mmol/L    Potassium 4 0 3 5 - 5 3 mmol/L    Chloride 111 (H) 100 - 108 mmol/L    CO2 27 21 - 32 mmol/L    ANION GAP 3 (L) 4 - 13 mmol/L    BUN 11 5 - 25 mg/dL    Creatinine 0 53 (L) 0 60 - 1 30 mg/dL    Glucose, Fasting 80 65 - 99 mg/dL    Calcium 8 3 8 3 - 10 1 mg/dL    AST 23 5 - 45 U/L    ALT 37 12 - 78 U/L    Alkaline Phosphatase 41 (L) 46 - 116 U/L    Total Protein 6 6 6 4 - 8 2 g/dL    Albumin 3 6 3 5 - 5 0 g/dL    Total Bilirubin 0 90 0 20 - 1 00 mg/dL    eGFR 115 ml/min/1 73sq m   Urinalysis with microscopic   Result Value Ref Range    Clarity, UA Hazy     Color, UA Yellow     Specific Gravity, UA 1 025 1 003 - 1 030    pH, UA 6 0 4 5, 5 0, 5 5, 6 0, 6 5, 7 0, 7 5, 8 0    Glucose, UA Negative Negative mg/dl    Ketones, UA Negative Negative mg/dl    Blood, UA Trace-lysed     Protein, UA Negative Negative mg/dl    Nitrite, UA Negative Negative    Bilirubin, UA Negative Negative    Urobilinogen, UA 0 2 0 2, 1 0 E U /dl E U /dl    Leukocytes, UA Negative Negative    WBC, UA None Seen None Seen, 2-4, 5-60 /hpf    RBC, UA 2-4 None Seen, 2-4 /hpf    Hyaline Casts, UA 3-5 (A) None Seen /lpf    Bacteria, UA None Seen None Seen, Occasional /hpf    Epithelial Cells None Seen None Seen, Occasional /hpf   Protein / creatinine ratio, urine   Result Value Ref Range    Creatinine, Ur 134 0 mg/dL    Protein Urine Random 10 mg/dL    Prot/Creat Ratio, Ur 0 07 0 00 - 0 10   Tacrolimus level   Result Value Ref Range    TACROLIMUS 5 8 2 0 - 20 0 ng/mL   Lipid panel   Result Value Ref Range    Cholesterol 177 See Comment mg/dL    Triglycerides 47 See Comment mg/dL    HDL, Direct 71 >=50 mg/dL    LDL Calculated 97 0 - 100 mg/dL    Non-HDL-Chol (CHOL-HDL) 106 mg/dl   CBC   Result Value Ref Range    WBC 5 42 4 31 - 10 16 Thousand/uL    RBC 4 04 3 81 - 5 12 Million/uL    Hemoglobin 12 0 11 5 - 15 4 g/dL    Hematocrit 37 1 34 8 - 46 1 %    MCV 92 82 - 98 fL    MCH 29 7 26 8 - 34 3 pg    MCHC 32 3 31 4 - 37 4 g/dL    RDW 12 2 11 6 - 15 1 %    Platelets 367 674 - 098 Thousands/uL    MPV 9 3 8 9 - 12 7 fL   Phosphorus   Result Value Ref Range    Phosphorus 3 0 2 7 - 4 5 mg/dL     I spent 17 minutes with the patient during this visit  VIRTUAL VISIT DISCLAIMER    Justa Rose verbally agrees to participate in [employer]  Pt is aware that [employer] could be limited without vital signs or the ability to perform a full hands-on physical exam  Bessy Hill understands she or the provider may request at any time to terminate the video visit and request the patient to seek care or treatment in person

## 2022-02-03 NOTE — PATIENT INSTRUCTIONS
Please decrease Tacrolimus down to 3 mg in AM and 2 mg in PM for 2 months (Do this in Feb and March 2022)  Starting April 2022, decrease Tacrolimus to 2 mg BID  Check monthly UPC ratio  Follow up in 4 months

## 2022-02-04 DIAGNOSIS — N04.0 NEPHROTIC SYNDROME WITH LESION OF MINIMAL CHANGE GLOMERULONEPHRITIS: ICD-10-CM

## 2022-02-04 RX ORDER — TACROLIMUS 1 MG/1
CAPSULE ORAL
Qty: 150 CAPSULE | Refills: 4 | Status: SHIPPED | OUTPATIENT
Start: 2022-02-04 | End: 2022-07-07

## 2022-03-16 ENCOUNTER — APPOINTMENT (OUTPATIENT)
Dept: LAB | Age: 47
End: 2022-03-16
Payer: COMMERCIAL

## 2022-03-16 LAB
CREAT UR-MCNC: 23.7 MG/DL
PROT UR-MCNC: <6 MG/DL
PROT/CREAT UR: <0.25 MG/G{CREAT} (ref 0–0.1)

## 2022-03-16 PROCEDURE — 84156 ASSAY OF PROTEIN URINE: CPT | Performed by: INTERNAL MEDICINE

## 2022-03-16 PROCEDURE — 82570 ASSAY OF URINE CREATININE: CPT | Performed by: INTERNAL MEDICINE

## 2022-04-27 ENCOUNTER — TELEPHONE (OUTPATIENT)
Dept: NEPHROLOGY | Facility: CLINIC | Age: 47
End: 2022-04-27

## 2022-04-27 NOTE — TELEPHONE ENCOUNTER
Patient is scheduled for July, but was due to be seen in June  She was wondering if her tacrolimus will be adjusted in June  She states you have been trying to lower her medication dosage and it was next slated to be lowered in June

## 2022-04-28 NOTE — TELEPHONE ENCOUNTER
Message left to continue Tacrolimus 2 mg  BID until her next office visit which is scheduled for July  (not to decrease the dose in June) per Dr Rand Gee

## 2022-04-28 NOTE — TELEPHONE ENCOUNTER
Please have her stay on Tacrolimus 2 mg BID (this should be the dose as of April 2022) until next office visit and not decrease the dose in June 2022

## 2022-05-03 LAB
CREAT UR-MCNC: 90 MG/DL (ref 20–275)
PROT UR-MCNC: 7 MG/DL (ref 5–24)
PROT/CREAT UR: 0.08 MG/MG CREAT (ref 0.02–0.16)
PROT/CREAT UR: 78 MG/G CREAT (ref 21–161)

## 2022-05-04 ENCOUNTER — TELEPHONE (OUTPATIENT)
Dept: NEPHROLOGY | Facility: CLINIC | Age: 47
End: 2022-05-04

## 2022-05-04 NOTE — TELEPHONE ENCOUNTER
Pt advised that urine protein is excellent per Dr Tomer Branch     ----- Message from Darius Mccracken MD sent at 5/4/2022  3:44 PM EDT -----  Please inform patient that urinary protein looks excellent

## 2022-06-10 ENCOUNTER — OFFICE VISIT (OUTPATIENT)
Dept: INTERNAL MEDICINE CLINIC | Facility: CLINIC | Age: 47
End: 2022-06-10
Payer: COMMERCIAL

## 2022-06-10 VITALS
DIASTOLIC BLOOD PRESSURE: 84 MMHG | HEIGHT: 64 IN | OXYGEN SATURATION: 99 % | HEART RATE: 105 BPM | SYSTOLIC BLOOD PRESSURE: 124 MMHG | BODY MASS INDEX: 20.59 KG/M2 | WEIGHT: 120.6 LBS

## 2022-06-10 DIAGNOSIS — M54.50 CHRONIC LEFT-SIDED LOW BACK PAIN WITHOUT SCIATICA: Primary | ICD-10-CM

## 2022-06-10 DIAGNOSIS — G89.29 CHRONIC LEFT-SIDED LOW BACK PAIN WITHOUT SCIATICA: Primary | ICD-10-CM

## 2022-06-10 PROCEDURE — 99213 OFFICE O/P EST LOW 20 MIN: CPT | Performed by: NURSE PRACTITIONER

## 2022-06-10 PROCEDURE — 3008F BODY MASS INDEX DOCD: CPT | Performed by: NURSE PRACTITIONER

## 2022-06-10 NOTE — PROGRESS NOTES
Assessment/Plan:    Chronic left-sided low back pain without sciatica  Xray of lumbar spine  Start physical therapy  Avoid bending and heavy lifting  Seems to be piriformis muscle syndrome       Diagnoses and all orders for this visit:    Chronic left-sided low back pain without sciatica  -     XR spine lumbar minimum 4 views non injury; Future  -     Ambulatory Referral to Physical Therapy; Future    Other orders  -     Multiple Vitamin (MULTIVITAMIN ADULT PO); Take 1 tablet by mouth in the morning          Subjective:      Patient ID: Gaviota Sosa is a 41 y o  female  Patient is here for months of left upper buttock pain radiating to the thigh  No numbness or tingling  Feels like a "tightness"  Denies having any xrays or tried physical therapy  She can bear weigh and walk      The following portions of the patient's history were reviewed and updated as appropriate: allergies, current medications, past family history, past medical history, past social history, past surgical history and problem list     Review of Systems   Constitutional: Negative  HENT: Negative  Eyes: Negative  Respiratory: Negative  Cardiovascular: Negative  Gastrointestinal: Negative  Musculoskeletal: Positive for back pain  Neurological: Negative  Objective:      /84   Pulse 105   Ht 5' 4" (1 626 m)   Wt 54 7 kg (120 lb 9 6 oz)   SpO2 99%   BMI 20 70 kg/m²          Physical Exam  Vitals reviewed  Constitutional:       Appearance: Normal appearance  Musculoskeletal:      Lumbar back: Spasms and tenderness present  Decreased range of motion  Back:    Neurological:      Mental Status: She is alert

## 2022-06-11 ENCOUNTER — HOSPITAL ENCOUNTER (OUTPATIENT)
Dept: RADIOLOGY | Facility: HOSPITAL | Age: 47
Discharge: HOME | End: 2022-06-11
Payer: COMMERCIAL

## 2022-06-11 DIAGNOSIS — G89.29 CHRONIC LEFT-SIDED LOW BACK PAIN WITHOUT SCIATICA: ICD-10-CM

## 2022-06-11 DIAGNOSIS — M54.50 CHRONIC LEFT-SIDED LOW BACK PAIN WITHOUT SCIATICA: ICD-10-CM

## 2022-06-11 PROCEDURE — 72110 X-RAY EXAM L-2 SPINE 4/>VWS: CPT

## 2022-06-12 PROBLEM — M54.50 CHRONIC LEFT-SIDED LOW BACK PAIN WITHOUT SCIATICA: Status: ACTIVE | Noted: 2022-06-12

## 2022-06-12 PROBLEM — G89.29 CHRONIC LEFT-SIDED LOW BACK PAIN WITHOUT SCIATICA: Status: ACTIVE | Noted: 2022-06-12

## 2022-06-12 NOTE — ASSESSMENT & PLAN NOTE
Xray of lumbar spine  Start physical therapy  Avoid bending and heavy lifting   Seems to be piriformis muscle syndrome

## 2022-06-27 LAB
ALBUMIN SERPL-MCNC: 4.2 G/DL (ref 3.6–5.1)
ALBUMIN/GLOB SERPL: 1.7 (CALC) (ref 1–2.5)
ALP SERPL-CCNC: 37 U/L (ref 31–125)
ALT SERPL-CCNC: 26 U/L (ref 6–29)
AST SERPL-CCNC: 22 U/L (ref 10–35)
BASOPHILS # BLD AUTO: 91 CELLS/UL (ref 0–200)
BASOPHILS NFR BLD AUTO: 1.6 %
BILIRUB SERPL-MCNC: 1.3 MG/DL (ref 0.2–1.2)
BUN SERPL-MCNC: 12 MG/DL (ref 7–25)
BUN/CREAT SERPL: ABNORMAL (CALC) (ref 6–22)
CALCIUM SERPL-MCNC: 9.5 MG/DL (ref 8.6–10.2)
CHLORIDE SERPL-SCNC: 105 MMOL/L (ref 98–110)
CO2 SERPL-SCNC: 30 MMOL/L (ref 20–32)
CREAT SERPL-MCNC: 0.54 MG/DL (ref 0.5–1.1)
CREAT UR-MCNC: 93 MG/DL (ref 20–275)
EOSINOPHIL # BLD AUTO: 279 CELLS/UL (ref 15–500)
EOSINOPHIL NFR BLD AUTO: 4.9 %
ERYTHROCYTE [DISTWIDTH] IN BLOOD BY AUTOMATED COUNT: 11.7 % (ref 11–15)
GLOBULIN SER CALC-MCNC: 2.5 G/DL (CALC) (ref 1.9–3.7)
GLUCOSE SERPL-MCNC: 84 MG/DL (ref 65–99)
HCT VFR BLD AUTO: 40.9 % (ref 35–45)
HGB BLD-MCNC: 13.4 G/DL (ref 11.7–15.5)
LYMPHOCYTES # BLD AUTO: 2519 CELLS/UL (ref 850–3900)
LYMPHOCYTES NFR BLD AUTO: 44.2 %
MAGNESIUM SERPL-MCNC: 1.9 MG/DL (ref 1.5–2.5)
MCH RBC QN AUTO: 30 PG (ref 27–33)
MCHC RBC AUTO-ENTMCNC: 32.8 G/DL (ref 32–36)
MCV RBC AUTO: 91.5 FL (ref 80–100)
MONOCYTES # BLD AUTO: 576 CELLS/UL (ref 200–950)
MONOCYTES NFR BLD AUTO: 10.1 %
NEUTROPHILS # BLD AUTO: 2234 CELLS/UL (ref 1500–7800)
NEUTROPHILS NFR BLD AUTO: 39.2 %
PHOSPHATE SERPL-MCNC: 3.7 MG/DL (ref 2.5–4.5)
PLATELET # BLD AUTO: 338 THOUSAND/UL (ref 140–400)
PMV BLD REES-ECKER: 9.1 FL (ref 7.5–12.5)
POTASSIUM SERPL-SCNC: 4.5 MMOL/L (ref 3.5–5.3)
PROT SERPL-MCNC: 6.7 G/DL (ref 6.1–8.1)
PROT UR-MCNC: 7 MG/DL (ref 5–24)
PROT/CREAT UR: 0.07 MG/MG CREAT (ref 0.02–0.16)
PROT/CREAT UR: 75 MG/G CREAT (ref 21–161)
RBC # BLD AUTO: 4.47 MILLION/UL (ref 3.8–5.1)
SL AMB EGFR AFRICAN AMERICAN: 132 ML/MIN/1.73M2
SL AMB EGFR NON AFRICAN AMERICAN: 114 ML/MIN/1.73M2
SODIUM SERPL-SCNC: 139 MMOL/L (ref 135–146)
TACROLIMUS BLD-MCNC: 3.3 MCG/L
WBC # BLD AUTO: 5.7 THOUSAND/UL (ref 3.8–10.8)

## 2022-06-30 ENCOUNTER — EVALUATION (OUTPATIENT)
Dept: PHYSICAL THERAPY | Facility: CLINIC | Age: 47
End: 2022-06-30
Payer: COMMERCIAL

## 2022-06-30 DIAGNOSIS — G89.29 CHRONIC LEFT-SIDED LOW BACK PAIN WITHOUT SCIATICA: Primary | ICD-10-CM

## 2022-06-30 DIAGNOSIS — M54.50 CHRONIC LEFT-SIDED LOW BACK PAIN WITHOUT SCIATICA: Primary | ICD-10-CM

## 2022-06-30 PROCEDURE — 97161 PT EVAL LOW COMPLEX 20 MIN: CPT

## 2022-06-30 PROCEDURE — 97110 THERAPEUTIC EXERCISES: CPT

## 2022-06-30 NOTE — PROGRESS NOTES
PT Evaluation     Today's date: 2022  Patient name: Jes Smith  : 1975  MRN: [id number]  Referring provider: JENNY Monterroso  Dx:   Encounter Diagnosis     ICD-10-CM    1  Chronic left-sided low back pain without sciatica  M54 50 Ambulatory Referral to Physical Therapy    G89 29                   Assessment  Assessment details: Saint Lown is a 40 yo female presenting with complaints of LBP and LLE radicular sx's  Pt demonstrates limited and painful lumbar extension A/PROM, decreased L ankle dorsiflexion strength, and peripheralization/intolerance to lumbar extension positions leading to limitations with long duration positioning, standing, walking, ADLs, working and exercising  Pt's clinical presentation aligns with lumbar radiculopathy classification, possible of L4-5 level  Pt would benefit from skilled physical therapy interventions in order to address the stated impairments, decrease pain with function and increase activity tolerance in order to improve quality of life  No further referral appears necessary at this time based on examination results  Prognosis is good given HEP compliance and PT 1-2/wk   Positive prognostic indicators include positive attitude toward recovery  Please contact me if you have any questions or recommendations  Thank you for the opportunity to share in Bessy's care      Impairments: abnormal gait, abnormal muscle firing, abnormal muscle tone, abnormal or restricted ROM, activity intolerance, impaired balance, impaired physical strength, lacks appropriate home exercise program, pain with function, safety issue and weight-bearing intolerance    Symptom irritability: moderateBarriers to therapy: None  Understanding of Dx/Px/POC: good   Prognosis: good    Goals  Short Term Goals:  Pt will report decreased levels of pain by at least 2 subjective ratings in 4 weeks  Pt will demonstrate improved ROM by at least 10 degrees in 4 weeks  Pt will demonstrate improved strength by ½ grade in 4 weeks  Pt will be able to walk for 10 mins in 4 weeks    Long Term Goals:  Pt will be independent with HEP in 8 weeks  Pt will be pain free with ADL's in 8 weeks  Pt will be able to walk for 30 minutes in 8 weeks      Plan  Patient would benefit from: skilled physical therapy  Referral necessary: No  Planned modality interventions: low level laser therapy  Planned therapy interventions: abdominal trunk stabilization, balance/weight bearing training, body mechanics training, breathing training, functional ROM exercises, gait training, graded activity, graded exercise, home exercise program, joint mobilization, manual therapy, neuromuscular re-education, patient education, postural training, strengthening, stretching, therapeutic activities and therapeutic exercise  Frequency: 1x week  Duration in weeks: 12  Plan of Care beginning date: 2022  Plan of Care expiration date: 2022  Treatment plan discussed with: patient        Subjective Evaluation    History of Present Illness  Date of onset: 2021  Mechanism of injury: Pt reports gradual insidious onset of LBP and LLE radicular sx's  Pt reports LLE pain and tingling numbness into the bottom of her L foot  Pt reports feeling tightness in her LLE and limited AROM  Pt has pain at night  Pt reports tylenol and advil provide no relief             Recurrent probem    Quality of life: fair    Pain  Current pain ratin  At best pain ratin  At worst pain rating: 10  Location: l/s, L hip, LLE, L foot  Quality: sharp  Relieving factors: change in position  Aggravating factors: sitting, standing, stair climbing, walking, lifting, running and overhead activity  Progression: no change    Social Support  Steps to enter house: yes  Stairs in house: yes   Lives in: multiple-level home  Lives with: spouse    Employment status: working  Exercise history: Pilates, yoga, hiking      Diagnostic Tests  X-ray: abnormal  Patient Goals  Patient goals for therapy: decreased pain, improved balance, increased motion, increased strength, independence with ADLs/IADLs and return to sport/leisure activities          Objective     Concurrent Complaints  Positive for night pain and disturbed sleep  Negative for bladder dysfunction, bowel dysfunction and saddle (S4) numbness    Static Posture   General Observations  Scoliosis  Neurological Testing     Sensation     Lumbar   Left   Intact: light touch    Right   Intact: light touch    Reflexes   Left   Patellar (L4): normal (2+)  Achilles (S1): normal (2+)    Right   Patellar (L4): absent (0)  Achilles (S1): trace (1+)    Active Range of Motion     Lumbar   Flexion:  WFL  Extension:  with pain Restriction level: moderate  Left lateral flexion:  WFL and with pain  Right lateral flexion:  WFL  Left rotation:  WFL  Right rotation:  [facility]  Mechanical Assessment    Cervical      Thoracic      Lumbar    Standing flexion: repeated movements   Pain location:no change  Lying flexion: repeated movements  Pain location: no change  Sitting flexion: repeated movements  Pain location: no change  Standing extension: repeated movements  Pain location: peripheralized  Pain intensity: worse  Left Sidegliding: repeated movements  Pain intensity: worse  Right sidegliding: repeated movements  Pain location: no change    Strength/Myotome Testing     Left Hip   Planes of Motion   Flexion: 4+  Abduction: 4+  Adduction: 4+    Right Hip   Planes of Motion   Flexion: 4+  Abduction: 4+  Adduction: 4+    Left Knee   Flexion: 4+  Extension: 4+    Right Knee   Flexion: 4+  Extension: 4+    Left Ankle/Foot   Dorsiflexion: 3+  Plantar flexion: 4  Great toe extension: 4+    Right Ankle/Foot   Dorsiflexion: 4+  Plantar flexion: 4+  Great toe extension: 4+    Tests     Lumbar   Positive SIJ compression  Negative sacroiliac distraction and Gaenslen's   Left   Positive crossed SLR and passive SLR     Negative slump test      Right   Negative crossed SLR, passive SLR and slump test      Left Pelvic Girdle/Sacrum   Positive: active SLR test      Right Pelvic Girdle/Sacrum   Negative: active SLR test      Left Hip   Negative ANTON  Right Hip   Negative ANTON  Functional Assessment      Squat    Left within functional limits and right within functional limits                Precautions: CKD    Clinical dx: flexion directional preference  Manuals 6/30                                                                Neuro Re-Ed             PPT 3x10                                                                                          Ther Ex             Standing lumbar flexion 3x10            Seated lumbar flexion 3x10            DKTC 3x10            SKTC             Zandra pose             Piriformis stretch                                       Ther Activity                                       Gait Training                                       Modalities

## 2022-07-06 ENCOUNTER — OFFICE VISIT (OUTPATIENT)
Dept: PHYSICAL THERAPY | Facility: CLINIC | Age: 47
End: 2022-07-06
Payer: COMMERCIAL

## 2022-07-06 DIAGNOSIS — M54.50 CHRONIC LEFT-SIDED LOW BACK PAIN WITHOUT SCIATICA: Primary | ICD-10-CM

## 2022-07-06 DIAGNOSIS — G89.29 CHRONIC LEFT-SIDED LOW BACK PAIN WITHOUT SCIATICA: Primary | ICD-10-CM

## 2022-07-06 PROCEDURE — 97112 NEUROMUSCULAR REEDUCATION: CPT

## 2022-07-06 PROCEDURE — 97110 THERAPEUTIC EXERCISES: CPT

## 2022-07-06 NOTE — PROGRESS NOTES
Daily Note     Today's date: 2022  Patient name: Iraj Ayala  : 1975  MRN: [id number]  Referring provider: JENNY Murphy  Dx:   Encounter Diagnosis     ICD-10-CM    1  Chronic left-sided low back pain without sciatica  M54 50     G89 29        Start Time: 1700  Stop Time: 1740  Total time in clinic (min): 40 minutes    Subjective: Patient states, "4-5/10" pain prior to therapy  Notes relief with forward flexion  However, it's not long lasting  Objective: See treatment diary below    Assessment: Initiated core stability program and progressed appropriately  She is responding well to the flexion based program  She does well with core stability exercises and should be progressed next visit  Plan: Continue per plan of care        Precautions: CKD    Clinical dx: flexion directional preference  Manuals                                                                Neuro Re-Ed             PPT 3x10 3x10 dc          PPT w/ hip add  5"x15 dc          PPT w/ hip abd  GTB  5"x15 dc          Clamshells  GTB  2x10 GTB  3x10          Bridges  3x15           Bird dogs  15"x3 ea           Dead bugs  Arm flexion 90°  Hip flexion 90°  3x10 ea           Plank   20"x3                                                  Ther Ex             Standing lumbar flexion 3x10 3x10           Seated lumbar flexion 3x10 3x10           DKTC 3x10 3x10           SKTC             Zandra pose             Piriformis stretch  20"x3 Lvl 2                                     Ther Activity                                       Gait Training                                       Modalities

## 2022-07-07 ENCOUNTER — OFFICE VISIT (OUTPATIENT)
Dept: NEPHROLOGY | Facility: CLINIC | Age: 47
End: 2022-07-07
Payer: COMMERCIAL

## 2022-07-07 VITALS
DIASTOLIC BLOOD PRESSURE: 60 MMHG | WEIGHT: 120 LBS | HEART RATE: 99 BPM | HEIGHT: 64 IN | SYSTOLIC BLOOD PRESSURE: 108 MMHG | BODY MASS INDEX: 20.49 KG/M2

## 2022-07-07 DIAGNOSIS — E88.09 HYPOALBUMINEMIA: ICD-10-CM

## 2022-07-07 DIAGNOSIS — R60.0 BILATERAL EDEMA OF LOWER EXTREMITY: ICD-10-CM

## 2022-07-07 DIAGNOSIS — N04.0 NEPHROTIC SYNDROME WITH LESION OF MINIMAL CHANGE GLOMERULONEPHRITIS: Primary | ICD-10-CM

## 2022-07-07 DIAGNOSIS — R80.9 PROTEINURIA, UNSPECIFIED TYPE: ICD-10-CM

## 2022-07-07 PROCEDURE — 99213 OFFICE O/P EST LOW 20 MIN: CPT | Performed by: PHYSICIAN ASSISTANT

## 2022-07-07 RX ORDER — TACROLIMUS 1 MG/1
CAPSULE ORAL
Qty: 150 CAPSULE | Refills: 4 | Status: SHIPPED | OUTPATIENT
Start: 2022-07-07 | End: 2022-10-19 | Stop reason: SDUPTHER

## 2022-07-07 NOTE — PROGRESS NOTES
Assessment and Plan:  Nephrotic syndrome due to minimal change disease:   -Diagnosed September 2019 w/good response to prednisone 10/1/19 to 4/4/20    -+relapse May '20, prednisone restarted 5/18/20 with relapse Dec  '20 during prednisone taper  Prednisone increased and Tacrolimus added 2/3/21  -prednisone weaned and off prednisone since June 2021  -Tacrolimus being weaned since February incrementally from 3 mg BID to 3 mg and 2 mg for Feb and March '22 and now Tacrolimus 2 mg BID  -Tacrolimus level 3 3 on 6/25/22  -will decrease Tacrolimus to 2 mg in am and 1 mg in pm   -continue weekly UPCr  -check CMP in August  -return to office in 4 months with Dr Johny Mixon with repeat labs, including renal function panel and CBC  Will recheck Tac level prior to appt    Bessy was seen today for follow-up and nephrotic syndrome  Diagnoses and all orders for this visit:    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  -     Basic metabolic panel; Future  -     CBC; Future  -     Phosphorus; Future  -     Magnesium; Future  -     Comprehensive metabolic panel; Future  -     Protein / creatinine ratio, urine; Standing  -     tacrolimus (PROGRAF) 1 mg capsule; Take 2 mg in the morning and 1 mg in the evening   -     Tacrolimus level; Future    Proteinuria, unspecified type  -     Protein / creatinine ratio, urine; Standing    Hypoalbuminemia    Bilateral edema of lower extremity        Follow up with Dr Johny Mixon in 4 months with repeat blood and urine studies  Please call the office with any questions or concerns  Reason for Visit: Follow-up and Nephrotic Syndrome    HPI: Meenu Montaño is a 41 y o  female with nephrotic syndrome due to minimal change disease who presents for follow up of nephrotic syndrome  Patient doing well without complaints with the exception of sciatica for which he is undergoing physical therapy  No recent hospitalizations or illnesses  No lower extremity edema    Recent creatinine stable at 0 54 and UPCr minimal at 0 075 g  Patient denies hematuria, edema, dyspnea, nausea, vomiting, diarrhea, fatigue, foamy urine  ROS: A complete review of systems was performed and was negative unless otherwise noted in the history of present illness  Allergies:   Patient has no known allergies  Medications:     Current Outpatient Medications:     Multiple Vitamin (MULTIVITAMIN ADULT PO), Take 1 tablet by mouth in the morning, Disp: , Rfl:     tacrolimus (PROGRAF) 1 mg capsule, Take 2 mg in the morning and 1 mg in the evening , Disp: 150 capsule, Rfl: 4    VITAMIN D, CHOLECALCIFEROL, PO, Take by mouth (Patient not taking: No sig reported), Disp: , Rfl:     Past Medical History:   Diagnosis Date    Fibroid     Hyperlipidemia     Nephrotic syndrome      Past Surgical History:   Procedure Laterality Date    IR BIOPSY KIDNEY MASS  9/25/2019    TUBAL LIGATION      WISDOM TOOTH EXTRACTION       Family History   Problem Relation Age of Onset    Heart failure Father     No Known Problems Mother     Diabetes Paternal Grandmother     No Known Problems Maternal Aunt     No Known Problems Maternal Aunt       reports that she has been smoking cigarettes  She has been smoking about 0 30 packs per day  She has never used smokeless tobacco  She reports previous alcohol use  She reports that she does not use drugs  Physical Exam:   Vitals:    07/07/22 1602   BP: 108/60   BP Location: Right arm   Patient Position: Sitting   Cuff Size: Child   Pulse: 99   Weight: 54 4 kg (120 lb)   Height: 5' 4" (1 626 m)     Body mass index is 20 6 kg/m²  General:  Awake, alert, appears comfortable and in no acute distress  Nontoxic  Skin:  No rash, warm, good skin turgor   Eyes:  PERRL, EOMI, sclerae nonicteric   no periorbital edema   ENT:  Moist mucous membranes  Neck:  Trachea midline, symmetric  No JVD  No carotid bruits    Chest:  Clear to auscultation bilaterally without wheezes, crackles or rhonchi  CVS:  Regular rate and rhythm without murmur, gallop or rub  S1 and S2 identified and normal   No S3, S4    Abdomen:  Soft, nontender, nondistended without masses  Normal bowel sounds x 4 quadrants  No bruit  Extremities:  Warm, pink, motor and sensory intact and well perfused  No cyanosis, pallor  No BLE edema  Neuro:  Awake, alert, oriented x3  Grossly intact  Psych:  Appropriate affect  Mentating appropriately  Normal mental status exam      Procedure:  No results found for this or any previous visit  Lab Results   Component Value Date    CALCIUM 9 5 06/25/2022    K 4 5 06/25/2022    CO2 30 06/25/2022     06/25/2022    BUN 12 06/25/2022    CREATININE 0 54 06/25/2022             Invalid input(s): ALBUMIN    EMR, including Epic, Care Everywhere and outside scanned documents reviewed  I have personally reviewed the blood work as stated above and in my note  I have personally reviewed PCP, consultants and prior nephrology notes

## 2022-07-07 NOTE — PATIENT INSTRUCTIONS
Your kidney function remains stable  Most recent creatinine 0 54  Prograf level 3 3  No significant protein in urine  Decrease tacrolimus to 2 mg in the morning and 1 mg at night  We will continue surveillance as outlined below  Avoid all NSAIDs to include ibuprofen, Motrin, Aleve, Advil, Naproxen, Celebrex, Indomethacin, Toradol  Stay well hydrated  Continue all prescribed medications with change in Tacrolimus as outlined above  Call if blood pressure consistently more than 140/90 or less than 110/50s  High and low blood pressures may affect your kidney function  Recommend low salt diet (2 gm sodium diet)  Please let us know if you are scheduled for any studies with IV contrast (ex: CT scan, arteriogram or cardiac catheterization)   Check monthly urine protein levels  Check blood tests (complete metabolic profile) in August/September 2022  Repeat blood work in 4 months prior to office appointment  Follow up in 4 months with Dr Zahira Christensen with repeat labs prior to appointment  Please contact the office with new symptoms or concerns

## 2022-07-14 ENCOUNTER — OFFICE VISIT (OUTPATIENT)
Dept: PHYSICAL THERAPY | Facility: CLINIC | Age: 47
End: 2022-07-14
Payer: COMMERCIAL

## 2022-07-14 DIAGNOSIS — G89.29 CHRONIC LEFT-SIDED LOW BACK PAIN WITHOUT SCIATICA: Primary | ICD-10-CM

## 2022-07-14 DIAGNOSIS — M54.50 CHRONIC LEFT-SIDED LOW BACK PAIN WITHOUT SCIATICA: Primary | ICD-10-CM

## 2022-07-14 PROCEDURE — 97110 THERAPEUTIC EXERCISES: CPT

## 2022-07-14 PROCEDURE — 97112 NEUROMUSCULAR REEDUCATION: CPT

## 2022-07-14 NOTE — PROGRESS NOTES
Daily Note     Today's date: 2022  Patient name: Nora Odonnell  : 1975  MRN: [id number]  Referring provider: JENNY Callahan  Dx:   Encounter Diagnosis     ICD-10-CM    1  Chronic left-sided low back pain without sciatica  M54 50     G89 29                   Subjective: Patient states she is avoiding lumbar extension with ADLs  States she has been trying to go for short walks, taking frequent breaks to do lumbar flexion exercises  Pt states when in flexed positions radicular sx's to L glute  Pt states when in extended position radicular sx's to posterior aspect of L knee  Objective: See treatment diary below    Assessment: Progressed core stability exercises  Pt demonstrates very good TrA contraction and good abdominal bracing  Continue neuro re ed exercises to improved core contraction carryover into ADLs  Pt continues to peripheralize with lumbar extension and centralize with lumbar flexion  Hold bridges 2/2 to increased lumbar ext with exercise  Plan: Continue per plan of care        Precautions: CKD    Clinical dx: flexion directional preference  Manuals           L l/s laser   4000J contact                                                 Neuro Re-Ed             PPT 3x10 3x10           Clamshells  GTB  2x10 BlaTB  3x10          Bird dogs  15"x3 ea 15"x3 ea          Dead bugs  Arm flexion 90°  Hip flexion 90°  3x10 ea 3x10 ea          Pball alt arm and knee   3x10                                    Ther Ex             Standing lumbar flexion 3x10 3x10 3x10          Seated lumbar flexion 3x10 3x10 3x10          DKTC 3x10 3x10 1x10          SKTC   1x10ea          Zandra pose             Piriformis stretch  20"x3 Lvl 2                                     Ther Activity                                       Gait Training                                       Modalities

## 2022-07-21 ENCOUNTER — OFFICE VISIT (OUTPATIENT)
Dept: PHYSICAL THERAPY | Facility: CLINIC | Age: 47
End: 2022-07-21
Payer: COMMERCIAL

## 2022-07-21 DIAGNOSIS — G89.29 CHRONIC LEFT-SIDED LOW BACK PAIN WITHOUT SCIATICA: Primary | ICD-10-CM

## 2022-07-21 DIAGNOSIS — M54.50 CHRONIC LEFT-SIDED LOW BACK PAIN WITHOUT SCIATICA: Primary | ICD-10-CM

## 2022-07-21 PROCEDURE — 97110 THERAPEUTIC EXERCISES: CPT

## 2022-07-21 PROCEDURE — 97140 MANUAL THERAPY 1/> REGIONS: CPT

## 2022-07-21 NOTE — PROGRESS NOTES
Daily Note     Today's date: 2022  Patient name: Tharon Sandhoff  : 1975  MRN: [id number]  Referring provider: JENNY Alvarado  Dx:   Encounter Diagnosis     ICD-10-CM    1  Chronic left-sided low back pain without sciatica  M54 50     G89 29                   Subjective: Patient reports no change in condition since last visit  States she feels okay in lumbar flexion positions  Pt reports pain onset within 1-2 minutes of standing and walking  Most distal point being top of foot  Objective: See treatment diary below    Assessment: Pt has no pain with lumbar flexed positions  Pain continues to onset within 1-2 minutes of lumbar extension positions  Unable to centralize sx's with lumbar flexion  Attempted traction, pain still onset within 1-2 minutes of standing  Pt educated to continue with HEP and avoid extended positions as much as possible, including riding bike instead of walking  Plan: Continue per plan of care        Precautions: CKD    Clinical dx: flexion directional preference  Manuals          L l/s laser   4000J contact 7200J contact         Traction    5 min                                   Neuro Re-Ed             PPT 3x10 3x10           Clamshells  GTB  2x10 BlaTB  3x10          Bird dogs  15"x3 ea 15"x3 ea          Dead bugs  Arm flexion 90°  Hip flexion 90°  3x10 ea 3x10 ea          Pball alt arm and knee   3x10                                    Ther Ex             Standing lumbar flexion 3x10 3x10 3x10 3x10         Seated lumbar flexion 3x10 3x10 3x10 3x10         DKTC 3x10 3x10 1x10 3x10         SKTC   1x10ea 3x10         Plow pose    1x10         Piriformis stretch                                       Ther Activity                                       Gait Training                                       Modalities

## 2022-07-27 ENCOUNTER — OFFICE VISIT (OUTPATIENT)
Dept: PHYSICAL THERAPY | Facility: CLINIC | Age: 47
End: 2022-07-27
Payer: COMMERCIAL

## 2022-07-27 DIAGNOSIS — M54.50 CHRONIC LEFT-SIDED LOW BACK PAIN WITHOUT SCIATICA: Primary | ICD-10-CM

## 2022-07-27 DIAGNOSIS — G89.29 CHRONIC LEFT-SIDED LOW BACK PAIN WITHOUT SCIATICA: Primary | ICD-10-CM

## 2022-07-27 PROCEDURE — 97110 THERAPEUTIC EXERCISES: CPT

## 2022-07-27 PROCEDURE — 97140 MANUAL THERAPY 1/> REGIONS: CPT

## 2022-07-27 NOTE — PROGRESS NOTES
Daily Note     Today's date: 2022  Patient name: Prince Marcus  : 1975  MRN: [id number]  Referring provider: JENNY Gonzalez  Dx:   Encounter Diagnosis     ICD-10-CM    1  Chronic left-sided low back pain without sciatica  M54 50     G89 29        Start Time: 1630  Stop Time: 1705  Total time in clinic (min): 35 minutes    Subjective: Patient continues to have L buttock and radicular symptoms down her LLE into the top of her foot  Her symptoms remain unchanged with therapy  Objective: See treatment diary below    Assessment: No change this visit with LLE traction, piriformis STM, RLE quad/LLE HS iso MET at 90/90  Patient advised to seek a spine specialist or pain management at this time as per Rene Rodriguez PT  Patent will continue to work on her core stabilization program at home as assigned  Plan: On hold as per patient and Rene Rodriguez PT until advised by a physician to continue therapy        Precautions: CKD    Clinical dx: flexion directional preference  Manuals         L l/s laser   4000J contact 7200J contact         Traction    5 min 5 min                                  Neuro Re-Ed             PPT 3x10 3x10           Clamshells  GTB  2x10 BlaTB  3x10  HEP        Bird dogs  15"x3 ea 15"x3 ea  HEP        Dead bugs  Arm flexion 90°  Hip flexion 90°  3x10 ea 3x10 ea  HEP        Pball alt arm and knee   3x10                                    Ther Ex             Standing lumbar flexion 3x10 3x10 3x10 3x10 3x10        Seated lumbar flexion 3x10 3x10 3x10 3x10 3x10        DKTC 3x10 3x10 1x10 3x10 3x10        SKTC   1x10ea 3x10 3x10        Plow pose    1x10         Piriformis stretch                                       Ther Activity                                       Gait Training                                       Modalities

## 2022-07-28 ENCOUNTER — APPOINTMENT (OUTPATIENT)
Dept: PHYSICAL THERAPY | Facility: CLINIC | Age: 47
End: 2022-07-28
Payer: COMMERCIAL

## 2022-08-05 ENCOUNTER — TELEPHONE (OUTPATIENT)
Dept: INTERNAL MEDICINE CLINIC | Facility: CLINIC | Age: 47
End: 2022-08-05

## 2022-08-05 DIAGNOSIS — M51.36 DEGENERATIVE DISC DISEASE, LUMBAR: Primary | ICD-10-CM

## 2022-08-05 NOTE — TELEPHONE ENCOUNTER
Pt calling in stating she has been going to PT for about a month with no improvement - was told a message was going to be sent to her PCP to advise on next steps, so she wanted to see if you received that message if not wants to know if she should see ortho or pain management?

## 2022-08-14 LAB
ALBUMIN SERPL-MCNC: 3.9 G/DL (ref 3.6–5.1)
ALBUMIN/GLOB SERPL: 1.6 (CALC) (ref 1–2.5)
ALP SERPL-CCNC: 36 U/L (ref 31–125)
ALT SERPL-CCNC: 12 U/L (ref 6–29)
AST SERPL-CCNC: 15 U/L (ref 10–35)
BILIRUB SERPL-MCNC: 1 MG/DL (ref 0.2–1.2)
BUN SERPL-MCNC: 14 MG/DL (ref 7–25)
BUN/CREAT SERPL: NORMAL (CALC) (ref 6–22)
CALCIUM SERPL-MCNC: 9 MG/DL (ref 8.6–10.2)
CHLORIDE SERPL-SCNC: 107 MMOL/L (ref 98–110)
CO2 SERPL-SCNC: 30 MMOL/L (ref 20–32)
CREAT SERPL-MCNC: 0.55 MG/DL (ref 0.5–0.99)
CREAT UR-MCNC: 141 MG/DL (ref 20–275)
GFR/BSA.PRED SERPLBLD CYS-BASED-ARV: 115 ML/MIN/1.73M2
GLOBULIN SER CALC-MCNC: 2.4 G/DL (CALC) (ref 1.9–3.7)
GLUCOSE SERPL-MCNC: 83 MG/DL (ref 65–99)
POTASSIUM SERPL-SCNC: 4.3 MMOL/L (ref 3.5–5.3)
PROT SERPL-MCNC: 6.3 G/DL (ref 6.1–8.1)
PROT UR-MCNC: 10 MG/DL (ref 5–24)
PROT/CREAT UR: 0.07 MG/MG CREAT (ref 0.02–0.16)
PROT/CREAT UR: 71 MG/G CREAT (ref 21–161)
SODIUM SERPL-SCNC: 141 MMOL/L (ref 135–146)

## 2022-08-16 ENCOUNTER — TELEPHONE (OUTPATIENT)
Dept: NEPHROLOGY | Facility: CLINIC | Age: 47
End: 2022-08-16

## 2022-08-16 NOTE — TELEPHONE ENCOUNTER
----- Message from Maya Britt PA-C sent at 8/15/2022  3:02 PM EDT -----  Labs reviewed and stable  Renal function remains stable  Urine protein level stable and minimal   Please call patient and let her know her labs are stable  F/u as scheduled with Dr Garret Haney  Thanks

## 2022-08-18 NOTE — TELEPHONE ENCOUNTER
Spoke with patient about the following, she expressed understanding and thanked us for the call:    Labs reviewed and stable  Renal function remains stable  Urine protein level stable and minimal   Please call patient and let her know her labs are stable  F/u as scheduled with Dr Arthur Day  Thanks

## 2022-09-08 ENCOUNTER — OFFICE VISIT (OUTPATIENT)
Dept: OBGYN CLINIC | Facility: CLINIC | Age: 47
End: 2022-09-08
Payer: COMMERCIAL

## 2022-09-08 VITALS
WEIGHT: 118.7 LBS | HEART RATE: 86 BPM | BODY MASS INDEX: 20.26 KG/M2 | SYSTOLIC BLOOD PRESSURE: 120 MMHG | HEIGHT: 64 IN | DIASTOLIC BLOOD PRESSURE: 83 MMHG

## 2022-09-08 DIAGNOSIS — M41.86 LEVOSCOLIOSIS OF LUMBAR SPINE: Primary | ICD-10-CM

## 2022-09-08 DIAGNOSIS — M54.16 LEFT LUMBAR RADICULOPATHY: ICD-10-CM

## 2022-09-08 DIAGNOSIS — M51.36 DEGENERATIVE DISC DISEASE, LUMBAR: ICD-10-CM

## 2022-09-08 PROCEDURE — 99204 OFFICE O/P NEW MOD 45 MIN: CPT | Performed by: ORTHOPAEDIC SURGERY

## 2022-09-08 RX ORDER — GABAPENTIN 100 MG/1
100 CAPSULE ORAL
Qty: 30 CAPSULE | Refills: 0 | Status: SHIPPED | OUTPATIENT
Start: 2022-09-08 | End: 2022-10-27 | Stop reason: ALTCHOICE

## 2022-09-08 NOTE — PROGRESS NOTES
Chief Complaint: left lower back pain    HPI:    Tuan Felipe is a 41year old Female who presents today for new evaluation and treatment of low back pain for a year  She was referred to us by her PCP, Dr Shar Nunes  The pain is aggravated by walking  She reported she was otherwise healthy until she noticed back pain after a lot of roller coaster rides in the summer  Left side worse than the right  She also reported left hip pain as well and reported radiation down to her ankle initially  The pain radiation resolved after physical therapy  She also complained sharp pain on the right while she walked downstairs  She went to her PCP and completed a month of physical therapy  She stated that the physical therapy did not provide much relief and stretching does help with the pain  She also denied injury to the back, muscle weakness, urinary/bowel incontinence,  saddle anethesia, fever, chills, or hx of cancer  The pain does disturb her sleep  She also tried tylenol and advils but did not provide much pain relief  If not injury related:gradual starting 1 year ago    Summary of treatment to-date: tylenol NSAIDS  with no improvement  Physical therapy for a month with mild improvement  I have personally reviewed pertinent films in PACS  X ray of the lumbar spine was reviewed and my interpretation was L1 L2 facet joint arthritis and degenerative changes  Scoliosis noted at the L1, L2, and T12 level      Patient Active Problem List   Diagnosis    Bilateral edema of lower extremity    Hypoalbuminemia    Proteinuria    Elevated brain natriuretic peptide (BNP) level    Uvular edema    Minimal change disease    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    Folliculitis    Neck mass    Acute streptococcal pharyngitis    Chronic left-sided low back pain without sciatica        Current Outpatient Medications on File Prior to Visit   Medication Sig Dispense Refill    Multiple Vitamin (MULTIVITAMIN ADULT PO) Take 1 tablet by mouth in the morning      tacrolimus (PROGRAF) 1 mg capsule Take 2 mg in the morning and 1 mg in the evening  150 capsule 4    VITAMIN D, CHOLECALCIFEROL, PO Take by mouth (Patient not taking: No sig reported)       No current facility-administered medications on file prior to visit  No Known Allergies     Tobacco Use: High Risk    Smoking Tobacco Use: Current Every Day Smoker    Smokeless Tobacco Use: Never Used        Social Determinants of Health     Tobacco Use: High Risk    Smoking Tobacco Use: Current Every Day Smoker    Smokeless Tobacco Use: Never Used   Alcohol Use: Not on file   Financial Resource Strain: Not on file   Food Insecurity: Not on file   Transportation Needs: Not on file   Physical Activity: Not on file   Stress: Not on file   Social Connections: Not on file   Intimate Partner Violence: Not on file   Depression: Not at risk    PHQ-2 Score: 0   Housing Stability: Not on file               Review of Systems   Constitutional: Negative for chills and fever  HENT: Negative for ear pain and sore throat  Eyes: Negative for pain and visual disturbance  Respiratory: Negative for cough and shortness of breath  Cardiovascular: Negative for chest pain and palpitations  Gastrointestinal: Negative for abdominal pain and vomiting  Genitourinary: Negative for dysuria and hematuria  Musculoskeletal: Positive for back pain  Negative for arthralgias  Skin: Negative for color change and rash  Neurological: Negative for seizures and syncope  All other systems reviewed and are negative  Body mass index is 20 37 kg/m²  Physical Exam  Constitutional:       Appearance: Normal appearance  HENT:      Head: Atraumatic  Eyes:      Conjunctiva/sclera: Conjunctivae normal    Cardiovascular:      Rate and Rhythm: Normal rate  Pulses: Normal pulses  Pulmonary:      Effort: Pulmonary effort is normal  No respiratory distress  Skin:     General: Skin is warm and dry  Coloration: Skin is not jaundiced  Neurological:      Mental Status: She is alert and oriented to person, place, and time  Psychiatric:         Mood and Affect: Mood normal          Behavior: Behavior normal           Ortho Exam:    Body part: back     Inspection: scoliosis noted of her lumbar spine  Shoulder and hip were at even level  Convexity to the left over the lumbar spine and thoracic spine  Leg lengths appeared to be the same both sides  Palpation: Tenderness over sciatic notch  Range of motion: intact forward flexion, extension and rotation   STRENGTH (bilateral):  Knee Extension: 5/5  Knee Flexion: 5/5  Foot Dorsiflexion: 5/5  Great Toe Extension: 5/5  Foot Plantarflexion: 5/5  Hip Flexion: 5/5    DERMATOMAL SENSATION:  L1: normal bilaterally  L2: normal bilaterally  L3: normal bilaterally  L4: normal bilaterally  L5: normal bilaterally  S1: normal bilaterally  REFLEXES:  Patellar: 2+ bilateral  Achilles: 2+ bilateral    BACK:   SUPINE STRAIGHT LEG: negative both sides    RIGHT HIP:  LOG ROLL: negative  ANTON: negative    LEFT HIP:  LOG ROLL: negative  ANTON: negative     Procedures       Assessment:     Diagnosis ICD-10-CM Associated Orders   1  Levoscoliosis of lumbar spine  M41 86 MRI lumbar spine wo contrast   2  Degenerative disc disease, lumbar  M51 36 Ambulatory Referral to Orthopedic Surgery     MRI lumbar spine wo contrast     gabapentin (Neurontin) 100 mg capsule   3  Left lumbar radiculopathy  M54 16 MRI lumbar spine wo contrast     gabapentin (Neurontin) 100 mg capsule        Plan:    I have explained and reviewed the imaging findings and clinical findings with Bessy  Her current clinical findings are consistent with left lumbar radiculopathy  I recommended her to use TENS machine over the counter to help with the pain as well as continuing tylenol and lidocaine patch   I also prescribed low dose gabapentin 100 mg QHS for her radiculopathy due to the concerns of her minimal change kidney disease on tacrolimus  I offered medro dose pack however she was concerned about the interaction with tacrolimus that she is currently taking  She already completed a month of physical therapy but her low back pain persists  Therefore, will order MRI at this time  Will consider refer her to pain management for possible epidural injection if above conservative management does not provide pain relief  Will also consider refer her to spine surgery for her scoliosis if the pain persists after all above non surgical management  She may follow up with us after the MRI result  Follow-Up:    Follow up after MRI result  I have spent 45 minutes with Patient  today in which greater than 50% of this time was spent in counseling/coordination of care regarding Diagnostic results, Prognosis, Risks and benefits of tx options, Intructions for management and Impressions  Charting, and collaboration of care  Portions of the record may have been created with voice recognition software  Occasional wrong word or "sound alike" substitutions may have occurred due to the inherent limitations of voice recognition software  Please review the chart carefully and recognize, using context, where substitutions/typographical errors may have occurred

## 2022-09-11 DIAGNOSIS — N04.0 NEPHROTIC SYNDROME WITH LESION OF MINIMAL CHANGE GLOMERULONEPHRITIS: Primary | ICD-10-CM

## 2022-09-11 LAB
CREAT UR-MCNC: 25 MG/DL (ref 20–275)
PROT UR-MCNC: 7 MG/DL (ref 5–24)
PROT/CREAT UR: 0.28 MG/MG CREAT (ref 0.02–0.18)
PROT/CREAT UR: 280 MG/G CREAT (ref 24–184)

## 2022-09-18 LAB
BUN SERPL-MCNC: 12 MG/DL (ref 7–25)
BUN/CREAT SERPL: NORMAL (CALC) (ref 6–22)
CALCIUM SERPL-MCNC: 9.4 MG/DL (ref 8.6–10.2)
CHLORIDE SERPL-SCNC: 104 MMOL/L (ref 98–110)
CO2 SERPL-SCNC: 27 MMOL/L (ref 20–32)
CREAT SERPL-MCNC: 0.55 MG/DL (ref 0.5–0.99)
CREAT UR-MCNC: 72 MG/DL (ref 20–275)
GFR/BSA.PRED SERPLBLD CYS-BASED-ARV: 115 ML/MIN/1.73M2
GLUCOSE SERPL-MCNC: 82 MG/DL (ref 65–139)
POTASSIUM SERPL-SCNC: 4.1 MMOL/L (ref 3.5–5.3)
PROT UR-MCNC: 39 MG/DL (ref 5–24)
PROT/CREAT UR: 0.54 MG/MG CREAT (ref 0.02–0.18)
PROT/CREAT UR: 542 MG/G CREAT (ref 24–184)
SODIUM SERPL-SCNC: 136 MMOL/L (ref 135–146)

## 2022-09-19 DIAGNOSIS — N04.0 NEPHROTIC SYNDROME WITH LESION OF MINIMAL CHANGE GLOMERULONEPHRITIS: Primary | ICD-10-CM

## 2022-09-19 DIAGNOSIS — R80.9 PROTEINURIA, UNSPECIFIED TYPE: ICD-10-CM

## 2022-09-23 ENCOUNTER — TELEPHONE (OUTPATIENT)
Dept: NEPHROLOGY | Facility: CLINIC | Age: 47
End: 2022-09-23

## 2022-09-23 DIAGNOSIS — N04.0 NEPHROTIC SYNDROME WITH LESION OF MINIMAL CHANGE GLOMERULONEPHRITIS: Primary | ICD-10-CM

## 2022-09-23 NOTE — TELEPHONE ENCOUNTER
Patient called stating she had spoken to you on Tuesday about placing orders for blood work in October  Please place orders if necessary

## 2022-09-29 ENCOUNTER — HOSPITAL ENCOUNTER (OUTPATIENT)
Dept: MRI IMAGING | Facility: HOSPITAL | Age: 47
Discharge: HOME | End: 2022-09-29
Attending: ORTHOPAEDIC SURGERY
Payer: COMMERCIAL

## 2022-09-29 DIAGNOSIS — M54.16 LEFT LUMBAR RADICULOPATHY: ICD-10-CM

## 2022-09-29 DIAGNOSIS — M41.86 LEVOSCOLIOSIS OF LUMBAR SPINE: ICD-10-CM

## 2022-09-29 DIAGNOSIS — M51.36 DEGENERATIVE DISC DISEASE, LUMBAR: ICD-10-CM

## 2022-09-29 PROCEDURE — 72148 MRI LUMBAR SPINE W/O DYE: CPT

## 2022-09-29 PROCEDURE — G1004 CDSM NDSC: HCPCS

## 2022-10-12 PROBLEM — J02.0 ACUTE STREPTOCOCCAL PHARYNGITIS: Status: RESOLVED | Noted: 2021-11-02 | Resolved: 2022-10-12

## 2022-10-14 LAB
CREAT UR-MCNC: 181 MG/DL (ref 20–275)
PROT UR-MCNC: 196 MG/DL (ref 5–24)
PROT/CREAT UR: 1.08 MG/MG CREAT (ref 0.02–0.18)
PROT/CREAT UR: 1083 MG/G CREAT (ref 24–184)

## 2022-10-19 ENCOUNTER — TELEPHONE (OUTPATIENT)
Dept: NEPHROLOGY | Facility: CLINIC | Age: 47
End: 2022-10-19

## 2022-10-19 DIAGNOSIS — N04.0 NEPHROTIC SYNDROME WITH LESION OF MINIMAL CHANGE GLOMERULONEPHRITIS: ICD-10-CM

## 2022-10-19 RX ORDER — TACROLIMUS 1 MG/1
2 CAPSULE ORAL EVERY 12 HOURS SCHEDULED
Qty: 150 CAPSULE | Refills: 4
Start: 2022-10-19

## 2022-10-19 NOTE — TELEPHONE ENCOUNTER
I called the patient and we spoke over the phone  Recent laboratory data were reviewed  UPC up to 1 083 from 0 542 in Sep 2022  She is currently on Tac 2 mg in AM and 1 mg in PM      Plan:  • Increase Tacrolimus back to 2 mg BID  • She has repeat labs next month and an appt with me in Nov 2022

## 2022-10-27 ENCOUNTER — OFFICE VISIT (OUTPATIENT)
Dept: OBGYN CLINIC | Facility: CLINIC | Age: 47
End: 2022-10-27
Payer: COMMERCIAL

## 2022-10-27 VITALS
BODY MASS INDEX: 19.97 KG/M2 | SYSTOLIC BLOOD PRESSURE: 120 MMHG | WEIGHT: 117 LBS | HEART RATE: 91 BPM | DIASTOLIC BLOOD PRESSURE: 77 MMHG | HEIGHT: 64 IN

## 2022-10-27 DIAGNOSIS — M54.16 LEFT LUMBAR RADICULOPATHY: Primary | ICD-10-CM

## 2022-10-27 DIAGNOSIS — M41.9 SCOLIOSIS OF LUMBAR SPINE, UNSPECIFIED SCOLIOSIS TYPE: ICD-10-CM

## 2022-10-27 PROCEDURE — 99213 OFFICE O/P EST LOW 20 MIN: CPT | Performed by: ORTHOPAEDIC SURGERY

## 2022-10-27 NOTE — PROGRESS NOTES
Chief Complaint:  Left lower back pain and gluteal pain    HPI:  Zaira Santos is a 41year old Female who presents today for follow-up of left-sided low back pain and gluteal pain    Description of symptoms:  Patient was earlier seen in this regard on 9/8/2022 when she complained of lower back pain for about 1 year duration  Symptoms were aggravated earlier this year after doing roller coaster rides in the summer  Clinical evaluation had revealed a lumbar scoliotic deformity as well as lumbar spine multilevel spondylotic changes  Patient was recommended to use low-dose gabapentin for symptomatic relief  However, patient was reluctant to start this medication and has not taken this medication  She does have a known history of 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 and she is currently on immunosuppressant therapy in this regard  Due to significant symptom persistence an MRI of the lumbar spine was requested and subsequently performed on 9/29/2022  This has been reported as:  "Transitional lumbosacral junction  The L5 vertebral body is partially sacralized bilaterally and has a somewhat sacral configuration with only a small rudimentary disc between L5 and S1      Moderate S-shaped scoliosis of the lumbar spine  Lumbar degenerative changes primarily right-sided at L2-3 with moderate right foraminal narrowing  At L3-4 there is mild left foraminal narrowing  At L4-5 there is severe left-sided foraminal narrowing   with compression of the L5 nerve and mild mass effect upon the S1 nerve as it exits the thecal sac  Correlate for corresponding radiculopathy "  Symptomatically, patient's main concern is left-sided lower back pain which radiates to her left gluteal area at this time  She denies any right-sided significant low back pain or radicular symptoms  She also denies any new onset bowel or bladder control problems or lower extremity weakness at this time        I have personally reviewed pertinent films in PACS  Patient Active Problem List   Diagnosis   • Bilateral edema of lower extremity   • Hypoalbuminemia   • Proteinuria   • Elevated brain natriuretic peptide (BNP) level   • Uvular edema   • Minimal change disease   • 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   • Folliculitis   • Neck mass   • Chronic left-sided low back pain without sciatica        Current Outpatient Medications on File Prior to Visit   Medication Sig Dispense Refill   • Multiple Vitamin (MULTIVITAMIN ADULT PO) Take 1 tablet by mouth in the morning     • tacrolimus (PROGRAF) 1 mg capsule Take 2 capsules (2 mg total) by mouth every 12 (twelve) hours Take 2 mg in the morning and 1 mg in the evening  150 capsule 4   • VITAMIN D, CHOLECALCIFEROL, PO Take by mouth (Patient not taking: No sig reported)     • [DISCONTINUED] gabapentin (Neurontin) 100 mg capsule Take 1 capsule (100 mg total) by mouth daily at bedtime (Patient not taking: Reported on 10/27/2022) 30 capsule 0     No current facility-administered medications on file prior to visit  No Known Allergies     Tobacco Use: High Risk   • Smoking Tobacco Use: Current Every Day Smoker   • Smokeless Tobacco Use: Never Used        Social Determinants of Health     Tobacco Use: High Risk   • Smoking Tobacco Use: Current Every Day Smoker   • Smokeless Tobacco Use: Never Used   Alcohol Use: Not on file   Financial Resource Strain: Not on file   Food Insecurity: Not on file   Transportation Needs: Not on file   Physical Activity: Not on file   Stress: Not on file   Social Connections: Not on file   Intimate Partner Violence: Not on file   Depression: Not on file   Housing Stability: Not on file               Review of Systems     Body mass index is 20 08 kg/m²  Physical Exam  Vitals and nursing note reviewed  HENT:      Head: Atraumatic     Eyes:      Conjunctiva/sclera: Conjunctivae normal    Cardiovascular:      Rate and Rhythm: Normal rate       Pulses: Normal pulses  Pulmonary:      Effort: Pulmonary effort is normal  No respiratory distress  Neurological:      Mental Status: She is alert and oriented to person, place, and time  Psychiatric:         Mood and Affect: Mood normal          Behavior: Behavior normal           Ortho Exam:    Body part: Lumbar spine     Inspection:  Lumbar levoscoliosis is noted  No significant shoulder or iliac crest asymmetry noted and no significant leg-length discrepancy  Palpation:  Tender to palpation at the L4-L5 and the L5-S1 levels including the left sciatic notch in the left lower lumbar paraspinal area    Range of motion:  Limited range of spine extension and some discomfort with flexion as well    Special Tests:  SLR is negative on the right and positive on the left at approximately 80°   ANTON does not induce any significant low back discomfort  Distal Neurovascular Status: Intact, Yes  No focal neurological deficits noted of bilateral lower extremities on today's evaluation  Procedures       Assessment:     Diagnosis ICD-10-CM Associated Orders   1  Left lumbar radiculopathy  M54 16 Nerve Stimulator (TENS Therapy Pain Relief) EDGAR   2  Scoliosis of lumbar spine, unspecified scoliosis type  M41 9         Plan:    I had a detailed discussion with the patient and explained to her the recent lumbar spine MRI findings  Her current symptoms correlate with left-sided L5 and S1 root compression without any significant sensory motor deficit  In this regard, I will suggest her to use ESTIM  She is reluctant to try oral pain medications due to her underlying renal dysfunction  If symptoms significantly persist she could consider further evaluation through pain management for consideration of lumbar epidural corticosteroid injection  We participated in shared decision making and the patient was agreeable with this plan of management              Portions of the record may have been created with voice recognition software  Occasional wrong word or "sound alike" substitutions may have occurred due to the inherent limitations of voice recognition software  Please review the chart carefully and recognize, using context, where substitutions/typographical errors may have occurred

## 2022-11-14 LAB
BASOPHILS # BLD AUTO: 60 CELLS/UL (ref 0–200)
BASOPHILS NFR BLD AUTO: 0.8 %
BUN SERPL-MCNC: 12 MG/DL (ref 7–25)
BUN/CREAT SERPL: NORMAL (CALC) (ref 6–22)
CALCIUM SERPL-MCNC: 8.8 MG/DL (ref 8.6–10.2)
CHLORIDE SERPL-SCNC: 105 MMOL/L (ref 98–110)
CO2 SERPL-SCNC: 25 MMOL/L (ref 20–32)
CREAT SERPL-MCNC: 0.51 MG/DL (ref 0.5–0.99)
CREAT UR-MCNC: 120 MG/DL (ref 20–275)
EOSINOPHIL # BLD AUTO: 218 CELLS/UL (ref 15–500)
EOSINOPHIL NFR BLD AUTO: 2.9 %
ERYTHROCYTE [DISTWIDTH] IN BLOOD BY AUTOMATED COUNT: 11.5 % (ref 11–15)
GFR/BSA.PRED SERPLBLD CYS-BASED-ARV: 117 ML/MIN/1.73M2
GLUCOSE SERPL-MCNC: 77 MG/DL (ref 65–99)
HCT VFR BLD AUTO: 38.2 % (ref 35–45)
HGB BLD-MCNC: 13 G/DL (ref 11.7–15.5)
LYMPHOCYTES # BLD AUTO: 2153 CELLS/UL (ref 850–3900)
LYMPHOCYTES NFR BLD AUTO: 28.7 %
MAGNESIUM SERPL-MCNC: 1.7 MG/DL (ref 1.5–2.5)
MCH RBC QN AUTO: 30.6 PG (ref 27–33)
MCHC RBC AUTO-ENTMCNC: 34 G/DL (ref 32–36)
MCV RBC AUTO: 89.9 FL (ref 80–100)
MONOCYTES # BLD AUTO: 690 CELLS/UL (ref 200–950)
MONOCYTES NFR BLD AUTO: 9.2 %
NEUTROPHILS # BLD AUTO: 4380 CELLS/UL (ref 1500–7800)
NEUTROPHILS NFR BLD AUTO: 58.4 %
PHOSPHATE SERPL-MCNC: 3.4 MG/DL (ref 2.5–4.5)
PLATELET # BLD AUTO: 309 THOUSAND/UL (ref 140–400)
PMV BLD REES-ECKER: 9.1 FL (ref 7.5–12.5)
POTASSIUM SERPL-SCNC: 4.3 MMOL/L (ref 3.5–5.3)
PROT UR-MCNC: 176 MG/DL (ref 5–24)
PROT/CREAT UR: 1.47 MG/MG CREAT (ref 0.02–0.18)
PROT/CREAT UR: 1467 MG/G CREAT (ref 24–184)
RBC # BLD AUTO: 4.25 MILLION/UL (ref 3.8–5.1)
SODIUM SERPL-SCNC: 137 MMOL/L (ref 135–146)
TACROLIMUS BLD-MCNC: 2.7 MCG/L
WBC # BLD AUTO: 7.5 THOUSAND/UL (ref 3.8–10.8)

## 2022-11-29 ENCOUNTER — OFFICE VISIT (OUTPATIENT)
Dept: NEPHROLOGY | Facility: CLINIC | Age: 47
End: 2022-11-29

## 2022-11-29 VITALS
SYSTOLIC BLOOD PRESSURE: 108 MMHG | WEIGHT: 120 LBS | DIASTOLIC BLOOD PRESSURE: 80 MMHG | BODY MASS INDEX: 20.49 KG/M2 | HEIGHT: 64 IN | HEART RATE: 90 BPM

## 2022-11-29 DIAGNOSIS — N05.0 MINIMAL CHANGE DISEASE: ICD-10-CM

## 2022-11-29 DIAGNOSIS — N04.0 NEPHROTIC SYNDROME WITH LESION OF MINIMAL CHANGE GLOMERULONEPHRITIS: Primary | ICD-10-CM

## 2022-11-29 DIAGNOSIS — R80.9 PROTEINURIA, UNSPECIFIED TYPE: ICD-10-CM

## 2022-11-29 RX ORDER — FEXOFENADINE HYDROCHLORIDE 60 MG/1
60 TABLET, FILM COATED ORAL DAILY
COMMUNITY

## 2022-11-29 RX ORDER — MULTIVIT WITH MINERALS/LUTEIN
1000 TABLET ORAL DAILY
COMMUNITY

## 2022-11-29 NOTE — PROGRESS NOTES
NEPHROLOGY OFFICE PROGRESS NOTE   Linus Blanca 41 y o  female MRN: [id number]  DATE: 11/29/22  Reason for visit: Continued evaluation of nephrotic syndrome    ASSESSMENT & PLAN:   1  Nephrotic syndrome due to minimal change disease:  · Summary: Diagnosed in Sep 2019 and responded well to Prednisone - Was treated with Prednisone from 10/1/19 to 4/4/20  Had relapse of SHANTEL in May 2020 and restarted Prednisone on 5/18/20  Unfortunately, she had a relapse in Dec 2020 while Prednisone was being titrated down  Prednisone was increased and Tacrolimus was added on 2/3/21  · Since then, Prednisone has been weaned off - has been off since June 2021  · Remission was maintained with Tacrolimus 3 mg BID  · Unfortunately, proteinuria has worsened once Tacrolimus was lowered to 2 mg in AM and 1 mg in PM    · She is now back on Tacrolimus 2 mg BID  · I recommend starting Rituximab 1000 mg IV x 2 doses 14 days apart and plan to completely wean off Tacrolimus  · We went over risks and benefits of this and I gave her information on Rituximab - she will get back to us with the final answer  · I would like to have her seen by Dr Noemí Kim as a second opinion  2  Prophylaxis:  · Continue Vitamin D3 1000 units once a day  3  Hyperlipidemia:  · Resolved  · Off Atorvastatin since December 2019      Patient Instructions   I recommend that you start Rituximab - we will give you the information for this and please review this  Once you decide, please let us know so we can arrange for it  Once Rituximab is given, we can wean down your Tacrolimus further  Please Dr Noemí Kim for a second opinion  Follow up in 4 months  SUBJECTIVE / INTERVAL HISTORY:  Nicolás Cho was last seen in July 2022  No hospital stays or ER visits  Since last visit, we have weaned down her Tacrolimus  Unfortunately, her proteinuria worsened when we lowered her to 2 mg in AM and 1 mg in PM    She is currently back on 2 mg BID     No edema  No acute complaints  PMH/PSH: SHANTEL  Previous work up:   September 25, 2019 Renal biopsy:  1  Minimal change disease  2  Arteriosclerosis, mild to moderate  9/23/19: ZAYNAB negative, KL ratio 0 88, UPEP No M spike, SPEP No M spike, Hep B and C NR, C3 127, C4 30, ANCA negative, anti PLA2R Ab negative    9/23/19 Renal US: R 11 8 cm, L 11 4 cm, no hydronephrosis  ALLERGIES: No Known Allergies    REVIEW OF SYSTEMS:  Review of Systems   Constitutional: Negative for appetite change, chills, fatigue and fever  Respiratory: Negative for cough and shortness of breath  Cardiovascular: Negative for chest pain and leg swelling  Gastrointestinal: Negative for abdominal pain, diarrhea, nausea and vomiting  Genitourinary: Negative for dysuria and hematuria  Musculoskeletal: Negative for arthralgias and back pain  Neurological: Negative for dizziness and light-headedness  OBJECTIVE:  /80 (BP Location: Left arm, Patient Position: Sitting, Cuff Size: Standard)   Pulse 90   Ht 5' 4" (1 626 m)   Wt 54 4 kg (120 lb)   BMI 20 60 kg/m²   Current Weight: Weight - Scale: 54 4 kg (120 lb) Body mass index is 20 6 kg/m²  Physical Exam  Constitutional:       General: She is not in acute distress  Appearance: Normal appearance  She is well-developed, normal weight and well-nourished  She is not ill-appearing or diaphoretic  HENT:      Head: Normocephalic and atraumatic  Mouth/Throat:      Mouth: Mucous membranes are normal    Eyes:      General: No scleral icterus  Conjunctiva/sclera: Conjunctivae normal    Neck:      Vascular: No JVD  Cardiovascular:      Rate and Rhythm: Normal rate and regular rhythm  Heart sounds: Normal heart sounds  Pulmonary:      Effort: Pulmonary effort is normal       Breath sounds: Normal breath sounds  Abdominal:      General: Bowel sounds are normal       Palpations: Abdomen is soft  Musculoskeletal:         General: No edema        Right lower leg: No edema  Left lower leg: No edema  Skin:     General: Skin is warm and dry  Neurological:      Mental Status: She is alert and oriented to person, place, and time  Psychiatric:         Mood and Affect: Mood and affect and mood normal          Behavior: Behavior normal  Behavior is cooperative  Judgment: Judgment normal        Medications:  Current Outpatient Medications:   •  Ascorbic Acid (vitamin C) 1000 MG tablet, Take 1,000 mg by mouth daily, Disp: , Rfl:   •  fexofenadine (ALLEGRA) 60 MG tablet, Take 60 mg by mouth daily, Disp: , Rfl:   •  Multiple Vitamin (MULTIVITAMIN ADULT PO), Take 1 tablet by mouth in the morning, Disp: , Rfl:   •  tacrolimus (PROGRAF) 1 mg capsule, Take 2 capsules (2 mg total) by mouth every 12 (twelve) hours Take 2 mg in the morning and 1 mg in the evening   (Patient taking differently: Take 2 mg by mouth every 12 (twelve) hours), Disp: 150 capsule, Rfl: 4  •  VITAMIN D, CHOLECALCIFEROL, PO, Take by mouth, Disp: , Rfl:   •  Nerve Stimulator (TENS Therapy Pain Relief) EDGAR, Use 1 application 2 (two) times a day as needed (For left lumbar radiculopathy), Disp: 1 each, Rfl: 0    Laboratory Results:  Results for orders placed or performed in visit on 11/12/22   Magnesium   Result Value Ref Range    Magnesium, Serum 1 7 1 5 - 2 5 mg/dL   Phosphorus   Result Value Ref Range    Phosphorus, Serum 3 4 2 5 - 4 5 mg/dL   Basic metabolic panel   Result Value Ref Range    Glucose, Random 77 65 - 99 mg/dL    BUN 12 7 - 25 mg/dL    Creatinine 0 51 0 50 - 0 99 mg/dL    eGFR 117 > OR = 60 mL/min/1 73m2    SL AMB BUN/CREATININE RATIO NOT APPLICABLE 6 - 22 (calc)    Sodium 137 135 - 146 mmol/L    Potassium 4 3 3 5 - 5 3 mmol/L    Chloride 105 98 - 110 mmol/L    CO2 25 20 - 32 mmol/L    Calcium 8 8 8 6 - 10 2 mg/dL   CBC and differential   Result Value Ref Range    White Blood Cell Count 7 5 3 8 - 10 8 Thousand/uL    Red Blood Cell Count 4 25 3 80 - 5 10 Million/uL Hemoglobin 13 0 11 7 - 15 5 g/dL    HCT 38 2 35 0 - 45 0 %    MCV 89 9 80 0 - 100 0 fL    MCH 30 6 27 0 - 33 0 pg    MCHC 34 0 32 0 - 36 0 g/dL    RDW 11 5 11 0 - 15 0 %    Platelet Count 345 714 - 400 Thousand/uL    SL AMB MPV 9 1 7 5 - 12 5 fL    Neutrophils (Absolute) 4,380 1,500 - 7,800 cells/uL    Lymphocytes (Absolute) 2,153 850 - 3,900 cells/uL    Monocytes (Absolute) 690 200 - 950 cells/uL    Eosinophils (Absolute) 218 15 - 500 cells/uL    Basophils ABS 60 0 - 200 cells/uL    Neutrophils 58 4 %    Lymphocytes 28 7 %    Monocytes 9 2 %    Eosinophils 2 9 %    Basophils PCT 0 8 %    Always Message     Protein, Total w/Creat, Random Urine   Result Value Ref Range    Creatinine, Urine 120 20 - 275 mg/dL    Protein/Creat Ratio 1,467 (H) 24 - 184 mg/g creat    Protein/Creat Ratio 1 467 (H) 0 024 - 0 184 mg/mg creat    Total Protein, Urine 176 (H) 5 - 24 mg/dL   Tacrolimus level   Result Value Ref Range    Tacrolimus, Highly Sensitive, LC/MS/MS 2 7 (L) mcg/L

## 2022-11-29 NOTE — PATIENT INSTRUCTIONS
I recommend that you start Rituximab - we will give you the information for this and please review this  Once you decide, please let us know so we can arrange for it  Once Rituximab is given, we can wean down your Tacrolimus further  Please Dr Jaime Chowdary for a second opinion  Follow up in 4 months

## 2022-11-29 NOTE — LETTER
November 29, 2022     [facility], [address] [place] [address]    Patient: Gaviota Sosa   YOB: 1975   Date of Visit: 11/29/2022       Dear Dr Francis Gonzalez: Thank you for referring Apurva Hunt to me for evaluation  Below are my notes for this consultation  If you have questions, please do not hesitate to call me  I look forward to following your patient along with you  Sincerely,        Frankey Heard, MD        CC: No Recipients  Frankey Heard, MD  11/29/2022  5:15 PM  Sign when Signing Visit  NEPHROLOGY OFFICE PROGRESS NOTE   Gaviota Sosa 41 y o  female MRN: [id number]  DATE: 11/29/22  Reason for visit: Continued evaluation of nephrotic syndrome    ASSESSMENT & PLAN:   1  Nephrotic syndrome due to minimal change disease:  · Summary: Diagnosed in Sep 2019 and responded well to Prednisone - Was treated with Prednisone from 10/1/19 to 4/4/20  Had relapse of SHANTEL in May 2020 and restarted Prednisone on 5/18/20  Unfortunately, she had a relapse in Dec 2020 while Prednisone was being titrated down  Prednisone was increased and Tacrolimus was added on 2/3/21  · Since then, Prednisone has been weaned off - has been off since June 2021  · Remission was maintained with Tacrolimus 3 mg BID  · Unfortunately, proteinuria has worsened once Tacrolimus was lowered to 2 mg in AM and 1 mg in PM    · She is now back on Tacrolimus 2 mg BID  · I recommend starting Rituximab 1000 mg IV x 2 doses 14 days apart and plan to completely wean off Tacrolimus  · We went over risks and benefits of this and I gave her information on Rituximab - she will get back to us with the final answer  · I would like to have her seen by Dr Francis Gonzalez as a second opinion  2  Prophylaxis:  · Continue Vitamin D3 1000 units once a day  3  Hyperlipidemia:  · Resolved     · Off Atorvastatin since December 2019      Patient Instructions   I recommend that you start Rituximab - we will give you the information for this and please review this  Once you decide, please let us know so we can arrange for it  Once Rituximab is given, we can wean down your Tacrolimus further  Please Dr Brenton Valentino for a second opinion  Follow up in 4 months  SUBJECTIVE / INTERVAL HISTORY:  Fidel Chaparro was last seen in July 2022  No hospital stays or ER visits  Since last visit, we have weaned down her Tacrolimus  Unfortunately, her proteinuria worsened when we lowered her to 2 mg in AM and 1 mg in PM    She is currently back on 2 mg BID  No edema  No acute complaints  PMH/PSH: SHANTEL  Previous work up:   September 25, 2019 Renal biopsy:  1  Minimal change disease  2  Arteriosclerosis, mild to moderate  9/23/19: ZAYNAB negative, KL ratio 0 88, UPEP No M spike, SPEP No M spike, Hep B and C NR, C3 127, C4 30, ANCA negative, anti PLA2R Ab negative    9/23/19 Renal US: R 11 8 cm, L 11 4 cm, no hydronephrosis  ALLERGIES: No Known Allergies    REVIEW OF SYSTEMS:  Review of Systems   Constitutional: Negative for appetite change, chills, fatigue and fever  Respiratory: Negative for cough and shortness of breath  Cardiovascular: Negative for chest pain and leg swelling  Gastrointestinal: Negative for abdominal pain, diarrhea, nausea and vomiting  Genitourinary: Negative for dysuria and hematuria  Musculoskeletal: Negative for arthralgias and back pain  Neurological: Negative for dizziness and light-headedness  OBJECTIVE:  /80 (BP Location: Left arm, Patient Position: Sitting, Cuff Size: Standard)   Pulse 90   Ht 5' 4" (1 626 m)   Wt 54 4 kg (120 lb)   BMI 20 60 kg/m²   Current Weight: Weight - Scale: 54 4 kg (120 lb) Body mass index is 20 6 kg/m²  Physical Exam  Constitutional:       General: She is not in acute distress  Appearance: Normal appearance  She is well-developed, normal weight and well-nourished  She is not ill-appearing or diaphoretic     HENT:      Head: Normocephalic and atraumatic  Mouth/Throat:      Mouth: Mucous membranes are normal    Eyes:      General: No scleral icterus  Conjunctiva/sclera: Conjunctivae normal    Neck:      Vascular: No JVD  Cardiovascular:      Rate and Rhythm: Normal rate and regular rhythm  Heart sounds: Normal heart sounds  Pulmonary:      Effort: Pulmonary effort is normal       Breath sounds: Normal breath sounds  Abdominal:      General: Bowel sounds are normal       Palpations: Abdomen is soft  Musculoskeletal:         General: No edema  Right lower leg: No edema  Left lower leg: No edema  Skin:     General: Skin is warm and dry  Neurological:      Mental Status: She is alert and oriented to person, place, and time  Psychiatric:         Mood and Affect: Mood and affect and mood normal          Behavior: Behavior normal  Behavior is cooperative  Judgment: Judgment normal        Medications:  Current Outpatient Medications:   •  Ascorbic Acid (vitamin C) 1000 MG tablet, Take 1,000 mg by mouth daily, Disp: , Rfl:   •  fexofenadine (ALLEGRA) 60 MG tablet, Take 60 mg by mouth daily, Disp: , Rfl:   •  Multiple Vitamin (MULTIVITAMIN ADULT PO), Take 1 tablet by mouth in the morning, Disp: , Rfl:   •  tacrolimus (PROGRAF) 1 mg capsule, Take 2 capsules (2 mg total) by mouth every 12 (twelve) hours Take 2 mg in the morning and 1 mg in the evening   (Patient taking differently: Take 2 mg by mouth every 12 (twelve) hours), Disp: 150 capsule, Rfl: 4  •  VITAMIN D, CHOLECALCIFEROL, PO, Take by mouth, Disp: , Rfl:   •  Nerve Stimulator (TENS Therapy Pain Relief) EDGAR, Use 1 application 2 (two) times a day as needed (For left lumbar radiculopathy), Disp: 1 each, Rfl: 0    Laboratory Results:  Results for orders placed or performed in visit on 11/12/22   Magnesium   Result Value Ref Range    Magnesium, Serum 1 7 1 5 - 2 5 mg/dL   Phosphorus   Result Value Ref Range    Phosphorus, Serum 3 4 2 5 - 4 5 mg/dL   Basic metabolic panel   Result Value Ref Range    Glucose, Random 77 65 - 99 mg/dL    BUN 12 7 - 25 mg/dL    Creatinine 0 51 0 50 - 0 99 mg/dL    eGFR 117 > OR = 60 mL/min/1 73m2    SL AMB BUN/CREATININE RATIO NOT APPLICABLE 6 - 22 (calc)    Sodium 137 135 - 146 mmol/L    Potassium 4 3 3 5 - 5 3 mmol/L    Chloride 105 98 - 110 mmol/L    CO2 25 20 - 32 mmol/L    Calcium 8 8 8 6 - 10 2 mg/dL   CBC and differential   Result Value Ref Range    White Blood Cell Count 7 5 3 8 - 10 8 Thousand/uL    Red Blood Cell Count 4 25 3 80 - 5 10 Million/uL    Hemoglobin 13 0 11 7 - 15 5 g/dL    HCT 38 2 35 0 - 45 0 %    MCV 89 9 80 0 - 100 0 fL    MCH 30 6 27 0 - 33 0 pg    MCHC 34 0 32 0 - 36 0 g/dL    RDW 11 5 11 0 - 15 0 %    Platelet Count 924 891 - 400 Thousand/uL    SL AMB MPV 9 1 7 5 - 12 5 fL    Neutrophils (Absolute) 4,380 1,500 - 7,800 cells/uL    Lymphocytes (Absolute) 2,153 850 - 3,900 cells/uL    Monocytes (Absolute) 690 200 - 950 cells/uL    Eosinophils (Absolute) 218 15 - 500 cells/uL    Basophils ABS 60 0 - 200 cells/uL    Neutrophils 58 4 %    Lymphocytes 28 7 %    Monocytes 9 2 %    Eosinophils 2 9 %    Basophils PCT 0 8 %    Always Message     Protein, Total w/Creat, Random Urine   Result Value Ref Range    Creatinine, Urine 120 20 - 275 mg/dL    Protein/Creat Ratio 1,467 (H) 24 - 184 mg/g creat    Protein/Creat Ratio 1 467 (H) 0 024 - 0 184 mg/mg creat    Total Protein, Urine 176 (H) 5 - 24 mg/dL   Tacrolimus level   Result Value Ref Range    Tacrolimus, Highly Sensitive, LC/MS/MS 2 7 (L) mcg/L

## 2022-12-30 ENCOUNTER — TELEPHONE (OUTPATIENT)
Dept: NEPHROLOGY | Facility: CLINIC | Age: 47
End: 2022-12-30

## 2023-01-01 LAB
ALBUMIN SERPL-MCNC: 3.7 G/DL (ref 3.6–5.1)
ALBUMIN/GLOB SERPL: 1.4 (CALC) (ref 1–2.5)
ALP SERPL-CCNC: 44 U/L (ref 31–125)
ALT SERPL-CCNC: 22 U/L (ref 6–29)
APPEARANCE UR: CLEAR
AST SERPL-CCNC: 26 U/L (ref 10–35)
BACTERIA UR QL AUTO: ABNORMAL /HPF
BASOPHILS # BLD AUTO: 77 CELLS/UL (ref 0–200)
BASOPHILS NFR BLD AUTO: 0.9 %
BILIRUB SERPL-MCNC: 1.1 MG/DL (ref 0.2–1.2)
BILIRUB UR QL STRIP: NEGATIVE
BUN SERPL-MCNC: 9 MG/DL (ref 7–25)
BUN/CREAT SERPL: NORMAL (CALC) (ref 6–22)
CALCIUM SERPL-MCNC: 8.8 MG/DL (ref 8.6–10.2)
CHLORIDE SERPL-SCNC: 105 MMOL/L (ref 98–110)
CO2 SERPL-SCNC: 26 MMOL/L (ref 20–32)
COLOR UR: YELLOW
CREAT SERPL-MCNC: 0.5 MG/DL (ref 0.5–0.99)
CREAT UR-MCNC: 110 MG/DL (ref 20–275)
EOSINOPHIL # BLD AUTO: 576 CELLS/UL (ref 15–500)
EOSINOPHIL NFR BLD AUTO: 6.7 %
ERYTHROCYTE [DISTWIDTH] IN BLOOD BY AUTOMATED COUNT: 11.8 % (ref 11–15)
GFR/BSA.PRED SERPLBLD CYS-BASED-ARV: 118 ML/MIN/1.73M2
GLOBULIN SER CALC-MCNC: 2.6 G/DL (CALC) (ref 1.9–3.7)
GLUCOSE SERPL-MCNC: 78 MG/DL (ref 65–99)
GLUCOSE UR QL STRIP: NEGATIVE
HAV AB SER QL IA: ABNORMAL
HBV CORE AB SERPL QL IA: ABNORMAL
HBV SURFACE AB SER QL IA: REACTIVE
HBV SURFACE AG SERPL QL IA: ABNORMAL
HCT VFR BLD AUTO: 39.9 % (ref 35–45)
HCV AB S/CO SERPL IA: 0.02
HCV AB SERPL QL IA: ABNORMAL
HGB BLD-MCNC: 13.6 G/DL (ref 11.7–15.5)
HGB UR QL STRIP: NEGATIVE
HYALINE CASTS #/AREA URNS LPF: ABNORMAL /LPF
KETONES UR QL STRIP: NEGATIVE
LEUKOCYTE ESTERASE UR QL STRIP: NEGATIVE
LYMPHOCYTES # BLD AUTO: 1677 CELLS/UL (ref 850–3900)
LYMPHOCYTES NFR BLD AUTO: 19.5 %
MAGNESIUM SERPL-MCNC: 1.8 MG/DL (ref 1.5–2.5)
MCH RBC QN AUTO: 30 PG (ref 27–33)
MCHC RBC AUTO-ENTMCNC: 34.1 G/DL (ref 32–36)
MCV RBC AUTO: 88.1 FL (ref 80–100)
MONOCYTES # BLD AUTO: 774 CELLS/UL (ref 200–950)
MONOCYTES NFR BLD AUTO: 9 %
NEUTROPHILS # BLD AUTO: 5495 CELLS/UL (ref 1500–7800)
NEUTROPHILS NFR BLD AUTO: 63.9 %
NITRITE UR QL STRIP: NEGATIVE
PH UR STRIP: 7 [PH] (ref 5–8)
PLATELET # BLD AUTO: 350 THOUSAND/UL (ref 140–400)
PMV BLD REES-ECKER: 9.3 FL (ref 7.5–12.5)
POTASSIUM SERPL-SCNC: 3.9 MMOL/L (ref 3.5–5.3)
PROT SERPL-MCNC: 6.3 G/DL (ref 6.1–8.1)
PROT UR QL STRIP: ABNORMAL
PROT UR-MCNC: 343 MG/DL (ref 5–24)
PROT/CREAT UR: 3.12 MG/MG CREAT (ref 0.02–0.18)
PROT/CREAT UR: 3118 MG/G CREAT (ref 24–184)
RBC # BLD AUTO: 4.53 MILLION/UL (ref 3.8–5.1)
RBC #/AREA URNS HPF: ABNORMAL /HPF
SODIUM SERPL-SCNC: 140 MMOL/L (ref 135–146)
SP GR UR STRIP: 1.02 (ref 1–1.03)
SQUAMOUS #/AREA URNS HPF: ABNORMAL /HPF
WBC # BLD AUTO: 8.6 THOUSAND/UL (ref 3.8–10.8)
WBC #/AREA URNS HPF: ABNORMAL /HPF

## 2023-01-04 ENCOUNTER — DOCUMENTATION (OUTPATIENT)
Dept: NEPHROLOGY | Facility: CLINIC | Age: 48
End: 2023-01-04

## 2023-01-04 ENCOUNTER — TELEPHONE (OUTPATIENT)
Dept: NEPHROLOGY | Facility: CLINIC | Age: 48
End: 2023-01-04

## 2023-01-04 ENCOUNTER — HOSPITAL ENCOUNTER (OUTPATIENT)
Dept: MAMMOGRAPHY | Facility: HOSPITAL | Age: 48
Discharge: HOME | End: 2023-01-04

## 2023-01-04 VITALS — WEIGHT: 119.93 LBS | BODY MASS INDEX: 20.47 KG/M2 | HEIGHT: 64 IN

## 2023-01-04 DIAGNOSIS — Z12.31 ENCOUNTER FOR SCREENING MAMMOGRAM FOR MALIGNANT NEOPLASM OF BREAST: ICD-10-CM

## 2023-01-04 NOTE — TELEPHONE ENCOUNTER
Pt advised that kidney function stable (labs of 12/31/22) however protein worsened  Per Dr Ugo Gunn, increase Tacrolimus to   3 mg  BID       ----- Message from Adelina Martin MD sent at 1/3/2023 11:02 PM EST -----  Please inform patient that most recent labs (12/31/22) show that kidney function is stable  However, urinary protein measurement is worse  For now, I would like for her to increase Tacrolimus to 3 mg twice a day

## 2023-01-06 ENCOUNTER — TELEPHONE (OUTPATIENT)
Dept: OBGYN CLINIC | Facility: CLINIC | Age: 48
End: 2023-01-06

## 2023-01-06 NOTE — TELEPHONE ENCOUNTER
Spoke to patient on the phone  She states her period are usually normal every month  This is the first month that she had an abnormal cycle  Today her bleeding has resolved  Apt scheduled for 1/20  If patient continues to have no bleeding she can cancel that apt and we will see her for her yearly in February  If patient has another episode of irregular bleeding recommend she keep her apt for 1/20  Discussed bleeding precautions  If she has heavy bleeding where she is saturating a pad every hour for 2 hours or large clots the size of a plum she needs to be seen in the ED   Patient agreeable to plan

## 2023-01-06 NOTE — TELEPHONE ENCOUNTER
Pt LVM on nurse line stating she started her menstrual cycle this month 10 days early  States her cycle this month started on Monday with bright red blood and by Wednesday morning pt states she soaked through a tampon/pad to her clothes  Pt also stated all day Wednesday to Thursday morning she had huge blood clots soaking through a tampon/pad within a 2 hours time span  States since yesterday late afternoon she had no bleeding  Pt would like to know if this could possibly be the start of menopause  Last seen 10/2021 by Kaveh Gayle  Pt would like a call back at [phone]

## 2023-01-09 ENCOUNTER — OFFICE VISIT (OUTPATIENT)
Dept: NEPHROLOGY | Facility: CLINIC | Age: 48
End: 2023-01-09

## 2023-01-09 VITALS
DIASTOLIC BLOOD PRESSURE: 80 MMHG | BODY MASS INDEX: 20.83 KG/M2 | HEIGHT: 64 IN | SYSTOLIC BLOOD PRESSURE: 120 MMHG | WEIGHT: 122 LBS

## 2023-01-09 DIAGNOSIS — N05.0 MINIMAL CHANGE DISEASE: Primary | ICD-10-CM

## 2023-01-09 RX ORDER — SODIUM CHLORIDE 9 MG/ML
20 INJECTION, SOLUTION INTRAVENOUS ONCE
OUTPATIENT
Start: 2023-01-17

## 2023-01-09 RX ORDER — ACETAMINOPHEN 325 MG/1
650 TABLET ORAL ONCE
OUTPATIENT
Start: 2023-01-17

## 2023-01-09 RX ORDER — DIPHENHYDRAMINE HCL 25 MG
25 TABLET ORAL ONCE
OUTPATIENT
Start: 2023-01-17

## 2023-01-09 NOTE — PROGRESS NOTES
GLOMERULAR DISEASE OUTPATIENT PROGRESS NOTE   Don Section 41 y o  female MRN: [id number]  DATE: 1/9/2023    Reason for visit: No chief complaint on file  ASSESSMENT and PLAN:  40 yo with PMH of minimal-change disease, diagnosed in 2019, started on steroids with good response unfortunately frequent relapse now on tacrolimus follow-up with Dr Enrique Martinez  Patient is here for a second opinion due to frequent relapse    PLAN:    #SHANTEL frequent relapse   • Time of diagnosis:Sep 2019  • Biopsy date/brief summary:  o LM: normal glom  o EM: 95% podocyte effacement with some mesangial deposits? • Genetic Testing:not at this time   • Baseline creatinine: 0 5 mg/dL  • Current creatinine: At baseline  • UPCR: 1 4> 3 1 g/g  • Albumin 3 7 mg/dL, no indication of anticoagulation at this time  • Avoid nonsteroidal anti-inflammatory drugs such as Naprosyn, ibuprofen, Aleve, Advil, Celebrex, Meloxicam (Mobic) etc   You can use Tylenol as needed if you do not have any liver condition to be concerned about      #Immunosuppressive Medications  • First IS treatment  · Prednisone  • Past cycles  o Prednisone x 2 with good response but with relapse  • Current IS meds   o Tacrolimus recently increased to 3 mg twice daily 1 week ago due to increasing UPCR  Plan:  · I agree with Dr Enrique Martinez to add rituximab to achieve complete remission    Rituximab has shown to be effective in patients who have frequent relapses, are steroid dependent  · Commend rituximab 1 g x 2, 2 weeks apart  · Patient agree, rituximab ordered  · We will check tacrolimus levels  · If worsening proteinuria despite of higher levels of tacrolimus, would recommend to start prednisone until rituximab starts working    #Prophylaxis  • Gastric Protection: None  • On vitamin D   • PCP prophy not necessary at this time  • Hepatitis B panel  o Surface antigen and core antibody negative  o Surface antibody reactive  o Patient does not recall if she ever had hepatitis B vaccine but 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 Immunofluorescence negative    SUBJECTIVE / HPI:  Patient is here for second opinion for minimal-change disease with frequent relapse  Patient follows with Dr Arlene Mcdowell, was diagnosed with minimal-change disease in 2019 since then she had relapsed 3 times  Initial treatment was prednisone and she had good response  Second relapse received prednisone in relapse while on low-dose, started on tacrolimus  Was on remission but relapsed once tacrolimus was tapered down    REVIEW OF SYSTEMS:  More than 10 point review of systems were obtained and discussed in length with the patient  Complete review of systems were negative / unremarkable except mentioned above  Review of Systems - Ophthalmic ROS: negative  ENT ROS: negative  Hematological and Lymphatic ROS: negative  Endocrine ROS: negative  Respiratory ROS: no cough, shortness of breath, or wheezing  Cardiovascular ROS: no chest pain or dyspnea on exertion  Gastrointestinal ROS: no abdominal pain, change in bowel habits, or black or bloody stools  Genito-Urinary ROS: no dysuria, trouble voiding, or hematuria  Musculoskeletal ROS: negative  Neurological ROS: no TIA or stroke symptoms  Dermatological ROS: negative     PHYSICAL EXAM:  There were no vitals filed for this visit  There is no height or weight on file to calculate BMI      Physical Exam   General:   no acute distress at this time  Skin:  No acute rash  Eyes:  No scleral icterus and noninjected  ENT:  mucous membranes moist  Neck: no carotid bruits  Chest:  Clear to auscultation percussion, good respiratory effort, no use of accessory respiratory muscles  CVS:  Regular rate and rhythm without rub  Abdomen:  soft and nontender   Extremities: no significant lower extremity edema  Neuro:  No gross focality  Psych:  Alert , cooperative       PAST MEDICAL HISTORY:  Past Medical History:   Diagnosis Date   • Fibroid    • Hyperlipidemia    • Nephrotic syndrome        PAST SURGICAL HISTORY:  Past Surgical History:   Procedure Laterality Date   • IR BIOPSY KIDNEY MASS  9/25/2019   • TUBAL LIGATION     • WISDOM TOOTH EXTRACTION         SOCIAL HISTORY:  Social History     Substance and Sexual Activity   Alcohol Use Not Currently     Social History     Substance and Sexual Activity   Drug Use Never     Social History     Tobacco Use   Smoking Status Every Day   • Packs/day: 0 30   • Types: Cigarettes   Smokeless Tobacco Never   Tobacco Comments    3 cigarettes a day       FAMILY HISTORY:  Family History   Problem Relation Age of Onset   • No Known Problems Mother    • Heart failure Father    • No Known Problems Sister    • No Known Problems Daughter    • Diabetes Paternal Grandmother    • No Known Problems Brother    • No Known Problems Brother    • No Known Problems Son    • No Known Problems Son    • No Known Problems Maternal Aunt    • No Known Problems Maternal Aunt    • No Known Problems Paternal Aunt        MEDICATIONS:    Current Outpatient Medications:   •  Ascorbic Acid (vitamin C) 1000 MG tablet, Take 1,000 mg by mouth daily, Disp: , Rfl:   •  fexofenadine (ALLEGRA) 60 MG tablet, Take 60 mg by mouth daily, Disp: , Rfl:   •  Multiple Vitamin (MULTIVITAMIN ADULT PO), Take 1 tablet by mouth in the morning, Disp: , Rfl:   •  Nerve Stimulator (TENS Therapy Pain Relief) EDGAR, Use 1 application 2 (two) times a day as needed (For left lumbar radiculopathy), Disp: 1 each, Rfl: 0  •  tacrolimus (PROGRAF) 1 mg capsule, Take 3 capsules (3 mg total) by mouth every 12 (twelve) hours, Disp: 180 capsule, Rfl: 2  •  VITAMIN D, CHOLECALCIFEROL, PO, Take by mouth, Disp: , Rfl:     Lab Results:   Results for orders placed or performed in visit on 12/31/22   Hepatitis Panel, General   Result Value Ref Range    Hep A Ab, Total NON-REACTIVE NON-REACTIVE    Hepatitis B Surface Antibody Ql REACTIVE (A) NON-REACTIVE    HBsAg Screen NON-REACTIVE NON-REACTIVE    Hep B Core Total Ab NON-REACTIVE NON-REACTIVE    HEP C AB NON-REACTIVE NON-REACTIVE    Signal to Cut-Off 0 02 <1 00   Magnesium   Result Value Ref Range    Magnesium, Serum 1 8 1 5 - 2 5 mg/dL   Comprehensive metabolic panel   Result Value Ref Range    Glucose, Random 78 65 - 99 mg/dL    BUN 9 7 - 25 mg/dL    Creatinine 0 50 0 50 - 0 99 mg/dL    eGFR 118 > OR = 60 mL/min/1 73m2    SL AMB BUN/CREATININE RATIO NOT APPLICABLE 6 - 22 (calc)    Sodium 140 135 - 146 mmol/L    Potassium 3 9 3 5 - 5 3 mmol/L    Chloride 105 98 - 110 mmol/L    CO2 26 20 - 32 mmol/L    Calcium 8 8 8 6 - 10 2 mg/dL    Protein, Total 6 3 6 1 - 8 1 g/dL    Albumin 3 7 3 6 - 5 1 g/dL    Globulin 2 6 1 9 - 3 7 g/dL (calc)    Albumin/Globulin Ratio 1 4 1 0 - 2 5 (calc)    TOTAL BILIRUBIN 1 1 0 2 - 1 2 mg/dL    Alkaline Phosphatase 44 31 - 125 U/L    AST 26 10 - 35 U/L    ALT 22 6 - 29 U/L   Urinalysis with microscopic   Result Value Ref Range    Color UA YELLOW YELLOW    Urine Appearance CLEAR CLEAR    Specific Gravity 1 021 1 001 - 1 035    Ph 7 0 5 0 - 8 0    Glucose, Urine NEGATIVE NEGATIVE    Bilirubin, Urine NEGATIVE NEGATIVE    Ketone, Urine NEGATIVE NEGATIVE    Blood, Urine NEGATIVE NEGATIVE    Protein, Urine 3+ (A) NEGATIVE    SL AMB NITRITES URINE, QUAL   NEGATIVE NEGATIVE    Leukocyte Esterase NEGATIVE NEGATIVE    SL AMB WBC, URINE NONE SEEN < OR = 5 /HPF    RBC, Urine NONE SEEN < OR = 2 /HPF    Squamous Epithelial Cells 0-5 < OR = 5 /HPF    Bacteria, UA NONE SEEN NONE SEEN /HPF    Hyaline Casts NONE SEEN NONE SEEN /LPF    Comment(s)     CBC and differential   Result Value Ref Range    White Blood Cell Count 8 6 3 8 - 10 8 Thousand/uL    Red Blood Cell Count 4 53 3 80 - 5 10 Million/uL    Hemoglobin 13 6 11 7 - 15 5 g/dL    HCT 39 9 35 0 - 45 0 %    MCV 88 1 80 0 - 100 0 fL    MCH 30 0 27 0 - 33 0 pg    MCHC 34 1 32 0 - 36 0 g/dL    RDW 11 8 11 0 - 15 0 %    Platelet Count 210 001 - 400 Thousand/uL    SL AMB MPV 9 3 7 5 - 12 5 fL    Neutrophils (Absolute) 5,495 1,500 - 7,800 cells/uL    Lymphocytes (Absolute) 1,677 850 - 3,900 cells/uL    Monocytes (Absolute) 774 200 - 950 cells/uL    Eosinophils (Absolute) 576 (H) 15 - 500 cells/uL    Basophils ABS 77 0 - 200 cells/uL    Neutrophils 63 9 %    Lymphocytes 19 5 %    Monocytes 9 0 %    Eosinophils 6 7 %    Basophils PCT 0 9 %    Always Message     Protein, Total w/Creat, Random Urine   Result Value Ref Range    Creatinine, Urine 110 20 - 275 mg/dL    Protein/Creat Ratio 3,118 (H) 24 - 184 mg/g creat    Protein/Creat Ratio 3 118 (H) 0 024 - 0 184 mg/mg creat    Total Protein, Urine 343 (H) 5 - 24 mg/dL

## 2023-01-09 NOTE — PATIENT INSTRUCTIONS
Thank you for coming to your visit today  As we discussed you kidney function is normal  You have a glomerular disease called Minimal Change Disease with frequent relapse  I agree with Dr Landon Verduzco with adding Rituximab to your treatment to achieve more permanent remission  Please follow the recommendations below       Recommend low sodium (salt) food    Avoid nonsteroidal anti-inflammatory drugs such as Naprosyn, ibuprofen, Aleve, Advil, Celebrex, Meloxicam (Mobic) etc   You can use Tylenol as needed if you do not have any liver condition to be concerned about    Try to avoid medications such as pantoprazole or  Protonix/Nexium or Esomeprazole)/Prilosec or omeprazole/Prevacid or lansoprazole/AcipHex or Rabeprazole  If you are able to, use Pepcid as this is safer for your kidneys      Try to exercise at least 30 minutes 3 days a week to begin with with an ultimate goal of 5 days a week for at least 30 minutes    Next Visit in March with Dr Landon Verduzco   If you need to see us earlier we can change the appointment for you      Joan Mead MD  Nephrology Attending

## 2023-01-18 ENCOUNTER — RA CDI HCC (OUTPATIENT)
Dept: OTHER | Facility: HOSPITAL | Age: 48
End: 2023-01-18

## 2023-01-18 NOTE — PROGRESS NOTES
[facility]  coding opportunities       Chart reviewed, no opportunity found: CHART REVIEWED, NO OPPORTUNITY FOUND        Patients Insurance        Commercial Insurance: [address] [place] [address]

## 2023-01-23 ENCOUNTER — TELEPHONE (OUTPATIENT)
Dept: NEPHROLOGY | Facility: CLINIC | Age: 48
End: 2023-01-23

## 2023-01-23 NOTE — TELEPHONE ENCOUNTER
Pt called to advise that she checked with her workplace  She did have hepatitis B vaccine back in January 2006  Wanted Dr Jose Juan Sanchez to know and is asking if the hepatitis immune panel still needs to be done?

## 2023-01-25 ENCOUNTER — OFFICE VISIT (OUTPATIENT)
Dept: INTERNAL MEDICINE CLINIC | Facility: CLINIC | Age: 48
End: 2023-01-25

## 2023-01-25 VITALS
WEIGHT: 123.4 LBS | SYSTOLIC BLOOD PRESSURE: 118 MMHG | OXYGEN SATURATION: 98 % | DIASTOLIC BLOOD PRESSURE: 78 MMHG | HEIGHT: 64 IN | HEART RATE: 111 BPM | BODY MASS INDEX: 21.07 KG/M2

## 2023-01-25 DIAGNOSIS — Z00.00 ANNUAL PHYSICAL EXAM: Primary | ICD-10-CM

## 2023-01-25 DIAGNOSIS — Z13.220 SCREENING, LIPID: ICD-10-CM

## 2023-01-25 DIAGNOSIS — N64.4 BREAST PAIN, RIGHT: ICD-10-CM

## 2023-01-25 DIAGNOSIS — N92.6 IRREGULAR MENSES: ICD-10-CM

## 2023-01-25 DIAGNOSIS — Z11.4 ENCOUNTER FOR SCREENING FOR HIV: ICD-10-CM

## 2023-01-25 DIAGNOSIS — Z12.11 ENCOUNTER FOR SCREENING COLONOSCOPY: ICD-10-CM

## 2023-01-25 DIAGNOSIS — E04.1 THYROID NODULE: ICD-10-CM

## 2023-01-25 DIAGNOSIS — Z23 ENCOUNTER FOR IMMUNIZATION: ICD-10-CM

## 2023-01-25 NOTE — PROGRESS NOTES
[facility]    NAME: Gabrielle Kong  AGE: 57 y o  SEX: female  : 1975     DATE: 2023     Assessment and Plan:     Problem List Items Addressed This Visit    None  Visit Diagnoses     Annual physical exam    -  Primary    Encounter for immunization        Relevant Orders    TDAP VACCINE GREATER THAN OR EQUAL TO 8YO IM (Completed)    Pneumococcal Conjugate Vaccine 20-valent (Pcv20) (Completed)    Encounter for screening colonoscopy        Relevant Orders    Ambulatory referral for colonoscopy    Screening, lipid        Relevant Orders    Lipid Panel with Direct LDL reflex    Encounter for screening for HIV        Relevant Orders    : HIV 1/2 AB/AG w Reflex [facility] for 2 yr old and above    Irregular menses        Relevant Orders    TSH, 3rd generation with Free T4 reflex    Thyroid nodule        Relevant Orders    US thyroid    Breast pain, right        Relevant Orders    US breast right limited (diagnostic)          Immunizations and preventive care screenings were discussed with patient today  Appropriate education was printed on patient's after visit summary  Counseling:  Exercise: the importance of regular exercise/physical activity was discussed  Recommend exercise 3-5 times per week for at least 30 minutes  Diag mammo for right breast pain       Return in about 1 year (around 2024)  Chief Complaint:     Chief Complaint   Patient presents with   • Physical Exam      History of Present Illness:     Adult Annual Physical   Patient here for a comprehensive physical exam  The patient reports problems - tender area on the right upper chest wall for a few months  Works in laundry dept  at [employer]  Diet/Nutrition: well balanced diet and low salt  Exercise: 5-7 times a week on average        Depression Screening  PHQ-2/9 Depression Screening    Little interest or pleasure in doing things: 0 - not at all  Feeling down, depressed, or hopeless: 0 - not at all  PHQ-2 Score: 0  PHQ-2 Interpretation: Negative depression screen       General Health  Sleep: sleeps well  Hearing: normal - bilateral   Vision: no vision problems  Dental: regular dental visits  /GYN Health  Patient is: perimenopausal, irregular and heavy recently  Last menstrual period: 1/18/2023     Review of Systems:     Review of Systems   Constitutional: Negative for chills, fatigue, fever and unexpected weight change  HENT: Negative for ear pain, hearing loss, sinus pressure, sinus pain and sore throat  Respiratory: Negative for cough, shortness of breath and wheezing  Cardiovascular: Negative for chest pain, palpitations and leg swelling  Gastrointestinal: Negative for abdominal pain, constipation, diarrhea, nausea and vomiting  Musculoskeletal: Positive for back pain (low back radiating to the left better with TENS)  Negative for arthralgias and myalgias  Neurological: Negative for dizziness and headaches        Past Medical History:     Past Medical History:   Diagnosis Date   • Fibroid    • Hyperlipidemia    • Nephrotic syndrome       Past Surgical History:     Past Surgical History:   Procedure Laterality Date   • IR BIOPSY KIDNEY MASS  9/25/2019   • TUBAL LIGATION     • WISDOM TOOTH EXTRACTION        Social History:     Social History     Socioeconomic History   • Marital status: Single     Spouse name: None   • Number of children: None   • Years of education: None   • Highest education level: None   Occupational History   • None   Tobacco Use   • Smoking status: Every Day     Packs/day: 0 25     Years: 25 00     Pack years: 6 25     Types: Cigarettes   • Smokeless tobacco: Never   • Tobacco comments:     3 cigarettes a day   Vaping Use   • Vaping Use: Never used   Substance and Sexual Activity   • Alcohol use: Not Currently   • Drug use: Never   • Sexual activity: Yes     Partners: Male Birth control/protection: Surgical   Other Topics Concern   • None   Social History Narrative   • None     Social Determinants of Health     Financial Resource Strain: Not on file   Food Insecurity: Not on file   Transportation Needs: Not on file   Physical Activity: Not on file   Stress: Not on file   Social Connections: Not on file   Intimate Partner Violence: Not on file   Housing Stability: Not on file      Family History:     Family History   Problem Relation Age of Onset   • No Known Problems Mother    • Heart failure Father    • No Known Problems Sister    • No Known Problems Daughter    • Diabetes Paternal Grandmother    • No Known Problems Brother    • No Known Problems Brother    • No Known Problems Son    • No Known Problems Son    • No Known Problems Maternal Aunt    • No Known Problems Maternal Aunt    • No Known Problems Paternal Aunt       Current Medications:     Current Outpatient Medications   Medication Sig Dispense Refill   • Ascorbic Acid (vitamin C) 1000 MG tablet Take 1,000 mg by mouth daily     • Multiple Vitamin (MULTIVITAMIN ADULT PO) Take 1 tablet by mouth in the morning     • Nerve Stimulator (TENS Therapy Pain Relief) EDGAR Use 1 application 2 (two) times a day as needed (For left lumbar radiculopathy) 1 each 0   • tacrolimus (PROGRAF) 1 mg capsule Take 3 capsules (3 mg total) by mouth every 12 (twelve) hours 180 capsule 2   • VITAMIN D, CHOLECALCIFEROL, PO Take by mouth       No current facility-administered medications for this visit  Allergies:     No Known Allergies   Physical Exam:     /78   Pulse (!) 111   Ht 5' 4" (1 626 m)   Wt 56 kg (123 lb 6 4 oz)   LMP 01/02/2023 (Exact Date)   SpO2 98%   BMI 21 18 kg/m²     Physical Exam  Constitutional:       General: She is not in acute distress  Appearance: She is well-developed  She is not ill-appearing, toxic-appearing or diaphoretic  HENT:      Head: Normocephalic and atraumatic        Right Ear: External ear normal  There is impacted cerumen  Left Ear: External ear normal  There is impacted cerumen  Eyes:      Conjunctiva/sclera: Conjunctivae normal    Neck:      Thyroid: Thyromegaly present  Cardiovascular:      Rate and Rhythm: Normal rate and regular rhythm  Heart sounds: Normal heart sounds  No murmur heard  Pulmonary:      Effort: Pulmonary effort is normal  No respiratory distress  Breath sounds: Normal breath sounds  No stridor  No wheezing or rales  Abdominal:      General: There is no distension  Palpations: Abdomen is soft  There is no mass  Tenderness: There is no abdominal tenderness  There is no guarding or rebound  Musculoskeletal:      Cervical back: Neck supple  Right lower leg: No edema  Left lower leg: No edema  Neurological:      Mental Status: She is alert and oriented to person, place, and time  Psychiatric:         Mood and Affect: Mood normal          Behavior: Behavior normal          Thought Content:  Thought content normal          Judgment: Judgment normal       No palpable mass in the right upper chest wall just above the upper outer breast where she feels pain    Myesha Garrett MD  [facility]

## 2023-01-25 NOTE — PATIENT INSTRUCTIONS

## 2023-01-29 LAB
CHOLEST SERPL-MCNC: 200 MG/DL
CHOLEST/HDLC SERPL: 2.2 (CALC)
HDLC SERPL-MCNC: 90 MG/DL
HIV 1+2 AB+HIV1 P24 AG SERPL QL IA: NORMAL
LDLC SERPL CALC-MCNC: 93 MG/DL (CALC)
NONHDLC SERPL-MCNC: 110 MG/DL (CALC)
TRIGL SERPL-MCNC: 80 MG/DL
TSH SERPL-ACNC: 1.18 MIU/L

## 2023-01-31 LAB
ALBUMIN SERPL-MCNC: 3.7 G/DL (ref 3.6–5.1)
ALBUMIN/GLOB SERPL: 1.4 (CALC) (ref 1–2.5)
ALP SERPL-CCNC: 44 U/L (ref 31–125)
ALT SERPL-CCNC: 17 U/L (ref 6–29)
AST SERPL-CCNC: 19 U/L (ref 10–35)
BILIRUB SERPL-MCNC: 0.8 MG/DL (ref 0.2–1.2)
BUN SERPL-MCNC: 10 MG/DL (ref 7–25)
BUN/CREAT SERPL: NORMAL (CALC) (ref 6–22)
CALCIUM SERPL-MCNC: 8.7 MG/DL (ref 8.6–10.2)
CHLORIDE SERPL-SCNC: 105 MMOL/L (ref 98–110)
CO2 SERPL-SCNC: 29 MMOL/L (ref 20–32)
CREAT SERPL-MCNC: 0.52 MG/DL (ref 0.5–0.99)
GFR/BSA.PRED SERPLBLD CYS-BASED-ARV: 116 ML/MIN/1.73M2
GLOBULIN SER CALC-MCNC: 2.6 G/DL (CALC) (ref 1.9–3.7)
GLUCOSE SERPL-MCNC: 78 MG/DL (ref 65–99)
POTASSIUM SERPL-SCNC: 4.1 MMOL/L (ref 3.5–5.3)
PROT SERPL-MCNC: 6.3 G/DL (ref 6.1–8.1)
SODIUM SERPL-SCNC: 140 MMOL/L (ref 135–146)
TACROLIMUS BLD-MCNC: 4 MCG/L

## 2023-02-01 ENCOUNTER — TELEPHONE (OUTPATIENT)
Dept: NEPHROLOGY | Facility: CLINIC | Age: 48
End: 2023-02-01

## 2023-02-01 ENCOUNTER — HOSPITAL ENCOUNTER (OUTPATIENT)
Dept: ULTRASOUND IMAGING | Facility: HOSPITAL | Age: 48
Discharge: HOME | End: 2023-02-01

## 2023-02-01 DIAGNOSIS — E04.1 THYROID NODULE: ICD-10-CM

## 2023-02-01 NOTE — TELEPHONE ENCOUNTER
----- Message from Khloe Desouza sent at 1/31/2023 12:35 PM EST -----  Pt was seen by Dr Bonnie Crespo for second opinion  Rituxan was ordered  Dr Bonnie Crespo told pt pre Kelle Wood would be needed as well  Have you started working on this?        ----- Message -----  From: Becca Collado MD  Sent: 1/31/2023  11:06 AM EST  To: Nephrology [facility] Clinical    Please inform patient that lab results from 1/28/23 are all stable  Is she scheduled for Rituxan?

## 2023-02-01 NOTE — TELEPHONE ENCOUNTER
Received a the form from [facility]  I sent the form and last visit summary at fax # [phone]

## 2023-02-01 NOTE — TELEPHONE ENCOUNTER
Called [employer] for pre authorization for Rituximab  Rachel Huggins said we need to fill out a form for this pre authorization and will fax the form to [phone]

## 2023-02-01 NOTE — TELEPHONE ENCOUNTER
Pt advised that labs of 1/28/23 are stable per Dr Cordell Woody (Dr Merritt Briones MA) will be processing auth for Rituxan       ----- Message from Vinh Dyer MD sent at 1/31/2023 11:05 AM EST -----  Please inform patient that lab results from 1/28/23 are all stable  Is she scheduled for Rituxan?

## 2023-02-28 ENCOUNTER — ANNUAL EXAM (OUTPATIENT)
Dept: OBGYN CLINIC | Facility: CLINIC | Age: 48
End: 2023-02-28

## 2023-02-28 VITALS
WEIGHT: 126.6 LBS | BODY MASS INDEX: 21.61 KG/M2 | SYSTOLIC BLOOD PRESSURE: 120 MMHG | DIASTOLIC BLOOD PRESSURE: 84 MMHG | HEIGHT: 64 IN

## 2023-02-28 DIAGNOSIS — Z12.31 ENCOUNTER FOR SCREENING MAMMOGRAM FOR MALIGNANT NEOPLASM OF BREAST: ICD-10-CM

## 2023-02-28 DIAGNOSIS — N92.0 MENORRHAGIA WITH REGULAR CYCLE: ICD-10-CM

## 2023-02-28 DIAGNOSIS — Z01.419 WOMEN'S ANNUAL ROUTINE GYNECOLOGICAL EXAMINATION: Primary | ICD-10-CM

## 2023-02-28 RX ORDER — TACROLIMUS 1 MG/1
1 CAPSULE ORAL
COMMUNITY

## 2023-02-28 NOTE — PROGRESS NOTES
ASSESSMENT & PLAN:   Diagnoses and all orders for this visit:    Women's annual routine gynecological examination    Encounter for screening mammogram for malignant neoplasm of breast  -     Mammo screening bilateral w 3d & cad; Future    Menorrhagia with regular cycle  Comments:  dc poss perimenopausal changes  Discussed possible US/EMB/IUD in future if pt desires  Other orders  -     tacrolimus (PROGRAF) 1 mg capsule; Take 1 mg by mouth 6 (six) times a day 3 in the morning and 3 at night  The following were reviewed in today's visit: ASCCP guidelines, Gardisil vaccination, STD testing breast self exam, mammography screening ordered, STD testing, exercise, healthy diet and colonoscopy discussed and ordered  Patient to return to office in yearly for annual exam      All questions have been answered to her satisfaction  CC:  Annual Gynecologic Examination  Chief Complaint   Patient presents with   • Gynecologic Exam     Pt is here for an annual exam  Pap not indicated, last pap pap 10/12/21 wnl, hpv-  Pt is well, states there are no concerns at this time  HPI: Allegra Beltran is a 57 y o  [id number] who presents for annual gynecologic examination  She has the following concerns:  Patient reports an abnormal period that was 2 weeks early and very heavy with clots  This was abnormal for her  It has since regulated         Health Maintenance:    Exercise: frequently  Breast exams/breast awareness: yes  Diet: well balanced diet  Last mammogram: 2023  Colorectal cancer screening: referred for one by PCP      Past Medical History:   Diagnosis Date   • Fibroid    • Hyperlipidemia    • Nephrotic syndrome        Past Surgical History:   Procedure Laterality Date   • IR BIOPSY KIDNEY MASS  9/25/2019   • TUBAL LIGATION     • WISDOM TOOTH EXTRACTION         Past OB/Gyn History:  Period Cycle (Days): 24  Period Duration (Days): 6-8  Period Pattern: Regular  Menstrual Flow: Moderate  Menstrual Control: Thin pad, Panty liner, Maxi pad, TamponPatient's last menstrual period was 02/13/2023 (exact date)  Menopausal status: premenopausal  Menopausal symptoms: None    Last Pap: 2021 : no abnormalities  History of abnormal Pap smear: no    Patient is currently sexually active     STD testing: no  Current contraception: none      Family History  Family History   Problem Relation Age of Onset   • No Known Problems Mother    • Heart failure Father    • No Known Problems Sister    • No Known Problems Daughter    • Diabetes Paternal Grandmother    • No Known Problems Brother    • No Known Problems Brother    • No Known Problems Son    • No Known Problems Son    • No Known Problems Maternal Aunt    • No Known Problems Maternal Aunt    • No Known Problems Paternal Aunt        Family history of uterine or ovarian cancer: no  Family history of breast cancer: no  Family history of colon cancer: no    Social History:  Social History     Socioeconomic History   • Marital status: Single     Spouse name: Not on file   • Number of children: Not on file   • Years of education: Not on file   • Highest education level: Not on file   Occupational History   • Not on file   Tobacco Use   • Smoking status: Every Day     Packs/day: 0 25     Years: 25 00     Pack years: 6 25     Types: Cigarettes   • Smokeless tobacco: Never   • Tobacco comments:     3 cigarettes a day   Vaping Use   • Vaping Use: Never used   Substance and Sexual Activity   • Alcohol use: Yes     Comment: occasionally   • Drug use: Never   • Sexual activity: Yes     Partners: Male     Birth control/protection: Surgical   Other Topics Concern   • Not on file   Social History Narrative   • Not on file     Social Determinants of Health     Financial Resource Strain: Not on file   Food Insecurity: Not on file   Transportation Needs: Not on file   Physical Activity: Not on file   Stress: Not on file   Social Connections: Not on file   Intimate Partner Violence: Not on file   Housing Stability: Not on file     Domestic violence screen: negative    Allergies:  No Known Allergies    Medications:    Current Outpatient Medications:   •  Ascorbic Acid (vitamin C) 1000 MG tablet, Take 1,000 mg by mouth daily, Disp: , Rfl:   •  Multiple Vitamin (MULTIVITAMIN ADULT PO), Take 1 tablet by mouth in the morning, Disp: , Rfl:   •  Nerve Stimulator (TENS Therapy Pain Relief) EDGAR, Use 1 application 2 (two) times a day as needed (For left lumbar radiculopathy), Disp: 1 each, Rfl: 0  •  tacrolimus (PROGRAF) 1 mg capsule, Take 1 mg by mouth 6 (six) times a day 3 in the morning and 3 at night , Disp: , Rfl:   •  VITAMIN D, CHOLECALCIFEROL, PO, Take by mouth, Disp: , Rfl:     Review of Systems:  Review of Systems   Constitutional: Negative for chills, fever and unexpected weight change  Respiratory: Negative for shortness of breath  Cardiovascular: Negative for chest pain  Gastrointestinal: Negative for abdominal pain, diarrhea, nausea and vomiting  Skin: Negative for rash  Psychiatric/Behavioral: Negative for dysphoric mood  The patient is not nervous/anxious  Physical Exam:  /84 (BP Location: Left arm, Patient Position: Sitting, Cuff Size: Standard)   Ht 5' 4" (1 626 m)   Wt 57 4 kg (126 lb 9 6 oz)   LMP 02/13/2023 (Exact Date)   BMI 21 73 kg/m²    Physical Exam  Constitutional:       Appearance: Normal appearance  Genitourinary:      Vulva and urethral meatus normal       No lesions in the vagina  Right Labia: No rash or lesions  Left Labia: No lesions or rash  No vaginal discharge, erythema or bleeding  Right Adnexa: not tender and no mass present  Left Adnexa: not tender and no mass present  No cervical discharge or lesion  Uterus is not tender  Breasts:     Breasts are symmetrical       Right: No mass or skin change  Left: No mass or skin change  HENT:      Head: Normocephalic and atraumatic     Cardiovascular:      Rate and Rhythm: Normal rate and regular rhythm  Heart sounds: Normal heart sounds  No murmur heard  No friction rub  No gallop  Pulmonary:      Effort: Pulmonary effort is normal       Breath sounds: Normal breath sounds  No wheezing, rhonchi or rales  Abdominal:      General: Abdomen is flat  There is no distension  Palpations: Abdomen is soft  Tenderness: There is no abdominal tenderness  Musculoskeletal:      Cervical back: Neck supple  Lymphadenopathy:      Upper Body:      Right upper body: No axillary adenopathy  Left upper body: No axillary adenopathy  Neurological:      General: No focal deficit present  Mental Status: She is alert  Skin:     General: Skin is warm and dry  Psychiatric:         Mood and Affect: Mood normal          Behavior: Behavior normal    Vitals reviewed

## 2023-03-01 ENCOUNTER — TELEPHONE (OUTPATIENT)
Dept: NEPHROLOGY | Facility: CLINIC | Age: 48
End: 2023-03-01

## 2023-03-01 NOTE — TELEPHONE ENCOUNTER
Called patient to get her pharmacy card information car # and phone #  Patient will call the office to let us know the information of her pharmacy card

## 2023-03-02 NOTE — TELEPHONE ENCOUNTER
Patient called back as she had received a message asking for the phone number for her Prescription card  Patient scanned in prescription card to [facility]  Phone number is listed on card as [phone] for Prior Auths

## 2023-03-16 ENCOUNTER — HOSPITAL ENCOUNTER (OUTPATIENT)
Dept: ULTRASOUND IMAGING | Facility: CLINIC | Age: 48
Discharge: HOME | End: 2023-03-16

## 2023-03-16 ENCOUNTER — HOSPITAL ENCOUNTER (OUTPATIENT)
Dept: MAMMOGRAPHY | Facility: CLINIC | Age: 48
Discharge: HOME | End: 2023-03-16

## 2023-03-16 VITALS — WEIGHT: 126.54 LBS | BODY MASS INDEX: 21.6 KG/M2 | HEIGHT: 64 IN

## 2023-03-16 DIAGNOSIS — N64.4 BREAST PAIN, RIGHT: ICD-10-CM

## 2023-03-17 DIAGNOSIS — N04.0 NEPHROTIC SYNDROME WITH LESION OF MINIMAL CHANGE GLOMERULONEPHRITIS: Primary | ICD-10-CM

## 2023-03-17 DIAGNOSIS — R80.9 PROTEINURIA, UNSPECIFIED TYPE: ICD-10-CM

## 2023-03-20 DIAGNOSIS — N05.0 MINIMAL CHANGE DISEASE: Primary | ICD-10-CM

## 2023-03-22 RX ORDER — TACROLIMUS 1 MG/1
3 CAPSULE ORAL EVERY 12 HOURS
Qty: 540 CAPSULE | Refills: 3 | Status: SHIPPED | OUTPATIENT
Start: 2023-03-22

## 2023-03-29 ENCOUNTER — VBI (OUTPATIENT)
Dept: ADMINISTRATIVE | Facility: OTHER | Age: 48
End: 2023-03-29

## 2023-04-03 ENCOUNTER — TELEPHONE (OUTPATIENT)
Dept: NEPHROLOGY | Facility: CLINIC | Age: 48
End: 2023-04-03

## 2023-04-03 LAB
ALBUMIN SERPL-MCNC: 4.2 G/DL (ref 3.6–5.1)
ALBUMIN/GLOB SERPL: 1.6 (CALC) (ref 1–2.5)
ALP SERPL-CCNC: 33 U/L (ref 31–125)
ALT SERPL-CCNC: 16 U/L (ref 6–29)
APPEARANCE UR: CLEAR
AST SERPL-CCNC: 19 U/L (ref 10–35)
BASOPHILS # BLD AUTO: 89 CELLS/UL (ref 0–200)
BASOPHILS NFR BLD AUTO: 1.2 %
BILIRUB SERPL-MCNC: 0.8 MG/DL (ref 0.2–1.2)
BILIRUB UR QL STRIP: NEGATIVE
BUN SERPL-MCNC: 11 MG/DL (ref 7–25)
BUN/CREAT SERPL: NORMAL (CALC) (ref 6–22)
CALCIUM SERPL-MCNC: 9.1 MG/DL (ref 8.6–10.2)
CHLORIDE SERPL-SCNC: 105 MMOL/L (ref 98–110)
CO2 SERPL-SCNC: 27 MMOL/L (ref 20–32)
COLOR UR: YELLOW
CREAT SERPL-MCNC: 0.55 MG/DL (ref 0.5–0.99)
CREAT UR-MCNC: 19 MG/DL (ref 20–275)
EOSINOPHIL # BLD AUTO: 178 CELLS/UL (ref 15–500)
EOSINOPHIL NFR BLD AUTO: 2.4 %
ERYTHROCYTE [DISTWIDTH] IN BLOOD BY AUTOMATED COUNT: 11.4 % (ref 11–15)
GFR/BSA.PRED SERPLBLD CYS-BASED-ARV: 114 ML/MIN/1.73M2
GLOBULIN SER CALC-MCNC: 2.7 G/DL (CALC) (ref 1.9–3.7)
GLUCOSE SERPL-MCNC: 83 MG/DL (ref 65–99)
GLUCOSE UR QL STRIP: NEGATIVE
HCT VFR BLD AUTO: 38.9 % (ref 35–45)
HGB BLD-MCNC: 12.7 G/DL (ref 11.7–15.5)
HGB UR QL STRIP: NEGATIVE
KETONES UR QL STRIP: NEGATIVE
LEUKOCYTE ESTERASE UR QL STRIP: NEGATIVE
LYMPHOCYTES # BLD AUTO: 1991 CELLS/UL (ref 850–3900)
LYMPHOCYTES NFR BLD AUTO: 26.9 %
MAGNESIUM SERPL-MCNC: 1.7 MG/DL (ref 1.5–2.5)
MCH RBC QN AUTO: 30.2 PG (ref 27–33)
MCHC RBC AUTO-ENTMCNC: 32.6 G/DL (ref 32–36)
MCV RBC AUTO: 92.4 FL (ref 80–100)
MONOCYTES # BLD AUTO: 629 CELLS/UL (ref 200–950)
MONOCYTES NFR BLD AUTO: 8.5 %
NEUTROPHILS # BLD AUTO: 4514 CELLS/UL (ref 1500–7800)
NEUTROPHILS NFR BLD AUTO: 61 %
NITRITE UR QL STRIP: NEGATIVE
PH UR STRIP: 7 [PH] (ref 5–8)
PLATELET # BLD AUTO: 354 THOUSAND/UL (ref 140–400)
PMV BLD REES-ECKER: 9.2 FL (ref 7.5–12.5)
POTASSIUM SERPL-SCNC: 4.5 MMOL/L (ref 3.5–5.3)
PROT SERPL-MCNC: 6.9 G/DL (ref 6.1–8.1)
PROT UR QL STRIP: NEGATIVE
PROT UR-MCNC: <4 MG/DL (ref 5–24)
PROT/CREAT UR: ABNORMAL MG/G CREAT (ref 24–184)
PROT/CREAT UR: ABNORMAL MG/MG CREAT (ref 0.02–0.18)
RBC # BLD AUTO: 4.21 MILLION/UL (ref 3.8–5.1)
SODIUM SERPL-SCNC: 138 MMOL/L (ref 135–146)
SP GR UR STRIP: 1.01 (ref 1–1.03)
TACROLIMUS BLD-MCNC: 4.9 MCG/L
WBC # BLD AUTO: 7.4 THOUSAND/UL (ref 3.8–10.8)

## 2023-04-03 NOTE — TELEPHONE ENCOUNTER
Received a call from Mingly on urgent labs regarding patients Tacrolimus level from 4/1/23 which is 4 9  Dr Cristofer Burks is off, I will tiger text Prasanna Daley

## 2023-04-05 ENCOUNTER — OFFICE VISIT (OUTPATIENT)
Dept: NEPHROLOGY | Facility: CLINIC | Age: 48
End: 2023-04-05

## 2023-04-05 VITALS
BODY MASS INDEX: 20.66 KG/M2 | SYSTOLIC BLOOD PRESSURE: 121 MMHG | DIASTOLIC BLOOD PRESSURE: 78 MMHG | HEART RATE: 94 BPM | WEIGHT: 121 LBS | HEIGHT: 64 IN

## 2023-04-05 DIAGNOSIS — R80.9 PROTEINURIA, UNSPECIFIED TYPE: ICD-10-CM

## 2023-04-05 DIAGNOSIS — E88.09 HYPOALBUMINEMIA: ICD-10-CM

## 2023-04-05 DIAGNOSIS — N04.0 NEPHROTIC SYNDROME WITH LESION OF MINIMAL CHANGE GLOMERULONEPHRITIS: ICD-10-CM

## 2023-04-05 DIAGNOSIS — N05.0 MINIMAL CHANGE DISEASE: Primary | ICD-10-CM

## 2023-04-05 PROBLEM — R60.0 BILATERAL EDEMA OF LOWER EXTREMITY: Status: RESOLVED | Noted: 2019-09-15 | Resolved: 2023-04-05

## 2023-04-05 PROBLEM — R79.89 ELEVATED BRAIN NATRIURETIC PEPTIDE (BNP) LEVEL: Status: RESOLVED | Noted: 2019-09-22 | Resolved: 2023-04-05

## 2023-04-05 NOTE — PROGRESS NOTES
NEPHROLOGY OFFICE PROGRESS NOTE   Marvine Gaucher 57 y o  female MRN: [id number]  DATE: 04/05/23  Reason for visit: Continued evaluation and management of SHANTEL    ASSESSMENT & PLAN:  1  Nephrotic syndrome due to minimal change disease:  • Summary: Diagnosed in Sep 2019 and responded well to Prednisone - Was treated with Prednisone from 10/1/19 to 4/4/20  Had relapse of SHANTEL in May 2020 and restarted Prednisone on 5/18/20  Unfortunately, she had a relapse in Dec 2020 while Prednisone was being titrated down  Prednisone was increased and Tacrolimus was added on 2/3/21  • Since then, Prednisone has been weaned off - has been off since June 2021  • Remission was maintained with Tacrolimus 3 mg BID  • Unfortunately, proteinuria worsens when we try to wean Tacrolimus  • As such, we have recommended Rituximab 1000 mg IV x 2 doses but this has been delayed due to insurance issues  I will have my staff follow up on this and hopefully, we can start her on Rituximab soon  • Once she starts Rituximab, I intend to wean Tacrolimus  2  Prophylaxis:  • Continue Vitamin D3 1000 units once a day       3  Hx of hyperlipidemia:  • Resolved  • Off Atorvastatin since December 2019         Patient Instructions   The minimal-change disease appears to be stable  Your protein in the urine is currently controlled with tacrolimus  We will arrange for rituximab infusions  Please let us know once you get the second infusion so we can start planning for weaning tacrolimus  Follow-up in 4 months  SUBJECTIVE / INTERVAL HISTORY:  Maria T Canas was last seen by me in the office back in November 2022  During that time, we had recommended the initiation of rituximab  We did send her to Dr Mark Hines for a second opinion who agreed with the initiation of rituximab  Unfortunately, the initiation of rituximab has been delayed due to issues related to insurance  She is currently still on tacrolimus    There have been no recent "hospitalizations or ER visits  She feels well and denies any acute complaints  PMH/PSH: SHANTEL       Previous work up:   September 25, 2019 Renal biopsy:  1  Minimal change disease  2  Arteriosclerosis, mild to moderate      9/23/19: ZAYNAB negative, KL ratio 0 88, UPEP No M spike, SPEP No M spike, Hep B and C NR, C3 127, C4 30, ANCA negative, anti PLA2R Ab negative     9/23/19 Renal US: R 11 8 cm, L 11 4 cm, no hydronephrosis  ALLERGIES: No Known Allergies    REVIEW OF SYSTEMS:  Review of Systems   Constitutional: Negative for appetite change, chills, fatigue and fever  Respiratory: Negative for cough and shortness of breath  Cardiovascular: Negative for chest pain and leg swelling  Gastrointestinal: Negative for abdominal pain, diarrhea, nausea and vomiting  Genitourinary: Negative for dysuria and hematuria  Musculoskeletal: Negative for arthralgias and back pain  Allergic/Immunologic: Positive for immunocompromised state  Neurological: Negative for dizziness and light-headedness  OBJECTIVE:  /78 (BP Location: Left arm, Patient Position: Sitting, Cuff Size: Standard)   Pulse 94   Ht 5' 4\" (1 626 m)   Wt 54 9 kg (121 lb)   LMP 03/16/2023   BMI 20 77 kg/m²   Current Weight: Weight - Scale: 54 9 kg (121 lb) Body mass index is 20 77 kg/m²  Physical Exam  Constitutional:       General: She is not in acute distress  Appearance: Normal appearance  She is well-developed and normal weight  Eyes:      General: No scleral icterus  Conjunctiva/sclera: Conjunctivae normal    Neck:      Vascular: No JVD  Cardiovascular:      Rate and Rhythm: Normal rate and regular rhythm  Heart sounds: Normal heart sounds  Pulmonary:      Effort: Pulmonary effort is normal       Breath sounds: Normal breath sounds  Abdominal:      General: Bowel sounds are normal       Palpations: Abdomen is soft  Skin:     General: Skin is warm and dry     Neurological:      Mental Status: She is alert " and oriented to person, place, and time  Psychiatric:         Behavior: Behavior normal  Behavior is cooperative         Medications:  Current Outpatient Medications:   •  Ascorbic Acid (vitamin C) 1000 MG tablet, Take 1,000 mg by mouth daily, Disp: , Rfl:   •  Multiple Vitamin (MULTIVITAMIN ADULT PO), Take 1 tablet by mouth in the morning, Disp: , Rfl:   •  Nerve Stimulator (TENS Therapy Pain Relief) EDGAR, Use 1 application 2 (two) times a day as needed (For left lumbar radiculopathy), Disp: 1 each, Rfl: 0  •  tacrolimus (PROGRAF) 1 mg capsule, Take 3 capsules (3 mg total) by mouth every 12 (twelve) hours 3 in the morning and 3 at night , Disp: 540 capsule, Rfl: 3  •  VITAMIN D, CHOLECALCIFEROL, PO, Take by mouth, Disp: , Rfl:     Laboratory Results:  Results for orders placed or performed in visit on 04/01/23   Magnesium   Result Value Ref Range    Magnesium, Serum 1 7 1 5 - 2 5 mg/dL   Comprehensive metabolic panel   Result Value Ref Range    Glucose, Random 83 65 - 99 mg/dL    BUN 11 7 - 25 mg/dL    Creatinine 0 55 0 50 - 0 99 mg/dL    eGFR 114 > OR = 60 mL/min/1 73m2    SL AMB BUN/CREATININE RATIO NOT APPLICABLE 6 - 22 (calc)    Sodium 138 135 - 146 mmol/L    Potassium 4 5 3 5 - 5 3 mmol/L    Chloride 105 98 - 110 mmol/L    CO2 27 20 - 32 mmol/L    Calcium 9 1 8 6 - 10 2 mg/dL    Protein, Total 6 9 6 1 - 8 1 g/dL    Albumin 4 2 3 6 - 5 1 g/dL    Globulin 2 7 1 9 - 3 7 g/dL (calc)    Albumin/Globulin Ratio 1 6 1 0 - 2 5 (calc)    TOTAL BILIRUBIN 0 8 0 2 - 1 2 mg/dL    Alkaline Phosphatase 33 31 - 125 U/L    AST 19 10 - 35 U/L    ALT 16 6 - 29 U/L   CBC and differential   Result Value Ref Range    White Blood Cell Count 7 4 3 8 - 10 8 Thousand/uL    Red Blood Cell Count 4 21 3 80 - 5 10 Million/uL    Hemoglobin 12 7 11 7 - 15 5 g/dL    HCT 38 9 35 0 - 45 0 %    MCV 92 4 80 0 - 100 0 fL    MCH 30 2 27 0 - 33 0 pg    MCHC 32 6 32 0 - 36 0 g/dL    RDW 11 4 11 0 - 15 0 %    Platelet Count 867 894 - 400 Thousand/uL SL AMB MPV 9 2 7 5 - 12 5 fL    Neutrophils (Absolute) 4,514 1,500 - 7,800 cells/uL    Lymphocytes (Absolute) 1,991 850 - 3,900 cells/uL    Monocytes (Absolute) 629 200 - 950 cells/uL    Eosinophils (Absolute) 178 15 - 500 cells/uL    Basophils ABS 89 0 - 200 cells/uL    Neutrophils 61 %    Lymphocytes 26 9 %    Monocytes 8 5 %    Eosinophils 2 4 %    Basophils PCT 1 2 %   Protein, Total w/Creat, Random Urine   Result Value Ref Range    Creatinine, Urine 19 (L) 20 - 275 mg/dL    Protein/Creat Ratio NOTE 24 - 184 mg/g creat    Protein/Creat Ratio NOTE 0 024 - 0 184 mg/mg creat    Total Protein, Urine <4 (L) 5 - 24 mg/dL   Tacrolimus level   Result Value Ref Range    Tacrolimus, Highly Sensitive, LC/MS/MS 4 9 (L) mcg/L   UA (URINE) with reflex to Scope   Result Value Ref Range    Color UA YELLOW YELLOW    Urine Appearance CLEAR CLEAR    Specific Gravity 1 006 1 001 - 1 035    Ph 7 0 5 0 - 8 0    Glucose, Urine NEGATIVE NEGATIVE    Bilirubin, Urine NEGATIVE NEGATIVE    Ketone, Urine NEGATIVE NEGATIVE    Blood, Urine NEGATIVE NEGATIVE    Protein, Urine NEGATIVE NEGATIVE    SL AMB NITRITES URINE, QUAL   NEGATIVE NEGATIVE    Leukocyte Esterase NEGATIVE NEGATIVE

## 2023-04-05 NOTE — PATIENT INSTRUCTIONS
The minimal-change disease appears to be stable  Your protein in the urine is currently controlled with tacrolimus  We will arrange for rituximab infusions  Please let us know once you get the second infusion so we can start planning for weaning tacrolimus  Follow-up in 4 months

## 2023-04-24 ENCOUNTER — HOSPITAL ENCOUNTER (OUTPATIENT)
Dept: INFUSION CENTER | Facility: CLINIC | Age: 48
Discharge: HOME | End: 2023-04-24

## 2023-04-24 VITALS
TEMPERATURE: 98 F | DIASTOLIC BLOOD PRESSURE: 65 MMHG | SYSTOLIC BLOOD PRESSURE: 130 MMHG | HEART RATE: 98 BPM | RESPIRATION RATE: 18 BRPM | OXYGEN SATURATION: 100 %

## 2023-04-24 DIAGNOSIS — N05.0 MINIMAL CHANGE DISEASE: Primary | ICD-10-CM

## 2023-04-24 RX ORDER — FAMOTIDINE 10 MG/ML
INJECTION, SOLUTION INTRAVENOUS
Status: COMPLETED
Start: 2023-04-24 | End: 2023-04-24

## 2023-04-24 RX ORDER — DIPHENHYDRAMINE HCL 25 MG
25 TABLET ORAL ONCE
Status: COMPLETED | OUTPATIENT
Start: 2023-04-24 | End: 2023-04-24

## 2023-04-24 RX ORDER — DIPHENHYDRAMINE HYDROCHLORIDE 50 MG/ML
50 INJECTION INTRAMUSCULAR; INTRAVENOUS ONCE
Status: COMPLETED | OUTPATIENT
Start: 2023-04-24 | End: 2023-04-24

## 2023-04-24 RX ORDER — ACETAMINOPHEN 325 MG/1
650 TABLET ORAL ONCE
Status: CANCELLED | OUTPATIENT
Start: 2023-05-05

## 2023-04-24 RX ORDER — METHYLPREDNISOLONE SODIUM SUCCINATE 40 MG/ML
20 INJECTION, POWDER, LYOPHILIZED, FOR SOLUTION INTRAMUSCULAR; INTRAVENOUS ONCE
Status: COMPLETED | OUTPATIENT
Start: 2023-04-24 | End: 2023-04-24

## 2023-04-24 RX ORDER — SODIUM CHLORIDE 9 MG/ML
20 INJECTION, SOLUTION INTRAVENOUS ONCE
Status: CANCELLED | OUTPATIENT
Start: 2023-05-05

## 2023-04-24 RX ORDER — DIPHENHYDRAMINE HCL 25 MG
25 TABLET ORAL ONCE
Status: CANCELLED | OUTPATIENT
Start: 2023-05-05

## 2023-04-24 RX ORDER — ACETAMINOPHEN 325 MG/1
650 TABLET ORAL ONCE
Status: COMPLETED | OUTPATIENT
Start: 2023-04-24 | End: 2023-04-24

## 2023-04-24 RX ORDER — SODIUM CHLORIDE 9 MG/ML
20 INJECTION, SOLUTION INTRAVENOUS ONCE
Status: COMPLETED | OUTPATIENT
Start: 2023-04-24 | End: 2023-04-24

## 2023-04-24 RX ADMIN — DIPHENHYDRAMINE HYDROCHLORIDE 25 MG: 25 TABLET ORAL at 09:46

## 2023-04-24 RX ADMIN — ACETAMINOPHEN 650 MG: 325 TABLET ORAL at 09:46

## 2023-04-24 RX ADMIN — SODIUM CHLORIDE 1000 MG: 0.9 INJECTION, SOLUTION INTRAVENOUS at 10:27

## 2023-04-24 RX ADMIN — FAMOTIDINE 20 MG: 10 INJECTION, SOLUTION INTRAVENOUS at 11:50

## 2023-04-24 RX ADMIN — DIPHENHYDRAMINE HYDROCHLORIDE 50 MG: 50 INJECTION, SOLUTION INTRAMUSCULAR; INTRAVENOUS at 11:47

## 2023-04-24 RX ADMIN — SODIUM CHLORIDE 20 ML/HR: 0.9 INJECTION, SOLUTION INTRAVENOUS at 09:47

## 2023-04-24 RX ADMIN — METHYLPREDNISOLONE SODIUM SUCCINATE 20 MG: 40 INJECTION, POWDER, FOR SOLUTION INTRAMUSCULAR; INTRAVENOUS at 11:52

## 2023-04-24 NOTE — PROGRESS NOTES
Patient here for rituxan  Patient resting with no complaints  Vitals stable  No lab parameters for treatment today - non oncology rituxan ordered by nephrology for minimal change disease  [facility] within reach of patient

## 2023-04-24 NOTE — PROGRESS NOTES
"Patient notified RN at 11:44 she had itching and felt \"weird\"  RN noted some hives around patient's head / ear/ side of the neck  Mild redness behind ears  No facial flushing or SOB  Patient also reported mild throat itching  Rituxan stopped, saline flowing, hypersensitivity medications given  Repeat vitals stable  Above reported to Dr Adan Francis  After patient's symptoms resolve okay to restart infusion at same rate  Patient on slower titration for first time dose  At 12:10-12:15 patient reports she is feeling better and symptoms improving but still having that feeling in her throat  At 12:30 patient reports symptoms resolved and infusion restarted  Patient's appointment for second dose adjusted to accommodate slower titration  Also spoke with Dr Adan Francis regarding second dose being scheduled on 5/5 instead of 5/8 which would be day 15 due to availability - this is okay  Orders placed in plan    "

## 2023-04-24 NOTE — PROGRESS NOTES
After medication restarted patient tolerated well with no issue  Patient tolerated 30 min observation with first dose with no issue   Patient aware of next appointment, declined AVS

## 2023-05-04 DIAGNOSIS — N05.0 MINIMAL CHANGE DISEASE: Primary | ICD-10-CM

## 2023-05-04 RX ORDER — DIPHENHYDRAMINE HCL 25 MG
25 TABLET ORAL ONCE
Status: CANCELLED | OUTPATIENT
Start: 2023-05-05

## 2023-05-04 RX ORDER — SODIUM CHLORIDE 9 MG/ML
20 INJECTION, SOLUTION INTRAVENOUS ONCE
Status: CANCELLED | OUTPATIENT
Start: 2023-05-05

## 2023-05-04 RX ORDER — ACETAMINOPHEN 325 MG/1
650 TABLET ORAL ONCE
Status: CANCELLED | OUTPATIENT
Start: 2023-05-05

## 2023-05-05 ENCOUNTER — HOSPITAL ENCOUNTER (OUTPATIENT)
Dept: INFUSION CENTER | Facility: CLINIC | Age: 48
End: 2023-05-05

## 2023-05-05 VITALS
DIASTOLIC BLOOD PRESSURE: 82 MMHG | WEIGHT: 121.5 LBS | BODY MASS INDEX: 20.86 KG/M2 | OXYGEN SATURATION: 99 % | TEMPERATURE: 99 F | HEART RATE: 80 BPM | RESPIRATION RATE: 16 BRPM | SYSTOLIC BLOOD PRESSURE: 126 MMHG

## 2023-05-05 DIAGNOSIS — N05.0 MINIMAL CHANGE DISEASE: Primary | ICD-10-CM

## 2023-05-05 RX ORDER — DIPHENHYDRAMINE HCL 25 MG
25 TABLET ORAL ONCE
Status: COMPLETED | OUTPATIENT
Start: 2023-05-05 | End: 2023-05-05

## 2023-05-05 RX ORDER — DIPHENHYDRAMINE HCL 25 MG
25 TABLET ORAL ONCE
OUTPATIENT
Start: 2023-05-06

## 2023-05-05 RX ORDER — SODIUM CHLORIDE 9 MG/ML
20 INJECTION, SOLUTION INTRAVENOUS ONCE
Status: COMPLETED | OUTPATIENT
Start: 2023-05-05 | End: 2023-05-05

## 2023-05-05 RX ORDER — ACETAMINOPHEN 325 MG/1
650 TABLET ORAL ONCE
OUTPATIENT
Start: 2023-05-06

## 2023-05-05 RX ORDER — ACETAMINOPHEN 325 MG/1
650 TABLET ORAL ONCE
Status: COMPLETED | OUTPATIENT
Start: 2023-05-05 | End: 2023-05-05

## 2023-05-05 RX ORDER — SODIUM CHLORIDE 9 MG/ML
20 INJECTION, SOLUTION INTRAVENOUS ONCE
OUTPATIENT
Start: 2023-05-06

## 2023-05-05 RX ADMIN — ACETAMINOPHEN 650 MG: 325 TABLET ORAL at 08:55

## 2023-05-05 RX ADMIN — SODIUM CHLORIDE 20 ML/HR: 0.9 INJECTION, SOLUTION INTRAVENOUS at 08:56

## 2023-05-05 RX ADMIN — SODIUM CHLORIDE 1000 MG: 0.9 INJECTION, SOLUTION INTRAVENOUS at 10:14

## 2023-05-05 RX ADMIN — DIPHENHYDRAMINE HYDROCHLORIDE 25 MG: 25 TABLET ORAL at 08:55

## 2023-05-05 NOTE — PROGRESS NOTES
Patient tolerated treatment today without incident  She has no other ordered treatments    She declined AVS

## 2023-05-05 NOTE — PROGRESS NOTES
Patient presents today for Ritxuan infusion  Patient offers no complaints  VSS  Peripheral IV inserted without incident  Per note, okay to proceed with treatment earlier than scheduled day 15

## 2023-05-10 ENCOUNTER — TELEPHONE (OUTPATIENT)
Dept: NEPHROLOGY | Facility: CLINIC | Age: 48
End: 2023-05-10

## 2023-05-10 NOTE — TELEPHONE ENCOUNTER
Kita Moreno called to let Dr Dylan Gleason know she completed her infusions  He asked her to call so he can wean her off the medication  Currently she is:   Sig - Route: Take 3 capsules (3 mg total) by mouth every 12 (twelve) hours 3 in the morning and 3 at night  - Oral    Sent to pharmacy as:  Tacrolimus 1 MG Oral Capsule (PROGRAF)      Please call her to advise: [phone]

## 2023-05-16 ENCOUNTER — DOCUMENTATION (OUTPATIENT)
Dept: NEPHROLOGY | Facility: CLINIC | Age: 48
End: 2023-05-16

## 2023-06-07 ENCOUNTER — VBI (OUTPATIENT)
Dept: ADMINISTRATIVE | Facility: OTHER | Age: 48
End: 2023-06-07

## 2023-06-15 ENCOUNTER — TELEPHONE (OUTPATIENT)
Dept: NEPHROLOGY | Facility: CLINIC | Age: 48
End: 2023-06-15

## 2023-06-15 NOTE — TELEPHONE ENCOUNTER
Spoke to patient over the phone  Ideally, should avoid alcohol while on Tac  However, since it is not prescribed for a renal transplant  I explained that I do not have an objection to a glass once a day

## 2023-06-15 NOTE — TELEPHONE ENCOUNTER
Called pt to reschedule her upcoming appt to sooner with Dr Mike Grayson  Pt also wanted to know if drinking alcohol is okay with the medication she's on  She's going on vacation and just wants to make sure that drinking will be safe

## 2023-08-22 ENCOUNTER — TELEPHONE (OUTPATIENT)
Dept: NEPHROLOGY | Facility: CLINIC | Age: 48
End: 2023-08-22

## 2023-08-22 LAB
ALBUMIN SERPL-MCNC: 4.3 G/DL (ref 3.6–5.1)
ALBUMIN/GLOB SERPL: 1.6 (CALC) (ref 1–2.5)
ALP SERPL-CCNC: 39 U/L (ref 31–125)
ALT SERPL-CCNC: 14 U/L (ref 6–29)
APPEARANCE UR: CLEAR
AST SERPL-CCNC: 20 U/L (ref 10–35)
BASOPHILS # BLD AUTO: 68 CELLS/UL (ref 0–200)
BASOPHILS NFR BLD AUTO: 1.1 %
BILIRUB SERPL-MCNC: 1.1 MG/DL (ref 0.2–1.2)
BILIRUB UR QL STRIP: NEGATIVE
BUN SERPL-MCNC: 11 MG/DL (ref 7–25)
BUN/CREAT SERPL: NORMAL (CALC) (ref 6–22)
CALCIUM SERPL-MCNC: 9.2 MG/DL (ref 8.6–10.2)
CHLORIDE SERPL-SCNC: 103 MMOL/L (ref 98–110)
CO2 SERPL-SCNC: 27 MMOL/L (ref 20–32)
COLOR UR: YELLOW
CREAT SERPL-MCNC: 0.62 MG/DL (ref 0.5–0.99)
CREAT UR-MCNC: 135 MG/DL (ref 20–275)
EOSINOPHIL # BLD AUTO: 198 CELLS/UL (ref 15–500)
EOSINOPHIL NFR BLD AUTO: 3.2 %
ERYTHROCYTE [DISTWIDTH] IN BLOOD BY AUTOMATED COUNT: 11.7 % (ref 11–15)
GFR/BSA.PRED SERPLBLD CYS-BASED-ARV: 111 ML/MIN/1.73M2
GLOBULIN SER CALC-MCNC: 2.7 G/DL (CALC) (ref 1.9–3.7)
GLUCOSE SERPL-MCNC: 83 MG/DL (ref 65–99)
GLUCOSE UR QL STRIP: NEGATIVE
HCT VFR BLD AUTO: 38.9 % (ref 35–45)
HGB BLD-MCNC: 13 G/DL (ref 11.7–15.5)
HGB UR QL STRIP: NEGATIVE
KETONES UR QL STRIP: ABNORMAL
LEUKOCYTE ESTERASE UR QL STRIP: NEGATIVE
LYMPHOCYTES # BLD AUTO: 1314 CELLS/UL (ref 850–3900)
LYMPHOCYTES NFR BLD AUTO: 21.2 %
MCH RBC QN AUTO: 30.4 PG (ref 27–33)
MCHC RBC AUTO-ENTMCNC: 33.4 G/DL (ref 32–36)
MCV RBC AUTO: 90.9 FL (ref 80–100)
MONOCYTES # BLD AUTO: 614 CELLS/UL (ref 200–950)
MONOCYTES NFR BLD AUTO: 9.9 %
NEUTROPHILS # BLD AUTO: 4005 CELLS/UL (ref 1500–7800)
NEUTROPHILS NFR BLD AUTO: 64.6 %
NITRITE UR QL STRIP: NEGATIVE
PH UR STRIP: 6 [PH] (ref 5–8)
PLATELET # BLD AUTO: 355 THOUSAND/UL (ref 140–400)
PMV BLD REES-ECKER: 9.5 FL (ref 7.5–12.5)
POTASSIUM SERPL-SCNC: 3.9 MMOL/L (ref 3.5–5.3)
PROT SERPL-MCNC: 7 G/DL (ref 6.1–8.1)
PROT UR QL STRIP: NEGATIVE
PROT UR-MCNC: 9 MG/DL (ref 5–24)
PROT/CREAT UR: 0.07 MG/MG CREAT (ref 0.02–0.18)
PROT/CREAT UR: 67 MG/G CREAT (ref 24–184)
RBC # BLD AUTO: 4.28 MILLION/UL (ref 3.8–5.1)
SODIUM SERPL-SCNC: 138 MMOL/L (ref 135–146)
SP GR UR STRIP: 1.02 (ref 1–1.03)
TACROLIMUS BLD-MCNC: 2.4 MCG/L
WBC # BLD AUTO: 6.2 THOUSAND/UL (ref 3.8–10.8)

## 2023-08-22 NOTE — TELEPHONE ENCOUNTER
Critical lab reading for her tacrolimus level. tigertexted Elizabeth since Glory Stock is out for the week.

## 2023-08-29 ENCOUNTER — OFFICE VISIT (OUTPATIENT)
Dept: NEPHROLOGY | Facility: CLINIC | Age: 48
End: 2023-08-29
Payer: COMMERCIAL

## 2023-08-29 VITALS
WEIGHT: 118.8 LBS | DIASTOLIC BLOOD PRESSURE: 78 MMHG | SYSTOLIC BLOOD PRESSURE: 108 MMHG | BODY MASS INDEX: 20.28 KG/M2 | HEART RATE: 103 BPM | HEIGHT: 64 IN

## 2023-08-29 DIAGNOSIS — R80.9 PROTEINURIA, UNSPECIFIED TYPE: ICD-10-CM

## 2023-08-29 DIAGNOSIS — N05.0 MINIMAL CHANGE DISEASE: ICD-10-CM

## 2023-08-29 DIAGNOSIS — N04.0 NEPHROTIC SYNDROME WITH LESION OF MINIMAL CHANGE GLOMERULONEPHRITIS: Primary | ICD-10-CM

## 2023-08-29 PROBLEM — E88.09 HYPOALBUMINEMIA: Status: RESOLVED | Noted: 2019-09-22 | Resolved: 2023-08-29

## 2023-08-29 PROCEDURE — 99214 OFFICE O/P EST MOD 30 MIN: CPT | Performed by: INTERNAL MEDICINE

## 2023-08-29 RX ORDER — TACROLIMUS 1 MG/1
1 CAPSULE ORAL EVERY 12 HOURS
Qty: 540 CAPSULE | Refills: 3
Start: 2023-08-29

## 2023-08-29 RX ORDER — DIPHENHYDRAMINE HCL 25 MG
25 TABLET ORAL ONCE
OUTPATIENT
Start: 2023-11-05

## 2023-08-29 RX ORDER — FEXOFENADINE HCL 60 MG/1
60 TABLET, FILM COATED ORAL DAILY
COMMUNITY

## 2023-08-29 RX ORDER — SODIUM CHLORIDE 9 MG/ML
20 INJECTION, SOLUTION INTRAVENOUS ONCE
OUTPATIENT
Start: 2023-11-05

## 2023-08-29 RX ORDER — METHYLPREDNISOLONE SODIUM SUCCINATE 125 MG/2ML
125 INJECTION, POWDER, LYOPHILIZED, FOR SOLUTION INTRAMUSCULAR; INTRAVENOUS ONCE
OUTPATIENT
Start: 2023-11-05

## 2023-08-29 RX ORDER — ACETAMINOPHEN 325 MG/1
650 TABLET ORAL ONCE
OUTPATIENT
Start: 2023-11-05

## 2023-08-29 NOTE — PROGRESS NOTES
NEPHROLOGY OFFICE PROGRESS NOTE   Renard Segal 57 y.o. female MRN: [id number]  DATE: 08/29/23  Reason for visit: Continued evaluation and management of SHANTEL    ASSESSMENT & PLAN:  1. Nephrotic syndrome due to minimal change disease:  • Summary: Diagnosed in Sep 2019 and responded well to Prednisone - Was treated with Prednisone from 10/1/19 to 4/4/20. Had relapse of SHANTEL in May 2020 and restarted Prednisone on 5/18/20. Unfortunately, she had a relapse in Dec 2020 while Prednisone was being titrated down. Prednisone was increased and Tacrolimus was added on 2/3/21. Prednisone has been weaned off since June 2021. Remission was maintained with Tacrolimus but would have increase in proteinuria when Tacrolimus is weaned. • Started Rituximab 1000 mg x 2 doses (April 24, 2023 and May 5, 2023)  • Currently remains in remission. • Now on Tacrolimus 2 mg BID - Will decrease Tacro to 1 mg BID and stop after 6 weeks. • Redose Rituximab 1000 mg IV x 1 in Nov 2023. • Check labs prior to dose of Rituximab and then 2 months and 4 months after dose of Rituximab. 2. Prophylaxis:  • Continue Vitamin D3 1000 units once a day.      3. Hx of hyperlipidemia:  • Resolved. • Off Atorvastatin since December 2019.        Patient Instructions   Your labs look great. There is no protein in the urine. Please decrease your tacrolimus to 1 mg twice a day and stop after 6 weeks. We will arrange for 1 dose of Rituximab in November 2023. Check lab work in first week of Nov 2023, Jan 2024 and March 2024. Follow up in 6 months. SUBJECTIVE / INTERVAL HISTORY:  Kanu Wilson was last seen by me in the office in April 2023. During the last visit, we arranged for rituximab. She had a reaction during the first infusion of rituximab which manifested as feeling weird and itching. She was treated with the hypersensitivity protocol. She did not have any issues with the second infusion of rituximab.   She is currently on tacrolimus 2 mg twice a day. No unplanned hospitalizations or ER visits. She is doing well overall. PMH/PSH: SHANTEL.      Previous work up:   September 25, 2019 Renal biopsy:  1. Minimal change disease  2. Arteriosclerosis, mild to moderate.     9/23/19: ZAYNAB negative, KL ratio 0.88, UPEP No M spike, SPEP No M spike, Hep B and C NR, C3 127, C4 30, ANCA negative, anti PLA2R Ab negative     9/23/19 Renal US: R 11.8 cm, L 11.4 cm, no hydronephrosis. ALLERGIES: No Known Allergies    REVIEW OF SYSTEMS:  Review of Systems   Constitutional: Negative for appetite change, chills, fatigue and fever. Respiratory: Negative for cough and shortness of breath. Cardiovascular: Negative for chest pain and leg swelling. Gastrointestinal: Negative for abdominal pain, diarrhea, nausea and vomiting. Genitourinary: Negative for dysuria and hematuria. Musculoskeletal: Negative for arthralgias and back pain. Neurological: Negative for dizziness and light-headedness. OBJECTIVE:  /78 (BP Location: Left arm, Patient Position: Sitting, Cuff Size: Adult)   Pulse 103   Ht 5' 4" (1.626 m)   Wt 53.9 kg (118 lb 12.8 oz)   BMI 20.39 kg/m²   Current Weight: Weight - Scale: 53.9 kg (118 lb 12.8 oz) Body mass index is 20.39 kg/m². Physical Exam  Constitutional:       General: She is not in acute distress. Appearance: Normal appearance. She is well-developed and normal weight. She is not ill-appearing or diaphoretic. HENT:      Head: Normocephalic and atraumatic. Eyes:      General: No scleral icterus. Conjunctiva/sclera: Conjunctivae normal.   Neck:      Vascular: No JVD. Cardiovascular:      Rate and Rhythm: Normal rate and regular rhythm. Heart sounds: Normal heart sounds. Pulmonary:      Effort: Pulmonary effort is normal.      Breath sounds: Normal breath sounds. Abdominal:      General: Bowel sounds are normal.      Palpations: Abdomen is soft. Musculoskeletal:      Right lower leg: No edema.       Left lower leg: No edema. Skin:     General: Skin is warm and dry. Neurological:      Mental Status: She is alert and oriented to person, place, and time. Psychiatric:         Behavior: Behavior normal. Behavior is cooperative.        Medications:  Current Outpatient Medications:   •  Ascorbic Acid (vitamin C) 1000 MG tablet, Take 1,000 mg by mouth daily, Disp: , Rfl:   •  fexofenadine (ALLEGRA) 60 MG tablet, Take 60 mg by mouth daily, Disp: , Rfl:   •  Multiple Vitamin (MULTIVITAMIN ADULT PO), Take 1 tablet by mouth in the morning, Disp: , Rfl:   •  Nerve Stimulator (TENS Therapy Pain Relief) EDGAR, Use 1 application 2 (two) times a day as needed (For left lumbar radiculopathy), Disp: 1 each, Rfl: 0  •  tacrolimus (PROGRAF) 1 mg capsule, Take 3 capsules (3 mg total) by mouth every 12 (twelve) hours 3 in the morning and 3 at night., Disp: 540 capsule, Rfl: 3  •  VITAMIN D, CHOLECALCIFEROL, PO, Take by mouth, Disp: , Rfl:     Laboratory Results:  Results for orders placed or performed in visit on 08/19/23   Comprehensive metabolic panel   Result Value Ref Range    Glucose, Random 83 65 - 99 mg/dL    BUN 11 7 - 25 mg/dL    Creatinine 0.62 0.50 - 0.99 mg/dL    eGFR 111 > OR = 60 mL/min/1.73m2    SL AMB BUN/CREATININE RATIO SEE NOTE: 6 - 22 (calc)    Sodium 138 135 - 146 mmol/L    Potassium 3.9 3.5 - 5.3 mmol/L    Chloride 103 98 - 110 mmol/L    CO2 27 20 - 32 mmol/L    Calcium 9.2 8.6 - 10.2 mg/dL    Protein, Total 7.0 6.1 - 8.1 g/dL    Albumin 4.3 3.6 - 5.1 g/dL    Globulin 2.7 1.9 - 3.7 g/dL (calc)    Albumin/Globulin Ratio 1.6 1.0 - 2.5 (calc)    TOTAL BILIRUBIN 1.1 0.2 - 1.2 mg/dL    Alkaline Phosphatase 39 31 - 125 U/L    AST 20 10 - 35 U/L    ALT 14 6 - 29 U/L   CBC and differential   Result Value Ref Range    White Blood Cell Count 6.2 3.8 - 10.8 Thousand/uL    Red Blood Cell Count 4.28 3.80 - 5.10 Million/uL    Hemoglobin 13.0 11.7 - 15.5 g/dL    HCT 38.9 35.0 - 45.0 %    MCV 90.9 80.0 - 100.0 fL    MCH 30.4 27.0 - 33.0 pg    MCHC 33.4 32.0 - 36.0 g/dL    RDW 11.7 11.0 - 15.0 %    Platelet Count 870 995 - 400 Thousand/uL    SL AMB MPV 9.5 7.5 - 12.5 fL    Neutrophils (Absolute) 4,005 1,500 - 7,800 cells/uL    Lymphocytes (Absolute) 1,314 850 - 3,900 cells/uL    Monocytes (Absolute) 614 200 - 950 cells/uL    Eosinophils (Absolute) 198 15 - 500 cells/uL    Basophils ABS 68 0 - 200 cells/uL    Neutrophils 64.6 %    Lymphocytes 21.2 %    Monocytes 9.9 %    Eosinophils 3.2 %    Basophils PCT 1.1 %    Always Message     Protein, Total w/Creat, Random Urine   Result Value Ref Range    Creatinine, Urine 135 20 - 275 mg/dL    Protein/Creat Ratio 67 24 - 184 mg/g creat    Protein/Creat Ratio 0.067 0.024 - 0.184 mg/mg creat    Total Protein, Urine 9 5 - 24 mg/dL   Tacrolimus level   Result Value Ref Range    Tacrolimus, Highly Sensitive, LC/MS/MS 2.4 (L) mcg/L   UA (URINE) with reflex to Scope   Result Value Ref Range    Color UA YELLOW YELLOW    Urine Appearance CLEAR CLEAR    Specific Gravity 1.021 1.001 - 1.035    Ph 6.0 5.0 - 8.0    Glucose, Urine NEGATIVE NEGATIVE    Bilirubin, Urine NEGATIVE NEGATIVE    Ketone, Urine TRACE (A) NEGATIVE    Blood, Urine NEGATIVE NEGATIVE    Protein, Urine NEGATIVE NEGATIVE    Nitrites Urine NEGATIVE NEGATIVE    Leukocyte Esterase NEGATIVE NEGATIVE

## 2023-08-29 NOTE — PATIENT INSTRUCTIONS
Your labs look great. There is no protein in the urine. Please decrease your tacrolimus to 1 mg twice a day and stop after 6 weeks. We will arrange for 1 dose of Rituximab in November 2023. Check lab work in first week of Nov 2023, Jan 2024 and March 2024. Follow up in 6 months.

## 2023-10-04 RX ORDER — MECLIZINE HCL 12.5 MG/1
12.5 TABLET ORAL
COMMUNITY
Start: 2023-09-28 | End: 2023-10-08

## 2023-10-04 RX ORDER — OXYCODONE HYDROCHLORIDE 5 MG/1
5 TABLET ORAL EVERY 4 HOURS PRN
COMMUNITY
Start: 2023-09-28 | End: 2023-10-28

## 2023-10-06 ENCOUNTER — OFFICE VISIT (OUTPATIENT)
Dept: INTERNAL MEDICINE CLINIC | Facility: CLINIC | Age: 48
End: 2023-10-06
Payer: COMMERCIAL

## 2023-10-06 ENCOUNTER — TRANSITIONAL CARE MANAGEMENT (OUTPATIENT)
Dept: INTERNAL MEDICINE CLINIC | Facility: CLINIC | Age: 48
End: 2023-10-06

## 2023-10-06 ENCOUNTER — TELEPHONE (OUTPATIENT)
Age: 48
End: 2023-10-06

## 2023-10-06 VITALS
DIASTOLIC BLOOD PRESSURE: 82 MMHG | OXYGEN SATURATION: 99 % | HEIGHT: 64 IN | HEART RATE: 83 BPM | WEIGHT: 116.8 LBS | BODY MASS INDEX: 19.94 KG/M2 | SYSTOLIC BLOOD PRESSURE: 130 MMHG

## 2023-10-06 DIAGNOSIS — D25.2 FIBROIDS, SUBSEROUS: ICD-10-CM

## 2023-10-06 DIAGNOSIS — S06.0X9A CONCUSSION WITH LOSS OF CONSCIOUSNESS, INITIAL ENCOUNTER: ICD-10-CM

## 2023-10-06 DIAGNOSIS — S00.93XD: Primary | ICD-10-CM

## 2023-10-06 DIAGNOSIS — R42 VERTIGO: ICD-10-CM

## 2023-10-06 DIAGNOSIS — S06.0X9A CONCUSSION WITH LOSS OF CONSCIOUSNESS, INITIAL ENCOUNTER: Primary | ICD-10-CM
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
## 2023-10-06 PROBLEM — S00.93XA TRAUMATIC CEPHALOHEMATOMA: Status: ACTIVE | Noted: 2023-10-06

## 2023-10-06 PROCEDURE — 99495 TRANSJ CARE MGMT MOD F2F 14D: CPT | Performed by: INTERNAL MEDICINE

## 2023-10-06 NOTE — TELEPHONE ENCOUNTER
Referral has been made to physical therapy with an emphasis on treatment for her concussion. Please provide patient with the phone number for our physical therapy group.

## 2023-10-06 NOTE — TELEPHONE ENCOUNTER
Patient was seen in office today and was given a referral  For Concussion Program but when patient tried to schedule they only see patients ages 7-18. The office stated she would need either a Neurologist or  PT referral .Please advise.
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
## 2023-10-06 NOTE — ASSESSMENT & PLAN NOTE
- Patient's current symptoms of dizziness/vertigo are likely secondary to post concussive symptoms.   A referral has been made to our concussion program.

## 2023-10-10 ENCOUNTER — EVALUATION (OUTPATIENT)
Dept: PHYSICAL THERAPY | Facility: CLINIC | Age: 48
End: 2023-10-10
Payer: COMMERCIAL

## 2023-10-10 DIAGNOSIS — R42 VERTIGO: ICD-10-CM

## 2023-10-10 DIAGNOSIS — S06.0X9A CONCUSSION WITH LOSS OF CONSCIOUSNESS, INITIAL ENCOUNTER: Primary | ICD-10-CM

## 2023-10-10 PROCEDURE — 97140 MANUAL THERAPY 1/> REGIONS: CPT | Performed by: PHYSICAL THERAPIST

## 2023-10-10 PROCEDURE — 97161 PT EVAL LOW COMPLEX 20 MIN: CPT | Performed by: PHYSICAL THERAPIST
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
## 2023-10-11 ENCOUNTER — TELEPHONE (OUTPATIENT)
Dept: OTHER | Facility: OTHER | Age: 48
End: 2023-10-11

## 2023-10-11 NOTE — TELEPHONE ENCOUNTER
Prior authorization dept called and received pre authorization from [facility]. Office needs to contact [employer] to process the pre authorization at [phone], due to issues with website.

## 2023-10-12 ENCOUNTER — OFFICE VISIT (OUTPATIENT)
Dept: PHYSICAL THERAPY | Facility: CLINIC | Age: 48
End: 2023-10-12
Payer: COMMERCIAL

## 2023-10-12 DIAGNOSIS — R42 VERTIGO: ICD-10-CM

## 2023-10-12 DIAGNOSIS — S06.0X9A CONCUSSION WITH LOSS OF CONSCIOUSNESS, INITIAL ENCOUNTER: Primary | ICD-10-CM

## 2023-10-12 PROCEDURE — 97112 NEUROMUSCULAR REEDUCATION: CPT | Performed by: PHYSICAL THERAPIST

## 2023-10-12 PROCEDURE — 97530 THERAPEUTIC ACTIVITIES: CPT | Performed by: PHYSICAL THERAPIST

## 2023-10-12 NOTE — LETTER
2023    JENNY Umana    Patient: Clint Ruiz   YOB: 1975   Date of Visit: 10/12/2023     Encounter Diagnosis     ICD-10-CM    1. Concussion with loss of consciousness, initial encounter  S06. 0X9A       2. Vertigo  R42           Dear Dr. Velazco Mis:    Thank you for your recent referral of Clint Ruiz. Please review the attached evaluation summary from Bessy's recent visit. Please verify that you agree with the plan of care by signing the attached order. If you have any questions or concerns, please do not hesitate to call. I sincerely appreciate the opportunity to share in the care of one of your patients and hope to have another opportunity to work with you in the near future. Sincerely,    Germania Nur, PT      Referring Provider:      I certify that I have read the below Plan of Care and certify the need for these services furnished under this plan of treatment while under my care. JENNY Umana  [facility] [place]          Daily Note/Discharge    Today's date: 10/12/2023  Patient name: Clint Ruiz  : 1975  MRN: [id number]  Referring provider: JENNY Umana  Dx:   Encounter Diagnosis     ICD-10-CM    1. Concussion with loss of consciousness, initial encounter  S06. 0X9A       2. Vertigo  R42                      Subjective:  Pt reports no dizziness sx since last visit. Objective: See treatment diary below    Assessment: Pt demonstrated full c/s ROM, no sx reproduction with seated lumbar flex, standing lumbar ext, supine to sit or sit to stand. Pt was able to lie in supine and rotation her head to the R without dizziness. (-) [id number] B. Plan: D/c to HEP, issued self Epley in the case of reoccurrence. Precautions: none. Dx: R post canal BPPV.     Manuals 10/10 10/12           Epley to R x2                                                   Neuro Re-Ed             SLS EC  1x30" ea            SLS C/s retraction             Self Epley   Flat x1, 1 pillow x 1           Pt education  10 min                                                  Ther Ex                                                                                                                     Ther Activity             Functional screen  STS, supine-sit, c/s ROM in all planes, lumbar flex,  R c/s Rot                        Gait Training                                       Modalities

## 2023-10-12 NOTE — PROGRESS NOTES
Daily Note/Discharge    Today's date: 10/12/2023  Patient name: Bridger Serrano  : 1975  MRN: [id number]  Referring provider: [place], CRNP  Dx:   Encounter Diagnosis     ICD-10-CM    1. Concussion with loss of consciousness, initial encounter  S06. 0X9A       2. Vertigo  R42                      Subjective:  Pt reports no dizziness sx since last visit. Objective: See treatment diary below    Assessment: Pt demonstrated full c/s ROM, no sx reproduction with seated lumbar flex, standing lumbar ext, supine to sit or sit to stand. Pt was able to lie in supine and rotation her head to the R without dizziness. (-) Pérez B. Plan: D/c to HEP, issued self Epley in the case of reoccurrence. Precautions: none. Dx: R post canal BPPV.     Manuals 10/10 10/12           Epley to R x2                                                   Neuro Re-Ed             SLS EC  1x30" ea            SLS             C/s retraction             Self Epley   Flat x1, 1 pillow x 1           Pt education  10 min                                                  Ther Ex                                                                                                                     Ther Activity             Functional screen  STS, supine-sit, c/s ROM in all planes, lumbar flex,  R c/s Rot                        Gait Training                                       Modalities

## 2023-10-12 NOTE — LETTER
2023    JENNY Rosario    Patient: Alex Client   YOB: 1975   Date of Visit: 10/12/2023     Encounter Diagnosis     ICD-10-CM    1. Concussion with loss of consciousness, initial encounter  S06. 0X9A       2. Vertigo  R42           Dear Dr. Soila Acsota:    Thank you for your recent referral of Rush Client. Please review the attached evaluation summary from Bessy's recent visit. Please verify that you agree with the plan of care by signing the attached order. If you have any questions or concerns, please do not hesitate to call. I sincerely appreciate the opportunity to share in the care of one of your patients and hope to have another opportunity to work with you in the near future. Sincerely,    Boy Blanchard, PT      Referring Provider:      I certify that I have read the below Plan of Care and certify the need for these services furnished under this plan of treatment while under my care. JENNY Rosario  [facility] [place]          Daily Note/Discharge    Today's date: 10/12/2023  Patient name: Alex Client  : 1975  MRN: [id number]  Referring provider: JENNY Rosario  Dx:   Encounter Diagnosis     ICD-10-CM    1. Concussion with loss of consciousness, initial encounter  S06. 0X9A       2. Vertigo  R42                      Subjective:  Pt reports no dizziness sx since last visit. Objective: See treatment diary below    Assessment: Pt demonstrated full c/s ROM, no sx reproduction with seated lumbar flex, standing lumbar ext, supine to sit or sit to stand. Pt was able to lie in supine and rotation her head to the R without dizziness. (-) [id number] B. Plan: D/c to HEP, issued self Epley in the case of reoccurrence. Precautions: none. Dx: R post canal BPPV.     Manuals 10/10 10/12           Epley to R x2                                                   Neuro Re-Ed             SLS EC  1x30" ea            SLS C/s retraction             Self Epley   Flat x1, 1 pillow x 1           Pt education  10 min                                                  Ther Ex                                                                                                                     Ther Activity             Functional screen  STS, supine-sit, c/s ROM in all planes, lumbar flex,  R c/s Rot                        Gait Training                                       Modalities

## 2023-10-13 ENCOUNTER — OFFICE VISIT (OUTPATIENT)
Dept: INTERNAL MEDICINE CLINIC | Facility: CLINIC | Age: 48
End: 2023-10-13
Payer: COMMERCIAL

## 2023-10-13 ENCOUNTER — TELEPHONE (OUTPATIENT)
Dept: NEPHROLOGY | Facility: CLINIC | Age: 48
End: 2023-10-13

## 2023-10-13 VITALS
HEIGHT: 64 IN | DIASTOLIC BLOOD PRESSURE: 74 MMHG | SYSTOLIC BLOOD PRESSURE: 108 MMHG | BODY MASS INDEX: 20.35 KG/M2 | HEART RATE: 82 BPM | WEIGHT: 119.2 LBS | OXYGEN SATURATION: 99 % | RESPIRATION RATE: 16 BRPM
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
## 2023-10-13 DIAGNOSIS — S06.0X9D CONCUSSION WITH LOSS OF CONSCIOUSNESS, SUBSEQUENT ENCOUNTER: Primary | ICD-10-CM

## 2023-10-13 DIAGNOSIS — S00.93XD: ICD-10-CM

## 2023-10-13 DIAGNOSIS — R42 VERTIGO: ICD-10-CM

## 2023-10-13 PROCEDURE — 99214 OFFICE O/P EST MOD 30 MIN: CPT | Performed by: INTERNAL MEDICINE

## 2023-10-13 NOTE — TELEPHONE ENCOUNTER
[facility] called to request if the patient could be switched to Rituxan for her infusion. I explained that is what we have ordered. She explained the Jcode that they had on file, [id number], would be changed to  which is what the patient had previously been approved for.
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
## 2023-11-01 DIAGNOSIS — N05.0 MINIMAL CHANGE DISEASE: Primary | ICD-10-CM

## 2023-11-01 LAB
ALBUMIN SERPL-MCNC: 4.3 G/DL (ref 3.6–5.1)
ALBUMIN/GLOB SERPL: 1.5 (CALC) (ref 1–2.5)
ALP SERPL-CCNC: 40 U/L (ref 31–125)
ALT SERPL-CCNC: 20 U/L (ref 6–29)
APPEARANCE UR: CLEAR
AST SERPL-CCNC: 24 U/L (ref 10–35)
BILIRUB SERPL-MCNC: 0.5 MG/DL (ref 0.2–1.2)
BILIRUB UR QL STRIP: NEGATIVE
BUN SERPL-MCNC: 11 MG/DL (ref 7–25)
BUN/CREAT SERPL: NORMAL (CALC) (ref 6–22)
CALCIUM SERPL-MCNC: 9.2 MG/DL (ref 8.6–10.2)
CHLORIDE SERPL-SCNC: 103 MMOL/L (ref 98–110)
CO2 SERPL-SCNC: 28 MMOL/L (ref 20–32)
COLOR UR: YELLOW
CREAT SERPL-MCNC: 0.68 MG/DL (ref 0.5–0.99)
CREAT UR-MCNC: 144 MG/DL (ref 20–275)
ERYTHROCYTE [DISTWIDTH] IN BLOOD BY AUTOMATED COUNT: 11.5 % (ref 11–15)
GFR/BSA.PRED SERPLBLD CYS-BASED-ARV: 109 ML/MIN/1.73M2
GLOBULIN SER CALC-MCNC: 2.9 G/DL (CALC) (ref 1.9–3.7)
GLUCOSE SERPL-MCNC: 82 MG/DL (ref 65–99)
GLUCOSE UR QL STRIP: NEGATIVE
HCT VFR BLD AUTO: 38.7 % (ref 35–45)
HGB BLD-MCNC: 12.9 G/DL (ref 11.7–15.5)
HGB UR QL STRIP: NEGATIVE
KETONES UR QL STRIP: ABNORMAL
LEUKOCYTE ESTERASE UR QL STRIP: NEGATIVE
MCH RBC QN AUTO: 29.5 PG (ref 27–33)
MCHC RBC AUTO-ENTMCNC: 33.3 G/DL (ref 32–36)
MCV RBC AUTO: 88.6 FL (ref 80–100)
NITRITE UR QL STRIP: NEGATIVE
PH UR STRIP: 6.5 [PH] (ref 5–8)
PLATELET # BLD AUTO: 283 THOUSAND/UL (ref 140–400)
PMV BLD REES-ECKER: 9.3 FL (ref 7.5–12.5)
POTASSIUM SERPL-SCNC: 4.4 MMOL/L (ref 3.5–5.3)
PROT SERPL-MCNC: 7.2 G/DL (ref 6.1–8.1)
PROT UR QL STRIP: NEGATIVE
PROT UR-MCNC: 16 MG/DL (ref 5–24)
PROT/CREAT UR: 0.11 MG/MG CREAT (ref 0.02–0.18)
PROT/CREAT UR: 111 MG/G CREAT (ref 24–184)
RBC # BLD AUTO: 4.37 MILLION/UL (ref 3.8–5.1)
SODIUM SERPL-SCNC: 137 MMOL/L (ref 135–146)
SP GR UR STRIP: 1.02 (ref 1–1.03)
WBC # BLD AUTO: 4.3 THOUSAND/UL (ref 3.8–10.8)

## 2023-11-02 ENCOUNTER — TELEPHONE (OUTPATIENT)
Dept: NEPHROLOGY | Facility: CLINIC | Age: 48
End: 2023-11-02

## 2023-11-02 NOTE — TELEPHONE ENCOUNTER
Message left on patient's VM that labs of 11/1/23 show stable kidney function and no protein per Dr. More Rodriguez.      ----- Message from Macario Rincon MD sent at 11/2/2023  1:20 PM EDT -----  Please inform patient that most recent labs (11/1/23) show that labs are stable and there is no proteinuria.

## 2023-11-03 ENCOUNTER — TELEPHONE (OUTPATIENT)
Dept: NEPHROLOGY | Facility: CLINIC | Age: 48
End: 2023-11-03

## 2023-11-03 NOTE — TELEPHONE ENCOUNTER
Pt advised to get COVID booster now before her scheduled Rituxan infusion per  Dr. Александр Mitchell.

## 2023-11-03 NOTE — TELEPHONE ENCOUNTER
Pt had flu vaccine last weekend and her Rituxan infusion is scheduled for 11/17/23. Her employment is now offering the COVID booster. She is asking if she should get it and if so, can she get it now before her infusion or should she wait?

## 2023-11-07 RX ORDER — METHYLPREDNISOLONE SODIUM SUCCINATE 125 MG/2ML
125 INJECTION, POWDER, LYOPHILIZED, FOR SOLUTION INTRAMUSCULAR; INTRAVENOUS ONCE
Status: CANCELLED | OUTPATIENT
Start: 2023-11-10

## 2023-11-07 RX ORDER — SODIUM CHLORIDE 9 MG/ML
20 INJECTION, SOLUTION INTRAVENOUS ONCE
Status: CANCELLED | OUTPATIENT
Start: 2023-11-10

## 2023-11-07 RX ORDER — ACETAMINOPHEN 325 MG/1
650 TABLET ORAL ONCE
Status: CANCELLED | OUTPATIENT
Start: 2023-11-10

## 2023-11-07 RX ORDER — DIPHENHYDRAMINE HCL 25 MG
25 TABLET ORAL ONCE
Status: CANCELLED | OUTPATIENT
Start: 2023-11-10

## 2023-11-10 ENCOUNTER — HOSPITAL ENCOUNTER (OUTPATIENT)
Dept: INFUSION CENTER | Facility: CLINIC | Age: 48
End: 2023-11-10
Payer: COMMERCIAL

## 2023-11-10 VITALS
DIASTOLIC BLOOD PRESSURE: 77 MMHG | TEMPERATURE: 97.4 F | RESPIRATION RATE: 18 BRPM | HEART RATE: 100 BPM | SYSTOLIC BLOOD PRESSURE: 111 MMHG | OXYGEN SATURATION: 98 %

## 2023-11-10 DIAGNOSIS — N05.0 MINIMAL CHANGE DISEASE: Primary | ICD-10-CM

## 2023-11-10 PROCEDURE — 96375 TX/PRO/DX INJ NEW DRUG ADDON: CPT

## 2023-11-10 PROCEDURE — 96415 CHEMO IV INFUSION ADDL HR: CPT

## 2023-11-10 PROCEDURE — 96413 CHEMO IV INFUSION 1 HR: CPT

## 2023-11-10 RX ORDER — METHYLPREDNISOLONE SODIUM SUCCINATE 125 MG/2ML
125 INJECTION, POWDER, LYOPHILIZED, FOR SOLUTION INTRAMUSCULAR; INTRAVENOUS ONCE
OUTPATIENT
Start: 2024-05-05

## 2023-11-10 RX ORDER — SODIUM CHLORIDE 9 MG/ML
20 INJECTION, SOLUTION INTRAVENOUS ONCE
Status: COMPLETED | OUTPATIENT
Start: 2023-11-10 | End: 2023-11-10

## 2023-11-10 RX ORDER — DIPHENHYDRAMINE HCL 25 MG
25 TABLET ORAL ONCE
Status: COMPLETED | OUTPATIENT
Start: 2023-11-10 | End: 2023-11-10

## 2023-11-10 RX ORDER — SODIUM CHLORIDE 9 MG/ML
20 INJECTION, SOLUTION INTRAVENOUS ONCE
OUTPATIENT
Start: 2024-05-05

## 2023-11-10 RX ORDER — DIPHENHYDRAMINE HCL 25 MG
25 TABLET ORAL ONCE
OUTPATIENT
Start: 2024-05-05

## 2023-11-10 RX ORDER — ACETAMINOPHEN 325 MG/1
650 TABLET ORAL ONCE
OUTPATIENT
Start: 2024-05-05

## 2023-11-10 RX ORDER — METHYLPREDNISOLONE SODIUM SUCCINATE 125 MG/2ML
125 INJECTION, POWDER, LYOPHILIZED, FOR SOLUTION INTRAMUSCULAR; INTRAVENOUS ONCE
Status: COMPLETED | OUTPATIENT
Start: 2023-11-10 | End: 2023-11-10

## 2023-11-10 RX ORDER — ACETAMINOPHEN 325 MG/1
650 TABLET ORAL ONCE
Status: COMPLETED | OUTPATIENT
Start: 2023-11-10 | End: 2023-11-10

## 2023-11-10 RX ADMIN — ACETAMINOPHEN 650 MG: 325 TABLET ORAL at 08:20

## 2023-11-10 RX ADMIN — DIPHENHYDRAMINE HYDROCHLORIDE 25 MG: 25 TABLET ORAL at 08:20

## 2023-11-10 RX ADMIN — RITUXIMAB 1000 MG: 10 INJECTION, SOLUTION INTRAVENOUS at 09:02

## 2023-11-10 RX ADMIN — SODIUM CHLORIDE 20 ML/HR: 0.9 INJECTION, SOLUTION INTRAVENOUS at 08:12

## 2023-11-10 RX ADMIN — METHYLPREDNISOLONE SODIUM SUCCINATE 125 MG: 125 INJECTION, POWDER, FOR SOLUTION INTRAMUSCULAR; INTRAVENOUS at 08:20

## 2023-11-10 NOTE — PATIENT INSTRUCTIONS
November 2023 Sunday Monday Tuesday Wednesday Thursday Friday Saturday                  1     2     3     4                5     6     7     8     9     10    INF THERAPY PLAN   8:00 AM   (360 min.)   [facility] INF [facility] [id number]            Cycle 1, Treatment 1    12     13     14     15     16     17     18                19     20     21     22     23     24     25                26     27     28     29     30                                 Treatment Details         11/10/2023 - Cycle 1, Treatment 1      Supportive Care: [facility] PROVIDER COMMUNICATION, RITUXIMAB FIRST TITRATED CHEMO INFUSION

## 2023-11-10 NOTE — PROGRESS NOTES
Patient tolerated treatment without incident and was discharged post, no c/o offered. Next dosing due in May, patient aware she will need to schedule this.
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
## 2023-12-29 DIAGNOSIS — N05.0 MINIMAL CHANGE DISEASE: Primary | ICD-10-CM

## 2023-12-30 LAB
ALBUMIN SERPL-MCNC: 4.1 G/DL (ref 3.6–5.1)
BUN SERPL-MCNC: 11 MG/DL (ref 7–25)
BUN/CREAT SERPL: NORMAL (CALC) (ref 6–22)
CALCIUM SERPL-MCNC: 9.1 MG/DL (ref 8.6–10.2)
CHLORIDE SERPL-SCNC: 104 MMOL/L (ref 98–110)
CO2 SERPL-SCNC: 27 MMOL/L (ref 20–32)
CREAT SERPL-MCNC: 0.57 MG/DL (ref 0.5–0.99)
CREAT UR-MCNC: 23 MG/DL (ref 20–275)
GFR/BSA.PRED SERPLBLD CYS-BASED-ARV: 113 ML/MIN/1.73M2
GLUCOSE SERPL-MCNC: 90 MG/DL (ref 65–99)
PHOSPHATE SERPL-MCNC: 3 MG/DL (ref 2.5–4.5)
POTASSIUM SERPL-SCNC: 4.3 MMOL/L (ref 3.5–5.3)
PROT UR-MCNC: <4 MG/DL (ref 5–24)
PROT/CREAT UR: ABNORMAL MG/G CREAT (ref 24–184)
PROT/CREAT UR: ABNORMAL MG/MG CREAT (ref 0.02–0.18)
SODIUM SERPL-SCNC: 137 MMOL/L (ref 135–146)

## 2024-01-02 ENCOUNTER — TELEPHONE (OUTPATIENT)
Dept: NEPHROLOGY | Facility: CLINIC | Age: 49
End: 2024-01-02

## 2024-01-02 NOTE — TELEPHONE ENCOUNTER
Pt advised that labs of 12/29/23 show stable kidney function and urine protein remains quite low per Dr. Mckeon.    ----- Message from Ady Mckeon MD sent at 1/2/2024 10:12 AM EST -----  Please inform patient that most recent labs (12/29/23) show that kidney function is stable and that protein in the urine remains quite low.

## 2024-01-03 RX ORDER — DIPHENHYDRAMINE HCL 25 MG
25 TABLET ORAL ONCE
OUTPATIENT
Start: 2024-04-15

## 2024-01-03 RX ORDER — SODIUM CHLORIDE 9 MG/ML
20 INJECTION, SOLUTION INTRAVENOUS ONCE
OUTPATIENT
Start: 2024-04-15

## 2024-01-03 RX ORDER — ACETAMINOPHEN 325 MG/1
650 TABLET ORAL ONCE
OUTPATIENT
Start: 2024-04-15

## 2024-01-03 NOTE — ADDENDUM NOTE
Addended by: SYD SLATER on: 1/3/2024 12:27 PM     Modules accepted: Orders    
Attending Attestation (For Attendings USE Only)...

## 2024-01-17 ENCOUNTER — HOSPITAL ENCOUNTER (OUTPATIENT)
Dept: RADIOLOGY | Age: 49
Discharge: HOME | End: 2024-01-17
Payer: COMMERCIAL

## 2024-01-17 VITALS — BODY MASS INDEX: 20.32 KG/M2 | WEIGHT: 119 LBS | HEIGHT: 64 IN

## 2024-01-17 DIAGNOSIS — Z12.31 ENCOUNTER FOR SCREENING MAMMOGRAM FOR MALIGNANT NEOPLASM OF BREAST: ICD-10-CM

## 2024-01-17 PROCEDURE — 77067 SCR MAMMO BI INCL CAD: CPT

## 2024-01-17 PROCEDURE — 77063 BREAST TOMOSYNTHESIS BI: CPT

## 2024-01-26 ENCOUNTER — RA CDI HCC (OUTPATIENT)
Dept: OTHER | Facility: HOSPITAL | Age: 49
End: 2024-01-26

## 2024-01-29 ENCOUNTER — OFFICE VISIT (OUTPATIENT)
Dept: INTERNAL MEDICINE CLINIC | Facility: CLINIC | Age: 49
End: 2024-01-29
Payer: COMMERCIAL

## 2024-01-29 VITALS
HEIGHT: 64 IN | HEART RATE: 94 BPM | OXYGEN SATURATION: 99 % | DIASTOLIC BLOOD PRESSURE: 70 MMHG | BODY MASS INDEX: 21.72 KG/M2 | RESPIRATION RATE: 16 BRPM | WEIGHT: 127.2 LBS | SYSTOLIC BLOOD PRESSURE: 118 MMHG

## 2024-01-29 DIAGNOSIS — D17.0 LIPOMA OF HEAD: ICD-10-CM

## 2024-01-29 DIAGNOSIS — E55.9 VITAMIN D DEFICIENCY: ICD-10-CM

## 2024-01-29 DIAGNOSIS — Z13.220 SCREENING, LIPID: ICD-10-CM

## 2024-01-29 DIAGNOSIS — Z00.00 ANNUAL PHYSICAL EXAM: Primary | ICD-10-CM

## 2024-01-29 DIAGNOSIS — Z12.11 SCREEN FOR COLON CANCER: ICD-10-CM

## 2024-01-29 PROBLEM — S06.0X9A CONCUSSION WITH LOSS OF CONSCIOUSNESS: Status: RESOLVED | Noted: 2023-10-06 | Resolved: 2024-01-29

## 2024-01-29 PROBLEM — L73.9 FOLLICULITIS: Status: RESOLVED | Noted: 2021-06-09 | Resolved: 2024-01-29

## 2024-01-29 PROCEDURE — 99396 PREV VISIT EST AGE 40-64: CPT | Performed by: INTERNAL MEDICINE

## 2024-01-29 RX ORDER — GINGER ROOT/GINGER ROOT EXT 262.5 MG
1 CAPSULE ORAL DAILY
Start: 2024-01-29

## 2024-01-29 NOTE — PROGRESS NOTES
ADULT ANNUAL PHYSICAL  [facility]    NAME: Bessy Silva  AGE: 49 y.o. SEX: female  : 1975     DATE: 2024     Assessment and Plan:     Problem List Items Addressed This Visit    None  Visit Diagnoses     Annual physical exam    -  Primary    Screen for colon cancer        Relevant Orders    Ambulatory Referral to Gastroenterology    Vitamin D deficiency        Relevant Medications    Calcium Carb-Cholecalciferol (Calcium+D3) 600-20 MG-MCG TABS    Lipoma of head        Relevant Orders    Ambulatory Referral to Plastic Surgery    Screening, lipid        Relevant Orders    Lipid panel            Immunizations and preventive care screenings were discussed with patient today. Appropriate education was printed on patient's after visit summary.  Advised that she can get the COVID vaccine anytime    Counseling:  Continue healthy diet and regular exercise         Return in about 1 year (around 2025) for Annual physical.     Chief Complaint:     Chief Complaint   Patient presents with   • Annual Exam      History of Present Illness:     Adult Annual Physical   Patient here for a comprehensive physical exam. The patient reports no problems.    Diet and Physical Activity  Diet/Nutrition: well balanced diet.   Exercise: 5-7 times a week on average.      Depression Screening  PHQ-2/9 Depression Screening    Little interest or pleasure in doing things: 0 - not at all  Feeling down, depressed, or hopeless: 0 - not at all  PHQ-2 Score: 0  PHQ-2 Interpretation: Negative depression screen       General Health  Sleep: sleeps well.   Hearing: normal - bilateral.  Vision: goes for regular eye exams.   Dental: regular dental visits.       /GYN Health  Follows with gynecology? yes   Patient is: premenopausal     Review of Systems:     Review of Systems   Constitutional:  Negative for chills, fever and unexpected weight change.   Respiratory:  Negative  for shortness of breath.    Cardiovascular:  Negative for chest pain and palpitations.   Gastrointestinal:  Negative for abdominal pain, constipation and diarrhea.   Skin:         Lump right forehead for years that changes in size   Neurological:  Negative for dizziness and headaches.      Past Medical History:     Past Medical History:   Diagnosis Date   • Concussion with loss of consciousness    • Fibroid    • Folliculitis    • Hyperlipidemia    • Nephrotic syndrome       Past Surgical History:     Past Surgical History:   Procedure Laterality Date   • IR BIOPSY KIDNEY MASS  9/25/2019   • TUBAL LIGATION     • WISDOM TOOTH EXTRACTION        Social History:     Social History     Socioeconomic History   • Marital status: Single     Spouse name: None   • Number of children: None   • Years of education: None   • Highest education level: None   Occupational History   • None   Tobacco Use   • Smoking status: Every Day     Current packs/day: 0.25     Average packs/day: 0.3 packs/day for 25.0 years (6.3 ttl pk-yrs)     Types: Cigarettes   • Smokeless tobacco: Never   • Tobacco comments:     3 cigarettes a day   Vaping Use   • Vaping status: Never Used   Substance and Sexual Activity   • Alcohol use: Yes     Comment: occasionally   • Drug use: Never   • Sexual activity: Yes     Partners: Male     Birth control/protection: Surgical   Other Topics Concern   • None   Social History Narrative   • None     Social Determinants of Health     Financial Resource Strain: Not on file   Food Insecurity: Not on file   Transportation Needs: Not on file   Physical Activity: Not on file   Stress: Not on file   Social Connections: Not on file   Intimate Partner Violence: Not on file   Housing Stability: Not on file      Family History:     Family History   Problem Relation Age of Onset   • No Known Problems Mother    • Heart failure Father    • No Known Problems Sister    • No Known Problems Daughter    • Diabetes Paternal Grandmother    •  "No Known Problems Brother    • No Known Problems Brother    • No Known Problems Son    • No Known Problems Son    • No Known Problems Maternal Aunt    • No Known Problems Maternal Aunt    • No Known Problems Paternal Aunt       Current Medications:     Current Outpatient Medications   Medication Sig Dispense Refill   • Ascorbic Acid (vitamin C) 1000 MG tablet Take 1,000 mg by mouth daily     • Calcium Carb-Cholecalciferol (Calcium+D3) 600-20 MG-MCG TABS Take 1 tablet by mouth in the morning     • Multiple Vitamin (MULTIVITAMIN ADULT PO) Take 1 tablet by mouth in the morning     • Nerve Stimulator (TENS Therapy Pain Relief) EDGAR Use 1 application 2 (two) times a day as needed (For left lumbar radiculopathy) 1 each 0     No current facility-administered medications for this visit.      Allergies:     No Known Allergies   Physical Exam:     /70   Pulse 94   Resp 16   Ht 5' 4\" (1.626 m)   Wt 57.7 kg (127 lb 3.2 oz)   LMP 01/03/2024 (Approximate)   SpO2 99%   BMI 21.83 kg/m²     Physical Exam  Constitutional:       General: She is not in acute distress.     Appearance: She is well-developed. She is not ill-appearing, toxic-appearing or diaphoretic.   HENT:      Head: Mass (soft movable mass on the right forehead) present.      Right Ear: External ear normal. There is no impacted cerumen.      Left Ear: External ear normal. There is no impacted cerumen.   Eyes:      Conjunctiva/sclera: Conjunctivae normal.   Cardiovascular:      Rate and Rhythm: Normal rate and regular rhythm.      Heart sounds: Normal heart sounds. No murmur heard.  Pulmonary:      Effort: Pulmonary effort is normal. No respiratory distress.      Breath sounds: Normal breath sounds. No stridor. No wheezing or rales.   Abdominal:      General: There is no distension.      Palpations: Abdomen is soft. There is no mass.      Tenderness: There is no abdominal tenderness. There is no guarding or rebound.   Musculoskeletal:      Cervical back: " Neck supple.      Right lower leg: No edema.      Left lower leg: No edema.   Neurological:      Mental Status: She is alert and oriented to person, place, and time.   Psychiatric:         Mood and Affect: Mood normal.         Behavior: Behavior normal.         Thought Content: Thought content normal.         Judgment: Judgment normal.          Lea Reyes, MD  [facility]

## 2024-03-02 LAB
ALBUMIN SERPL-MCNC: 4.2 G/DL (ref 3.6–5.1)
ALBUMIN/GLOB SERPL: 1.8 (CALC) (ref 1–2.5)
ALP SERPL-CCNC: 35 U/L (ref 31–125)
ALT SERPL-CCNC: 17 U/L (ref 6–29)
APPEARANCE UR: CLEAR
AST SERPL-CCNC: 27 U/L (ref 10–35)
BACTERIA UR QL AUTO: NORMAL /HPF
BASOPHILS # BLD AUTO: 88 CELLS/UL (ref 0–200)
BASOPHILS NFR BLD AUTO: 1.3 %
BILIRUB SERPL-MCNC: 0.8 MG/DL (ref 0.2–1.2)
BILIRUB UR QL STRIP: NEGATIVE
BUN SERPL-MCNC: 13 MG/DL (ref 7–25)
BUN/CREAT SERPL: NORMAL (CALC) (ref 6–22)
CALCIUM SERPL-MCNC: 9.1 MG/DL (ref 8.6–10.2)
CHLORIDE SERPL-SCNC: 103 MMOL/L (ref 98–110)
CHOLEST SERPL-MCNC: 164 MG/DL
CHOLEST/HDLC SERPL: 2.3 (CALC)
CO2 SERPL-SCNC: 26 MMOL/L (ref 20–32)
COLOR UR: YELLOW
CREAT SERPL-MCNC: 0.6 MG/DL (ref 0.5–0.99)
CREAT UR-MCNC: 10 MG/DL (ref 20–275)
EOSINOPHIL # BLD AUTO: 483 CELLS/UL (ref 15–500)
EOSINOPHIL NFR BLD AUTO: 7.1 %
ERYTHROCYTE [DISTWIDTH] IN BLOOD BY AUTOMATED COUNT: 11.5 % (ref 11–15)
GFR/BSA.PRED SERPLBLD CYS-BASED-ARV: 111 ML/MIN/1.73M2
GLOBULIN SER CALC-MCNC: 2.4 G/DL (CALC) (ref 1.9–3.7)
GLUCOSE SERPL-MCNC: 76 MG/DL (ref 65–99)
GLUCOSE UR QL STRIP: NEGATIVE
HCT VFR BLD AUTO: 34.7 % (ref 35–45)
HDLC SERPL-MCNC: 72 MG/DL
HGB BLD-MCNC: 11.5 G/DL (ref 11.7–15.5)
HGB UR QL STRIP: NEGATIVE
HYALINE CASTS #/AREA URNS LPF: NORMAL /LPF
KETONES UR QL STRIP: NEGATIVE
LDLC SERPL CALC-MCNC: 77 MG/DL (CALC)
LEUKOCYTE ESTERASE UR QL STRIP: NEGATIVE
LYMPHOCYTES # BLD AUTO: 1979 CELLS/UL (ref 850–3900)
LYMPHOCYTES NFR BLD AUTO: 29.1 %
MCH RBC QN AUTO: 29.6 PG (ref 27–33)
MCHC RBC AUTO-ENTMCNC: 33.1 G/DL (ref 32–36)
MCV RBC AUTO: 89.2 FL (ref 80–100)
MONOCYTES # BLD AUTO: 592 CELLS/UL (ref 200–950)
MONOCYTES NFR BLD AUTO: 8.7 %
NEUTROPHILS # BLD AUTO: 3658 CELLS/UL (ref 1500–7800)
NEUTROPHILS NFR BLD AUTO: 53.8 %
NITRITE UR QL STRIP: NEGATIVE
NONHDLC SERPL-MCNC: 92 MG/DL (CALC)
PH UR STRIP: 7 [PH] (ref 5–8)
PLATELET # BLD AUTO: 310 THOUSAND/UL (ref 140–400)
PMV BLD REES-ECKER: 9.5 FL (ref 7.5–12.5)
POTASSIUM SERPL-SCNC: 3.8 MMOL/L (ref 3.5–5.3)
PROT SERPL-MCNC: 6.6 G/DL (ref 6.1–8.1)
PROT UR QL STRIP: NEGATIVE
PROT UR-MCNC: <4 MG/DL (ref 5–24)
PROT/CREAT UR: ABNORMAL MG/G CREAT (ref 24–184)
PROT/CREAT UR: ABNORMAL MG/MG CREAT (ref 0.02–0.18)
RBC # BLD AUTO: 3.89 MILLION/UL (ref 3.8–5.1)
RBC #/AREA URNS HPF: NORMAL /HPF
SODIUM SERPL-SCNC: 138 MMOL/L (ref 135–146)
SP GR UR STRIP: 1 (ref 1–1.03)
SQUAMOUS #/AREA URNS HPF: NORMAL /HPF
TRIGL SERPL-MCNC: 66 MG/DL
WBC # BLD AUTO: 6.8 THOUSAND/UL (ref 3.8–10.8)
WBC #/AREA URNS HPF: NORMAL /HPF

## 2024-03-07 ENCOUNTER — OFFICE VISIT (OUTPATIENT)
Dept: NEPHROLOGY | Facility: CLINIC | Age: 49
End: 2024-03-07
Payer: COMMERCIAL

## 2024-03-07 VITALS
OXYGEN SATURATION: 99 % | HEART RATE: 90 BPM | SYSTOLIC BLOOD PRESSURE: 104 MMHG | HEIGHT: 64 IN | WEIGHT: 124 LBS | DIASTOLIC BLOOD PRESSURE: 72 MMHG | BODY MASS INDEX: 21.17 KG/M2

## 2024-03-07 DIAGNOSIS — N05.0 MINIMAL CHANGE DISEASE: Primary | ICD-10-CM

## 2024-03-07 DIAGNOSIS — N04.0 NEPHROTIC SYNDROME WITH LESION OF MINIMAL CHANGE GLOMERULONEPHRITIS: ICD-10-CM

## 2024-03-07 PROCEDURE — 99214 OFFICE O/P EST MOD 30 MIN: CPT | Performed by: INTERNAL MEDICINE

## 2024-03-07 NOTE — PATIENT INSTRUCTIONS
Your labs look good.   There has been no recurrence of the nephrotic syndrome.  We will plan for rituximab in May 2024.  Check blood work in September 2024.  Follow-up in 6 months.
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
## 2024-03-14 ENCOUNTER — ANNUAL EXAM (OUTPATIENT)
Dept: OBGYN CLINIC | Facility: CLINIC | Age: 49
End: 2024-03-14
Payer: COMMERCIAL

## 2024-03-14 VITALS
SYSTOLIC BLOOD PRESSURE: 108 MMHG | DIASTOLIC BLOOD PRESSURE: 70 MMHG | WEIGHT: 120 LBS | BODY MASS INDEX: 20.49 KG/M2 | HEIGHT: 64 IN

## 2024-03-14 DIAGNOSIS — Z12.11 SCREENING FOR COLON CANCER: ICD-10-CM

## 2024-03-14 DIAGNOSIS — Z12.4 SCREENING FOR MALIGNANT NEOPLASM OF CERVIX: ICD-10-CM

## 2024-03-14 DIAGNOSIS — N76.4 VULVAR ABSCESS: ICD-10-CM

## 2024-03-14 DIAGNOSIS — Z01.419 WELL WOMAN EXAM WITH ROUTINE GYNECOLOGICAL EXAM: Primary | ICD-10-CM

## 2024-03-14 DIAGNOSIS — Z11.51 SCREENING FOR HUMAN PAPILLOMAVIRUS (HPV): ICD-10-CM

## 2024-03-14 DIAGNOSIS — Z12.31 ENCOUNTER FOR SCREENING MAMMOGRAM FOR MALIGNANT NEOPLASM OF BREAST: ICD-10-CM

## 2024-03-14 PROCEDURE — G0145 SCR C/V CYTO,THINLAYER,RESCR: HCPCS | Performed by: PHYSICIAN ASSISTANT

## 2024-03-14 PROCEDURE — G0476 HPV COMBO ASSAY CA SCREEN: HCPCS | Performed by: PHYSICIAN ASSISTANT

## 2024-03-14 PROCEDURE — S0612 ANNUAL GYNECOLOGICAL EXAMINA: HCPCS | Performed by: PHYSICIAN ASSISTANT

## 2024-03-14 PROCEDURE — S0610 ANNUAL GYNECOLOGICAL EXAMINA: HCPCS | Performed by: PHYSICIAN ASSISTANT

## 2024-03-14 RX ORDER — CEPHALEXIN 500 MG/1
500 CAPSULE ORAL EVERY 12 HOURS SCHEDULED
Qty: 14 CAPSULE | Refills: 0 | Status: SHIPPED | OUTPATIENT
Start: 2024-03-14 | End: 2024-03-21

## 2024-03-14 NOTE — PROGRESS NOTES
ASSESSMENT & PLAN:   Diagnoses and all orders for this visit:    Well woman exam with routine gynecological exam  -     Liquid-based pap, screening    Encounter for screening mammogram for malignant neoplasm of breast  -     Mammo screening bilateral w 3d & cad; Future    Screening for colon cancer  -     Ambulatory referral to Gastroenterology; Future    Screening for malignant neoplasm of cervix  -     Liquid-based pap, screening    Screening for human papillomavirus (HPV)  -     Liquid-based pap, screening    Vulvar abscess  -     cephalexin (KEFLEX) 500 mg capsule; Take 1 capsule (500 mg total) by mouth every 12 (twelve) hours for 7 days          The following were reviewed in today's visit: ASCCP guidelines, Gardisil vaccination, STD testing breast self exam, mammography screening ordered, STD testing, menopause, exercise, healthy diet, and colonoscopy discussed and ordered.    Patient to return to office in yearly for annual exam.     All questions have been answered to her satisfaction.        CC:  Annual Gynecologic Examination  Chief Complaint   Patient presents with    Gynecologic Exam     Patient is here today for her yearly exam, pap due today(10/12/21-wnl), mammo 24-order placed, colonoscopy-no history-order placed. Patient is doing well, she has no concerns at this time. Cycles are starting to become irregular.        HPI: Bessy Silva is a 49 y.o.  who presents for annual gynecologic examination.  She has the following concerns:  cycles becoming irregular. Patient also reports an ingrown hair/sebaceous cyst that keeps reoccurring on her mons. It is healing but is still firm and tender.       Health Maintenance:    Exercise: frequently  Breast exams/breast awareness: yes  Last mammogram:   Colorectal cancer screening: referral placed    Past Medical History:   Diagnosis Date    Concussion with loss of consciousness     Fibroid     Folliculitis     Hyperlipidemia     Nephrotic  syndrome        Past Surgical History:   Procedure Laterality Date    IR BIOPSY KIDNEY MASS  9/25/2019    TUBAL LIGATION      WISDOM TOOTH EXTRACTION         Past OB/Gyn History:   Patient's last menstrual period was 02/24/2024 (exact date).    Menopausal status: premenopausal  Menopausal symptoms: cycles becoming irregular    Last Pap: 2021 : no abnormalities  History of abnormal Pap smear: no    Patient is currently sexually active.   STD testing: no  Current contraception: tubal ligation      Family History  Family History   Problem Relation Age of Onset    No Known Problems Mother     Heart failure Father     No Known Problems Sister     No Known Problems Daughter     Diabetes Paternal Grandmother     No Known Problems Brother     No Known Problems Brother     No Known Problems Son     No Known Problems Son     No Known Problems Maternal Aunt     No Known Problems Maternal Aunt     No Known Problems Paternal Aunt        Family history of uterine or ovarian cancer: no  Family history of breast cancer: no  Family history of colon cancer: no    Social History:  Social History     Socioeconomic History    Marital status: Single     Spouse name: Not on file    Number of children: Not on file    Years of education: Not on file    Highest education level: Not on file   Occupational History    Not on file   Tobacco Use    Smoking status: Former     Current packs/day: 0.25     Average packs/day: 0.3 packs/day for 25.2 years (6.3 ttl pk-yrs)     Types: Cigarettes    Smokeless tobacco: Never    Tobacco comments:     3 cigarettes a day   Vaping Use    Vaping status: Never Used   Substance and Sexual Activity    Alcohol use: Not Currently     Comment: occasionally    Drug use: Never    Sexual activity: Yes     Partners: Male     Birth control/protection: Surgical   Other Topics Concern    Not on file   Social History Narrative    Not on file     Social Determinants of Health     Financial Resource Strain: Not on file   Food  "Insecurity: Not on file   Transportation Needs: Not on file   Physical Activity: Not on file   Stress: Not on file   Social Connections: Not on file   Intimate Partner Violence: Not on file   Housing Stability: Not on file     Domestic violence screen: negative    Allergies:  No Known Allergies    Medications:    Current Outpatient Medications:     Ascorbic Acid (vitamin C) 1000 MG tablet, Take 1,000 mg by mouth daily, Disp: , Rfl:     Calcium Carb-Cholecalciferol (Calcium+D3) 600-20 MG-MCG TABS, Take 1 tablet by mouth in the morning, Disp: , Rfl:     cephalexin (KEFLEX) 500 mg capsule, Take 1 capsule (500 mg total) by mouth every 12 (twelve) hours for 7 days, Disp: 14 capsule, Rfl: 0    Multiple Vitamin (MULTIVITAMIN ADULT PO), Take 1 tablet by mouth in the morning, Disp: , Rfl:     Nerve Stimulator (TENS Therapy Pain Relief) EDGAR, Use 1 application 2 (two) times a day as needed (For left lumbar radiculopathy), Disp: 1 each, Rfl: 0    Review of Systems:  Review of Systems   Constitutional:  Negative for chills, fever and unexpected weight change.   Respiratory:  Negative for shortness of breath.    Cardiovascular:  Negative for chest pain.   Gastrointestinal:  Negative for abdominal pain, diarrhea, nausea and vomiting.   Skin:  Negative for rash.   Psychiatric/Behavioral:  Negative for dysphoric mood. The patient is not nervous/anxious.          Physical Exam:  /70 (BP Location: Right arm, Patient Position: Sitting, Cuff Size: Standard)   Ht 5' 4\" (1.626 m)   Wt 54.4 kg (120 lb)   LMP 02/24/2024 (Exact Date)   BMI 20.60 kg/m²    Physical Exam  Constitutional:       Appearance: Normal appearance.   Genitourinary:      Vulva and urethral meatus normal.      No lesions in the vagina.      Right Labia: No rash or lesions.     Left Labia: No lesions or rash.     No vaginal discharge, erythema or bleeding.        Right Adnexa: not tender and no mass present.     Left Adnexa: not tender and no mass present.     " No cervical discharge or lesion.      Uterus is not tender.   Breasts:     Breasts are symmetrical.      Right: No mass or skin change.      Left: No mass or skin change.   HENT:      Head: Normocephalic and atraumatic.   Cardiovascular:      Rate and Rhythm: Normal rate and regular rhythm.      Heart sounds: Normal heart sounds. No murmur heard.     No friction rub. No gallop.   Pulmonary:      Effort: Pulmonary effort is normal.      Breath sounds: Normal breath sounds. No wheezing, rhonchi or rales.   Abdominal:      General: Abdomen is flat. There is no distension.      Palpations: Abdomen is soft.      Tenderness: There is no abdominal tenderness.       Musculoskeletal:      Cervical back: Neck supple.   Lymphadenopathy:      Upper Body:      Right upper body: No axillary adenopathy.      Left upper body: No axillary adenopathy.   Neurological:      General: No focal deficit present.      Mental Status: She is alert.   Skin:     General: Skin is warm and dry.   Psychiatric:         Mood and Affect: Mood normal.         Behavior: Behavior normal.   Vitals reviewed.

## 2024-03-20 DIAGNOSIS — N05.0 MINIMAL CHANGE DISEASE: Primary | ICD-10-CM

## 2024-03-20 RX ORDER — ACETAMINOPHEN 325 MG/1
650 TABLET ORAL ONCE
OUTPATIENT
Start: 2024-05-15

## 2024-03-20 RX ORDER — SODIUM CHLORIDE 9 MG/ML
20 INJECTION, SOLUTION INTRAVENOUS ONCE
OUTPATIENT
Start: 2024-05-15

## 2024-03-20 RX ORDER — DIPHENHYDRAMINE HCL 25 MG
25 TABLET ORAL ONCE
OUTPATIENT
Start: 2024-05-15

## 2024-03-20 NOTE — PROGRESS NOTES
Rituximab infusion scheduled for  5/15/24 at [facility] 7:30 AM  Prior auth valid until 11/10/24.

## 2024-03-22 DIAGNOSIS — N76.0 BACTERIAL VAGINOSIS: Primary | ICD-10-CM

## 2024-03-22 DIAGNOSIS — B96.89 BACTERIAL VAGINOSIS: Primary | ICD-10-CM

## 2024-03-22 LAB
LAB AP GYN PRIMARY INTERPRETATION: NORMAL
Lab: NORMAL
PATH INTERP SPEC-IMP: NORMAL

## 2024-03-22 RX ORDER — METRONIDAZOLE 7.5 MG/G
1 GEL VAGINAL
Qty: 5 G | Refills: 0 | Status: SHIPPED | OUTPATIENT
Start: 2024-03-22 | End: 2024-03-27

## 2024-05-15 ENCOUNTER — HOSPITAL ENCOUNTER (OUTPATIENT)
Dept: INFUSION CENTER | Facility: CLINIC | Age: 49
Discharge: HOME | End: 2024-05-15
Payer: COMMERCIAL

## 2024-05-15 VITALS
HEART RATE: 96 BPM | TEMPERATURE: 98.2 F | DIASTOLIC BLOOD PRESSURE: 84 MMHG | SYSTOLIC BLOOD PRESSURE: 126 MMHG | RESPIRATION RATE: 18 BRPM | OXYGEN SATURATION: 98 %

## 2024-05-15 DIAGNOSIS — N05.0 MINIMAL CHANGE DISEASE: Primary | ICD-10-CM

## 2024-05-15 PROCEDURE — 96415 CHEMO IV INFUSION ADDL HR: CPT

## 2024-05-15 PROCEDURE — 96413 CHEMO IV INFUSION 1 HR: CPT

## 2024-05-15 RX ORDER — DIPHENHYDRAMINE HCL 25 MG
25 TABLET ORAL ONCE
OUTPATIENT
Start: 2024-11-15

## 2024-05-15 RX ORDER — ACETAMINOPHEN 325 MG/1
650 TABLET ORAL ONCE
OUTPATIENT
Start: 2024-11-15

## 2024-05-15 RX ORDER — SODIUM CHLORIDE 9 MG/ML
20 INJECTION, SOLUTION INTRAVENOUS ONCE
Status: COMPLETED | OUTPATIENT
Start: 2024-05-15 | End: 2024-05-15

## 2024-05-15 RX ORDER — SODIUM CHLORIDE 9 MG/ML
20 INJECTION, SOLUTION INTRAVENOUS ONCE
OUTPATIENT
Start: 2024-11-15

## 2024-05-15 RX ORDER — DIPHENHYDRAMINE HCL 25 MG
25 TABLET ORAL ONCE
Status: COMPLETED | OUTPATIENT
Start: 2024-05-15 | End: 2024-05-15

## 2024-05-15 RX ORDER — ACETAMINOPHEN 325 MG/1
650 TABLET ORAL ONCE
Status: COMPLETED | OUTPATIENT
Start: 2024-05-15 | End: 2024-05-15

## 2024-05-15 RX ADMIN — SODIUM CHLORIDE 1000 MG: 0.9 INJECTION, SOLUTION INTRAVENOUS at 08:46

## 2024-05-15 RX ADMIN — ACETAMINOPHEN 650 MG: 325 TABLET ORAL at 07:55

## 2024-05-15 RX ADMIN — SODIUM CHLORIDE 20 ML/HR: 0.9 INJECTION, SOLUTION INTRAVENOUS at 07:55

## 2024-05-15 RX ADMIN — DIPHENHYDRAMINE HYDROCHLORIDE 25 MG: 25 TABLET ORAL at 07:55

## 2024-05-15 NOTE — PROGRESS NOTES
Pt to clinic for ruxience infusion. Pt denies any recent illness or antibiotic use. Call bell in reach

## 2024-06-13 ENCOUNTER — VBI (OUTPATIENT)
Dept: ADMINISTRATIVE | Facility: OTHER | Age: 49
End: 2024-06-13

## 2024-06-13 NOTE — TELEPHONE ENCOUNTER
06/13/24 8:32 AM     Chart reviewed for CRC: Colonoscopy was/were not submitted to the patient's insurance.     CATHERINE BURROUGHS   [facility]

## 2024-10-11 LAB
ALBUMIN SERPL-MCNC: 4.5 G/DL (ref 3.6–5.1)
ALBUMIN/GLOB SERPL: 2 (CALC) (ref 1–2.5)
ALP SERPL-CCNC: 30 U/L (ref 31–125)
ALT SERPL-CCNC: 10 U/L (ref 6–29)
APPEARANCE UR: CLEAR
AST SERPL-CCNC: 14 U/L (ref 10–35)
BASOPHILS # BLD AUTO: 92 CELLS/UL (ref 0–200)
BASOPHILS NFR BLD AUTO: 0.9 %
BILIRUB SERPL-MCNC: 1.1 MG/DL (ref 0.2–1.2)
BILIRUB UR QL STRIP: NEGATIVE
BUN SERPL-MCNC: 15 MG/DL (ref 7–25)
BUN/CREAT SERPL: ABNORMAL (CALC) (ref 6–22)
CALCIUM SERPL-MCNC: 9.2 MG/DL (ref 8.6–10.2)
CD19 CELLS # BLD: <20 CELLS/UL (ref 110–660)
CD19 CELLS NFR BLD: 0 % (ref 6–29)
CD3 CELLS # BLD: 1338 CELLS/UL (ref 840–3060)
CD3 CELLS NFR BLD: 84 % (ref 57–85)
CD3+CD4+ CELLS # BLD: 1042 CELLS/UL (ref 490–1740)
CD3+CD4+ CELLS NFR BLD: 66 % (ref 30–61)
CD3+CD4+ CELLS/CD3+CD8+ CLL BLD: 3.92 % (ref 0.86–5)
CD3+CD8+ CELLS # BLD: 265 CELLS/UL (ref 180–1170)
CD3+CD8+ CELLS NFR BLD: 17 % (ref 12–42)
CHLORIDE SERPL-SCNC: 104 MMOL/L (ref 98–110)
CO2 SERPL-SCNC: 27 MMOL/L (ref 20–32)
COLOR UR: YELLOW
CREAT SERPL-MCNC: 0.64 MG/DL (ref 0.5–0.99)
CREAT UR-MCNC: 53 MG/DL (ref 20–275)
EOSINOPHIL # BLD AUTO: 245 CELLS/UL (ref 15–500)
EOSINOPHIL NFR BLD AUTO: 2.4 %
ERYTHROCYTE [DISTWIDTH] IN BLOOD BY AUTOMATED COUNT: 11.7 % (ref 11–15)
GFR/BSA.PRED SERPLBLD CYS-BASED-ARV: 110 ML/MIN/1.73M2
GLOBULIN SER CALC-MCNC: 2.3 G/DL (CALC) (ref 1.9–3.7)
GLUCOSE SERPL-MCNC: 85 MG/DL (ref 65–99)
GLUCOSE UR QL STRIP: NEGATIVE
HCT VFR BLD AUTO: 37.5 % (ref 35–45)
HGB BLD-MCNC: 12.5 G/DL (ref 11.7–15.5)
HGB UR QL STRIP: NEGATIVE
KETONES UR QL STRIP: NEGATIVE
LEUKOCYTE ESTERASE UR QL STRIP: NEGATIVE
LYMPHOCYTES # BLD AUTO: 1540 CELLS/UL (ref 850–3900)
LYMPHOCYTES # BLD AUTO: 1584 CELLS/UL (ref 850–3900)
LYMPHOCYTES NFR BLD AUTO: 15.1 %
MAGNESIUM SERPL-MCNC: 1.9 MG/DL (ref 1.5–2.5)
MCH RBC QN AUTO: 29.9 PG (ref 27–33)
MCHC RBC AUTO-ENTMCNC: 33.3 G/DL (ref 32–36)
MCV RBC AUTO: 89.7 FL (ref 80–100)
MONOCYTES # BLD AUTO: 765 CELLS/UL (ref 200–950)
MONOCYTES NFR BLD AUTO: 7.5 %
NEUTROPHILS # BLD AUTO: 7558 CELLS/UL (ref 1500–7800)
NEUTROPHILS NFR BLD AUTO: 74.1 %
NITRITE UR QL STRIP: NEGATIVE
PH UR STRIP: 6 [PH] (ref 5–8)
PHOSPHATE SERPL-MCNC: 3.2 MG/DL (ref 2.5–4.5)
PLATELET # BLD AUTO: 341 THOUSAND/UL (ref 140–400)
PMV BLD REES-ECKER: 9.4 FL (ref 7.5–12.5)
POTASSIUM SERPL-SCNC: 4.4 MMOL/L (ref 3.5–5.3)
PROT SERPL-MCNC: 6.8 G/DL (ref 6.1–8.1)
PROT UR QL STRIP: NEGATIVE
PROT UR-MCNC: <4 MG/DL (ref 5–24)
PROT/CREAT UR: ABNORMAL MG/G CREAT (ref 24–184)
PROT/CREAT UR: ABNORMAL MG/MG CREAT (ref 0.02–0.18)
RBC # BLD AUTO: 4.18 MILLION/UL (ref 3.8–5.1)
SODIUM SERPL-SCNC: 137 MMOL/L (ref 135–146)
SP GR UR STRIP: 1.01 (ref 1–1.03)
WBC # BLD AUTO: 10.2 THOUSAND/UL (ref 3.8–10.8)

## 2024-10-24 ENCOUNTER — OFFICE VISIT (OUTPATIENT)
Dept: NEPHROLOGY | Facility: CLINIC | Age: 49
End: 2024-10-24
Payer: COMMERCIAL

## 2024-10-24 VITALS
BODY MASS INDEX: 21.27 KG/M2 | DIASTOLIC BLOOD PRESSURE: 76 MMHG | HEIGHT: 64 IN | OXYGEN SATURATION: 100 % | WEIGHT: 124.6 LBS | SYSTOLIC BLOOD PRESSURE: 110 MMHG | HEART RATE: 99 BPM

## 2024-10-24 DIAGNOSIS — N04.0 NEPHROTIC SYNDROME WITH LESION OF MINIMAL CHANGE GLOMERULONEPHRITIS: ICD-10-CM

## 2024-10-24 DIAGNOSIS — N05.0 MINIMAL CHANGE DISEASE: Primary | ICD-10-CM

## 2024-10-24 PROBLEM — R80.9 PROTEINURIA: Status: RESOLVED | Noted: 2019-09-22 | Resolved: 2024-10-24

## 2024-10-24 PROCEDURE — 99214 OFFICE O/P EST MOD 30 MIN: CPT | Performed by: INTERNAL MEDICINE

## 2024-10-24 NOTE — PROGRESS NOTES
NEPHROLOGY OFFICE PROGRESS NOTE   Bessy Silva 49 y.o. female MRN: [id number]  DATE: 10/24/24  Reason for visit: Continued evaluation and management of SHANTEL    Assessment & Plan  Minimal change disease  Summary: Diagnosed in Sep 2019 and responded well to Prednisone - Was treated with Prednisone from 10/1/19 to 4/4/20. Had relapse of SHANTEL in May 2020 and restarted Prednisone on 5/18/20. Unfortunately, she had a relapse in Dec 2020 while Prednisone was being titrated down. Prednisone was increased and Tacrolimus was added on 2/3/21. Prednisone has been weaned off since June 2021. Remission was maintained with Tacrolimus but would have increase in proteinuria when Tacrolimus is weaned. Started Rituximab 1000 mg x 2 doses in April 24, 2023 and May 5, 2023. Tacrolimus weaned off since October 2023.   Last dose of Rituximab was in May 2024.   Currently, she has maintained remission.   Would continue Rituximab 1000 mg IV every 6 months for now.   I will arrange for Rituximab in Nov 2024.   Check T&B lymphocytes before next scheduled dose in May 2025. We may consider delaying the May 2025 dose.     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  Albumin is normal at 4.5.  UPC ratio is undetectable.       Patient Instructions   Your blood and urine tests look great and there is no evidence of the minimal change disease coming back.   Please continue with the Rituximab as scheduled in Nov 2024.   If your blood work appears favorable in May 2025, we may delay that dose for 2 months.  Follow-up in 6 months.    SUBJECTIVE / INTERVAL HISTORY:  Naina was last seen by me in the office in May 2024.   Since that time, she  "has been doing fairly well.  There have been no recent hospitalizations or ER visits.  She denies any acute complaints.  No new medical issues.    The following laboratory values were discussed with the patient today:  Creatinine 0.64  UPC ratio undetectable.   Albumin 4.5    Immunosuppression history:  April 24, 2023: Rituximab 1000 mg  May 5, 2023: Rituximab 1000 mg  November 10, 2023: Rituximab 1000 mg  May 15, 2024: Rituximab 1000 mg    PMH/PSH: SHANTEL.      Previous work up:   September 25, 2019 Renal biopsy:  Minimal change disease  Arteriosclerosis, mild to moderate.     9/23/19: ZAYNAB negative, KL ratio 0.88, UPEP No M spike, SPEP No M spike, Hep B and C NR, C3 127, C4 30, ANCA negative, anti PLA2R Ab negative     9/23/19 Renal US: R 11.8 cm, L 11.4 cm, no hydronephrosis.     ALLERGIES: No Known Allergies    REVIEW OF SYSTEMS:  Review of Systems   Constitutional:  Negative for appetite change, chills, fatigue, fever and unexpected weight change.   Respiratory:  Negative for cough and shortness of breath.    Cardiovascular:  Negative for chest pain and leg swelling.   Gastrointestinal:  Negative for abdominal pain, diarrhea, nausea and vomiting.   Genitourinary:  Negative for dysuria and hematuria.   Musculoskeletal:  Negative for arthralgias and back pain.   Neurological:  Negative for dizziness and light-headedness.     OBJECTIVE:  /76 (BP Location: Left arm, Patient Position: Sitting, Cuff Size: Standard)   Pulse 99   Ht 5' 4\" (1.626 m)   Wt 56.5 kg (124 lb 9.6 oz)   SpO2 100%   BMI 21.39 kg/m²   Current Weight: Weight - Scale: 56.5 kg (124 lb 9.6 oz) Body mass index is 21.39 kg/m².  Physical Exam  Constitutional:       General: She is not in acute distress.     Appearance: Normal appearance. She is well-developed. She is not ill-appearing, toxic-appearing or diaphoretic.   HENT:      Head: Normocephalic and atraumatic.   Eyes:      General: No scleral icterus.     Conjunctiva/sclera: Conjunctivae " normal.   Neck:      Vascular: No JVD.   Cardiovascular:      Rate and Rhythm: Normal rate and regular rhythm.      Heart sounds: Normal heart sounds.   Pulmonary:      Effort: Pulmonary effort is normal.      Breath sounds: Normal breath sounds.   Abdominal:      General: Bowel sounds are normal.      Palpations: Abdomen is soft.   Musculoskeletal:      Right lower leg: No edema.      Left lower leg: No edema.   Skin:     General: Skin is warm and dry.   Neurological:      Mental Status: She is alert and oriented to person, place, and time.   Psychiatric:         Mood and Affect: Mood normal.         Behavior: Behavior normal. Behavior is cooperative.         Judgment: Judgment normal.       Medications:  Current Outpatient Medications:     Ascorbic Acid (vitamin C) 1000 MG tablet, Take 1,000 mg by mouth daily, Disp: , Rfl:     Calcium Carb-Cholecalciferol (Calcium+D3) 600-20 MG-MCG TABS, Take 1 tablet by mouth in the morning, Disp: , Rfl:     Multiple Vitamin (MULTIVITAMIN ADULT PO), Take 1 tablet by mouth in the morning, Disp: , Rfl:     Nerve Stimulator (TENS Therapy Pain Relief) EDGAR, Use 1 application 2 (two) times a day as needed (For left lumbar radiculopathy), Disp: 1 each, Rfl: 0    Laboratory Results:  Results for orders placed or performed in visit on 10/10/24   Magnesium    Collection Time: 10/10/24 10:19 AM   Result Value Ref Range    Magnesium, Serum 1.9 1.5 - 2.5 mg/dL   Phosphorus    Collection Time: 10/10/24 10:19 AM   Result Value Ref Range    Phosphorus, Serum 3.2 2.5 - 4.5 mg/dL   Comprehensive metabolic panel    Collection Time: 10/10/24 10:19 AM   Result Value Ref Range    Glucose, Random 85 65 - 99 mg/dL    BUN 15 7 - 25 mg/dL    Creatinine 0.64 0.50 - 0.99 mg/dL    eGFR 110 > OR = 60 mL/min/1.73m2    SL AMB BUN/CREATININE RATIO SEE NOTE: 6 - 22 (calc)    Sodium 137 135 - 146 mmol/L    Potassium 4.4 3.5 - 5.3 mmol/L    Chloride 104 98 - 110 mmol/L    CO2 27 20 - 32 mmol/L    Calcium 9.2 8.6 -  10.2 mg/dL    Protein, Total 6.8 6.1 - 8.1 g/dL    Albumin 4.5 3.6 - 5.1 g/dL    Globulin 2.3 1.9 - 3.7 g/dL (calc)    Albumin/Globulin Ratio 2.0 1.0 - 2.5 (calc)    TOTAL BILIRUBIN 1.1 0.2 - 1.2 mg/dL    Alkaline Phosphatase 30 (L) 31 - 125 U/L    AST 14 10 - 35 U/L    ALT 10 6 - 29 U/L   Lymphocyte Subset Panel 2    Collection Time: 10/10/24 10:19 AM   Result Value Ref Range    % CD 3 Pos. Lymph. 84 57 - 85 %    CD3 Abs 1,338 840 - 3,060 cells/uL    CD4 % McGregor T Cell 66 (H) 30 - 61 %    CD4 ABS 1,042 490 - 1,740 cells/uL    CD8 % Suppressor T Cell 17 12 - 42 %    CD8 T Cell Abs 265 180 - 1,170 cells/uL    CD4/CD8 Ratio 3.92 0.86 - 5.00    CD19 (B Cells) 0 (L) 6 - 29 %    CD19 Absolute <20 (L) 110 - 660 cells/uL    Lymphocytes (Absolute) 1,584 850 - 3,900 cells/uL    COMMENT     CBC and differential    Collection Time: 10/10/24 10:19 AM   Result Value Ref Range    White Blood Cell Count 10.2 3.8 - 10.8 Thousand/uL    Red Blood Cell Count 4.18 3.80 - 5.10 Million/uL    Hemoglobin 12.5 11.7 - 15.5 g/dL    HCT 37.5 35.0 - 45.0 %    MCV 89.7 80.0 - 100.0 fL    MCH 29.9 27.0 - 33.0 pg    MCHC 33.3 32.0 - 36.0 g/dL    RDW 11.7 11.0 - 15.0 %    Platelet Count 341 140 - 400 Thousand/uL    SL AMB MPV 9.4 7.5 - 12.5 fL    Neutrophils (Absolute) 7,558 1,500 - 7,800 cells/uL    Lymphocytes (Absolute) 1,540 850 - 3,900 cells/uL    Monocytes (Absolute) 765 200 - 950 cells/uL    Eosinophils (Absolute) 245 15 - 500 cells/uL    Basophils ABS 92 0 - 200 cells/uL    Neutrophils 74.1 %    Lymphocytes 15.1 %    Monocytes 7.5 %    Eosinophils 2.4 %    Basophils PCT 0.9 %   Protein, Total w/Creat, Random Urine    Collection Time: 10/10/24 10:19 AM   Result Value Ref Range    Creatinine, Urine 53 20 - 275 mg/dL    Protein/Creat Ratio NOTE 24 - 184 mg/g creat    Protein/Creat Ratio NOTE 0.024 - 0.184 mg/mg creat    Total Protein, Urine <4 (L) 5 - 24 mg/dL   UA (URINE) with reflex to Scope    Collection Time: 10/10/24 10:19 AM   Result  Value Ref Range    Color UA YELLOW YELLOW    Urine Appearance CLEAR CLEAR    Specific Gravity 1.012 1.001 - 1.035    Ph 6.0 5.0 - 8.0    Glucose, Urine NEGATIVE NEGATIVE    Bilirubin, Urine NEGATIVE NEGATIVE    Ketone, Urine NEGATIVE NEGATIVE    Blood, Urine NEGATIVE NEGATIVE    Protein, Urine NEGATIVE NEGATIVE    Nitrites Urine NEGATIVE NEGATIVE    Leukocyte Esterase NEGATIVE NEGATIVE

## 2024-10-24 NOTE — PATIENT INSTRUCTIONS
Your blood and urine tests look great and there is no evidence of the minimal change disease coming back.   Please continue with the Rituximab as scheduled in Nov 2024.   If your blood work appears favorable in May 2025, we may delay that dose for 2 months.  Follow-up in 6 months.

## 2024-10-24 NOTE — ASSESSMENT & PLAN NOTE
No evidence of nephrotic syndrome at this time.  Albumin is normal at 4.5.  UPC ratio is undetectable.

## 2024-10-24 NOTE — ASSESSMENT & PLAN NOTE
Summary: Diagnosed in Sep 2019 and responded well to Prednisone - Was treated with Prednisone from 10/1/19 to 4/4/20. Had relapse of SHANTEL in May 2020 and restarted Prednisone on 5/18/20. Unfortunately, she had a relapse in Dec 2020 while Prednisone was being titrated down. Prednisone was increased and Tacrolimus was added on 2/3/21. Prednisone has been weaned off since June 2021. Remission was maintained with Tacrolimus but would have increase in proteinuria when Tacrolimus is weaned. Started Rituximab 1000 mg x 2 doses in April 24, 2023 and May 5, 2023. Tacrolimus weaned off since October 2023.   Last dose of Rituximab was in May 2024.   Currently, she has maintained remission.   Would continue Rituximab 1000 mg IV every 6 months for now.   I will arrange for Rituximab in Nov 2024.   Check T&B lymphocytes before next scheduled dose in May 2025. We may consider delaying the May 2025 dose.     Orders:    CBC and differential; Future    Comprehensive metabolic panel; Future    Magnesium; Future    Phosphorus; Future    Urinalysis with microscopic; Future    Protein / creatinine ratio, urine; Future    T&B Lymphocytes; Future

## 2024-11-12 ENCOUNTER — TELEPHONE (OUTPATIENT)
Dept: NEPHROLOGY | Facility: CLINIC | Age: 49
End: 2024-11-12

## 2024-11-12 NOTE — TELEPHONE ENCOUNTER
Waiting for call back from [employer]  PLEASE TRANSFER TO [facility] IN ADAM OFFICE.  VERY IMPORTANT

## 2024-11-13 DIAGNOSIS — N18.31 STAGE 3A CHRONIC KIDNEY DISEASE (HCC): Primary | ICD-10-CM

## 2024-11-15 ENCOUNTER — TELEPHONE (OUTPATIENT)
Dept: NEPHROLOGY | Facility: CLINIC | Age: 49
End: 2024-11-15

## 2024-11-15 ENCOUNTER — HOSPITAL ENCOUNTER (OUTPATIENT)
Dept: INFUSION CENTER | Facility: CLINIC | Age: 49
End: 2024-11-15
Payer: COMMERCIAL

## 2024-11-15 VITALS
RESPIRATION RATE: 18 BRPM | SYSTOLIC BLOOD PRESSURE: 124 MMHG | TEMPERATURE: 98 F | OXYGEN SATURATION: 99 % | HEART RATE: 91 BPM | DIASTOLIC BLOOD PRESSURE: 81 MMHG

## 2024-11-15 DIAGNOSIS — N05.0 MINIMAL CHANGE DISEASE: Primary | ICD-10-CM

## 2024-11-15 PROCEDURE — 96413 CHEMO IV INFUSION 1 HR: CPT

## 2024-11-15 PROCEDURE — 96415 CHEMO IV INFUSION ADDL HR: CPT

## 2024-11-15 RX ORDER — ACETAMINOPHEN 325 MG/1
650 TABLET ORAL ONCE
OUTPATIENT
Start: 2025-05-15

## 2024-11-15 RX ORDER — SODIUM CHLORIDE 9 MG/ML
20 INJECTION, SOLUTION INTRAVENOUS ONCE
Status: COMPLETED | OUTPATIENT
Start: 2024-11-15 | End: 2024-11-15

## 2024-11-15 RX ORDER — DIPHENHYDRAMINE HCL 25 MG
25 TABLET ORAL ONCE
OUTPATIENT
Start: 2025-05-15

## 2024-11-15 RX ORDER — SODIUM CHLORIDE 9 MG/ML
20 INJECTION, SOLUTION INTRAVENOUS ONCE
OUTPATIENT
Start: 2025-05-15

## 2024-11-15 RX ORDER — ACETAMINOPHEN 325 MG/1
650 TABLET ORAL ONCE
Status: COMPLETED | OUTPATIENT
Start: 2024-11-15 | End: 2024-11-15

## 2024-11-15 RX ORDER — DIPHENHYDRAMINE HCL 25 MG
25 TABLET ORAL ONCE
Status: COMPLETED | OUTPATIENT
Start: 2024-11-15 | End: 2024-11-15

## 2024-11-15 RX ADMIN — DIPHENHYDRAMINE HYDROCHLORIDE 25 MG: 25 TABLET ORAL at 07:50

## 2024-11-15 RX ADMIN — SODIUM CHLORIDE 1000 MG: 0.9 INJECTION, SOLUTION INTRAVENOUS at 08:55

## 2024-11-15 RX ADMIN — SODIUM CHLORIDE 20 ML/HR: 0.9 INJECTION, SOLUTION INTRAVENOUS at 07:30

## 2024-11-15 RX ADMIN — ACETAMINOPHEN 650 MG: 325 TABLET ORAL at 07:50

## 2024-12-02 ENCOUNTER — NURSE TRIAGE (OUTPATIENT)
Age: 49
End: 2024-12-02

## 2024-12-02 NOTE — TELEPHONE ENCOUNTER
"Pt c/o blood in the toilet bowl and toilet paper. Denies pain or discomfort, constipation, or diarrhea.  Has an appointment on Thursday with Dr Reyes.  ED precautions given      Reason for Disposition   MILD rectal bleeding (more than just a few drops or streaks)    Answer Assessment - Initial Assessment Questions  1. APPEARANCE of BLOOD: \"What color is it?\" \"Is it passed separately, on the surface of the stool, or mixed in with the stool?\"       Bright red blood  2. AMOUNT: \"How much blood was passed?\"       Toilet water is red  3. FREQUENCY: \"How many times has blood been passed with the stools?\"       3 or 4 times over the weekend  4. ONSET: \"When was the blood first seen in the stools?\" (Days or weeks)       Over the weekend  5. DIARRHEA: \"Is there also some diarrhea?\" If Yes, ask: \"How many diarrhea stools in the past 24 hours?\"       No   6. CONSTIPATION: \"Do you have constipation?\" If Yes, ask: \"How bad is it?\"      no  7. RECURRENT SYMPTOMS: \"Have you had blood in your stools before?\" If Yes, ask: \"When was the last time?\" and \"What happened that time?\"        A month or 2 ago  8. BLOOD THINNERS: \"Do you take any blood thinners?\" (e.g., Coumadin/warfarin, Pradaxa/dabigatran, aspirin)      no  9. OTHER SYMPTOMS: \"Do you have any other symptoms?\"  (e.g., abdomen pain, vomiting, dizziness, fever)      no  10. PREGNANCY: \"Is there any chance you are pregnant?\" \"When was your last menstrual period?\"        no    Protocols used: Rectal Bleeding-Adult-[place]    "

## 2024-12-05 ENCOUNTER — OFFICE VISIT (OUTPATIENT)
Dept: INTERNAL MEDICINE CLINIC | Facility: CLINIC | Age: 49
End: 2024-12-05
Payer: COMMERCIAL

## 2024-12-05 VITALS
HEART RATE: 93 BPM | OXYGEN SATURATION: 98 % | TEMPERATURE: 98.1 F | RESPIRATION RATE: 16 BRPM | BODY MASS INDEX: 21.75 KG/M2 | HEIGHT: 64 IN | DIASTOLIC BLOOD PRESSURE: 80 MMHG | SYSTOLIC BLOOD PRESSURE: 110 MMHG | WEIGHT: 127.4 LBS

## 2024-12-05 DIAGNOSIS — Z12.11 SCREEN FOR COLON CANCER: ICD-10-CM

## 2024-12-05 DIAGNOSIS — K62.5 BRBPR (BRIGHT RED BLOOD PER RECTUM): Primary | ICD-10-CM

## 2024-12-05 PROCEDURE — 99213 OFFICE O/P EST LOW 20 MIN: CPT | Performed by: INTERNAL MEDICINE

## 2024-12-05 NOTE — PROGRESS NOTES
Name: Bessy Silva      : 1975      MRN: [id number]  Encounter Provider: Lea Reyes, MD  Encounter Date: 2024   Encounter department: [facility]    Assessment & Plan  BRBPR (bright red blood per rectum)  Suspect hemorrhoidal bleed, less likely diverticular bleed or in inflammatory bowel disease  No previous colonoscopy so instructed to schedule with gastroenterology  Continue high-fiber diet, adequate hydration  May try stool softener  Orders:    Ambulatory Referral to Gastroenterology; Future    Screen for colon cancer    Orders:    Ambulatory Referral to Gastroenterology; Future         History of Present Illness     Bright red blood per rectum, a few drops of blood after bowel movement started 6 days ago.  It occurred with each bowel movement for about 3 days and has stopped.  There was no rectal/anal discomfort /pain.  Denies fever chills change in appetite weight loss abdominal pain change in bowel habits nausea vomiting.  She has regular bowel movements  No new medications, change in diet  No previous colonoscopy        Review of Systems   Constitutional:  Negative for appetite change, chills, fever and unexpected weight change.   Gastrointestinal:  Positive for anal bleeding. Negative for abdominal pain, constipation, diarrhea, nausea and vomiting.   Genitourinary:  Negative for vaginal bleeding.     Past Medical History:   Diagnosis Date    Concussion with loss of consciousness 10/6/2023    Fibroid     Folliculitis 2021    Hyperlipidemia     Nephrotic syndrome      Past Surgical History:   Procedure Laterality Date    IR BIOPSY KIDNEY MASS  2019    TUBAL LIGATION      WISDOM TOOTH EXTRACTION       Family History   Problem Relation Age of Onset    No Known Problems Mother     Heart failure Father     No Known Problems Sister     No Known Problems Daughter     Diabetes Paternal Grandmother     No Known Problems Brother     No Known Problems Brother     No  "Known Problems Son     No Known Problems Son     No Known Problems Maternal Aunt     No Known Problems Maternal Aunt     No Known Problems Paternal Aunt      Social History     Tobacco Use    Smoking status: Every Day     Current packs/day: 0.25     Average packs/day: 0.3 packs/day for 25.2 years (6.3 ttl pk-yrs)     Types: Cigarettes    Smokeless tobacco: Never    Tobacco comments:     3 cigarettes a day   Vaping Use    Vaping status: Never Used   Substance and Sexual Activity    Alcohol use: Not Currently     Comment: occasionally    Drug use: Never    Sexual activity: Yes     Partners: Male     Birth control/protection: Surgical     Current Outpatient Medications on File Prior to Visit   Medication Sig    Ascorbic Acid (vitamin C) 1000 MG tablet Take 1,000 mg by mouth daily    Calcium Carb-Cholecalciferol (Calcium+D3) 600-20 MG-MCG TABS Take 1 tablet by mouth in the morning    Multiple Vitamin (MULTIVITAMIN ADULT PO) Take 1 tablet by mouth in the morning    Nerve Stimulator (TENS Therapy Pain Relief) EDGAR Use 1 application 2 (two) times a day as needed (For left lumbar radiculopathy)     No Known Allergies  Immunization History   Administered Date(s) Administered    COVID-19 MODERNA VACC 0.5 ML IM 08/26/2021, 05/26/2022, 09/23/2022, 01/11/2023    INFLUENZA 11/11/2017, 11/10/2018, 11/16/2019, 11/14/2020, 11/12/2022    Influenza Injectable, MDCK, Preservative Free, Quadrivalent 11/16/2023    Pneumococcal Conjugate Vaccine 20-valent (Pcv20), Polysace 01/25/2023    Tdap 01/25/2023     Objective   /80 (BP Location: Left arm, Patient Position: Sitting, Cuff Size: Standard)   Pulse 93   Temp 98.1 °F (36.7 °C) (Tympanic)   Resp 16   Ht 5' 4\" (1.626 m)   Wt 57.8 kg (127 lb 6.4 oz)   LMP  (LMP Unknown)   SpO2 98%   BMI 21.87 kg/m²     Physical Exam  Constitutional:       General: She is not in acute distress.     Appearance: She is well-developed. She is not ill-appearing, toxic-appearing or diaphoretic. "   Eyes:      Conjunctiva/sclera: Conjunctivae normal.   Cardiovascular:      Rate and Rhythm: Normal rate and regular rhythm.      Heart sounds: Normal heart sounds. No murmur heard.  Pulmonary:      Effort: Pulmonary effort is normal. No respiratory distress.      Breath sounds: Normal breath sounds. No stridor. No wheezing or rales.   Abdominal:      General: There is no distension.      Palpations: Abdomen is soft. There is no mass.      Tenderness: There is no abdominal tenderness. There is no guarding or rebound.   Genitourinary:     Comments: Declined rectal exam  Musculoskeletal:      Cervical back: Neck supple.   Neurological:      Mental Status: She is alert and oriented to person, place, and time.   Psychiatric:         Mood and Affect: Mood normal.         Behavior: Behavior normal.         Thought Content: Thought content normal.         Judgment: Judgment normal.

## 2024-12-13 ENCOUNTER — TELEPHONE (OUTPATIENT)
Dept: GASTROENTEROLOGY | Facility: CLINIC | Age: 49
End: 2024-12-13

## 2024-12-13 NOTE — TELEPHONE ENCOUNTER
LMOM Hello, I'm calling from [facility]. Your doctor referred you to our clinic for a consultation with one of our gastro specialists please call the office to schedule an appointment at [phone].

## 2025-02-06 ENCOUNTER — HOSPITAL ENCOUNTER (OUTPATIENT)
Dept: RADIOLOGY | Age: 50
Discharge: HOME | End: 2025-02-06
Payer: COMMERCIAL

## 2025-02-06 DIAGNOSIS — Z12.31 ENCOUNTER FOR SCREENING MAMMOGRAM FOR MALIGNANT NEOPLASM OF BREAST: ICD-10-CM

## 2025-02-06 PROCEDURE — 77063 BREAST TOMOSYNTHESIS BI: CPT

## 2025-02-06 PROCEDURE — 77067 SCR MAMMO BI INCL CAD: CPT

## 2025-03-14 ENCOUNTER — OFFICE VISIT (OUTPATIENT)
Dept: INTERNAL MEDICINE CLINIC | Facility: CLINIC | Age: 50
End: 2025-03-14
Payer: COMMERCIAL

## 2025-03-14 VITALS
SYSTOLIC BLOOD PRESSURE: 106 MMHG | HEART RATE: 101 BPM | DIASTOLIC BLOOD PRESSURE: 60 MMHG | WEIGHT: 127.8 LBS | BODY MASS INDEX: 21.82 KG/M2 | HEIGHT: 64 IN

## 2025-03-14 DIAGNOSIS — Z12.11 COLON CANCER SCREENING: ICD-10-CM

## 2025-03-14 DIAGNOSIS — Z13.220 SCREENING, LIPID: ICD-10-CM

## 2025-03-14 DIAGNOSIS — Z00.00 ANNUAL PHYSICAL EXAM: Primary | ICD-10-CM

## 2025-03-14 PROCEDURE — 99396 PREV VISIT EST AGE 40-64: CPT | Performed by: INTERNAL MEDICINE

## 2025-03-14 NOTE — PATIENT INSTRUCTIONS
"Patient Education     Routine physical for adults   The Basics   Written by the doctors and editors at [facility]   What is a physical? -- A physical is a routine visit, or \"check-up,\" with your doctor. You might also hear it called a \"wellness visit\" or \"preventive visit.\"  During each visit, the doctor will:   Ask about your physical and mental health   Ask about your habits, behaviors, and lifestyle   Do an exam   Give you vaccines if needed   Talk to you about any medicines you take   Give advice about your health   Answer your questions  Getting regular check-ups is an important part of taking care of your health. It can help your doctor find and treat any problems you have. But it's also important for preventing health problems.  A routine physical is different from a \"sick visit.\" A sick visit is when you see a doctor because of a health concern or problem. Since physicals are scheduled ahead of time, you can think about what you want to ask the doctor.  How often should I get a physical? -- It depends on your age and health. In general, for people age 21 years and older:   If you are younger than 50 years, you might be able to get a physical every 3 years.   If you are 50 years or older, your doctor might recommend a physical every year.  If you have an ongoing health condition, like diabetes or high blood pressure, your doctor will probably want to see you more often.  What happens during a physical? -- In general, each visit will include:   Physical exam - The doctor or nurse will check your height, weight, heart rate, and blood pressure. They will also look at your eyes and ears. They will ask about how you are feeling and whether you have any symptoms that bother you.   Medicines - It's a good idea to bring a list of all the medicines you take to each doctor visit. Your doctor will talk to you about your medicines and answer any questions. Tell them if you are having any side effects that bother you. You " "should also tell them if you are having trouble paying for any of your medicines.   Habits and behaviors - This includes:   Your diet   Your exercise habits   Whether you smoke, drink alcohol, or use drugs   Whether you are sexually active   Whether you feel safe at home  Your doctor will talk to you about things you can do to improve your health and lower your risk of health problems. They will also offer help and support. For example, if you want to quit smoking, they can give you advice and might prescribe medicines. If you want to improve your diet or get more physical activity, they can help you with this, too.   Lab tests, if needed - The tests you get will depend on your age and situation. For example, your doctor might want to check your:   Cholesterol   Blood sugar   Iron level   Vaccines - The recommended vaccines will depend on your age, health, and what vaccines you already had. Vaccines are very important because they can prevent certain serious or deadly infections.   Discussion of screening - \"Screening\" means checking for diseases or other health problems before they cause symptoms. Your doctor can recommend screening based on your age, risk, and preferences. This might include tests to check for:   Cancer, such as breast, prostate, cervical, ovarian, colorectal, prostate, lung, or skin cancer   Sexually transmitted infections, such as chlamydia and gonorrhea   Mental health conditions like depression and anxiety  Your doctor will talk to you about the different types of screening tests. They can help you decide which screenings to have. They can also explain what the results might mean.   Answering questions - The physical is a good time to ask the doctor or nurse questions about your health. If needed, they can refer you to other doctors or specialists, too.  Adults older than 65 years often need other care, too. As you get older, your doctor will talk to you about:   How to prevent falling at " home   Hearing or vision tests   Memory testing   How to take your medicines safely   Making sure that you have the help and support you need at home  All topics are updated as new evidence becomes available and our peer review process is complete.  This topic retrieved from [employer] on: May 02, 2024.  Topic 703350 Version 1.0  Release: 32.4.3 - C32.122  © 2024 UpToDate, Inc. and/or its affiliates. All rights reserved.  Consumer Information Use and Disclaimer   Disclaimer: This generalized information is a limited summary of diagnosis, treatment, and/or medication information. It is not meant to be comprehensive and should be used as a tool to help the user understand and/or assess potential diagnostic and treatment options. It does NOT include all information about conditions, treatments, medications, side effects, or risks that may apply to a specific patient. It is not intended to be medical advice or a substitute for the medical advice, diagnosis, or treatment of a health care provider based on the health care provider's examination and assessment of a patient's specific and unique circumstances. Patients must speak with a health care provider for complete information about their health, medical questions, and treatment options, including any risks or benefits regarding use of medications. This information does not endorse any treatments or medications as safe, effective, or approved for treating a specific patient. UpToDate, Inc. and its affiliates disclaim any warranty or liability relating to this information or the use thereof.The use of this information is governed by the Terms of Use, available at https://www.w[employer]tersMoni Technologiesuwer.com/en/know/clinical-effectiveness-terms. 2024© UpToDate, Inc. and its affiliates and/or licensors. All rights reserved.  Copyright   © 2024 UpToDate, Inc. and/or its affiliates. All rights reserved.

## 2025-03-14 NOTE — PROGRESS NOTES
"Adult Annual Physical  Name: Bessy Silva      : 1975      MRN: [id number]  Encounter Provider: Lea Reyes, MD  Encounter Date: 3/14/2025   Encounter department: [facility]    Assessment & Plan  Annual physical exam         Screening, lipid    Orders:  •  Lipid panel; Future    Colon cancer screening  Patient strongly recommended to schedule colonoscopy         Preventive Screenings:    - Breast cancer screening: screening up-to-date     Immunizations:  - Immunizations due: Influenza         History of Present Illness     Adult Annual Physical:  Patient presents for annual physical.     Diet and Physical Activity:  - Diet/Nutrition: well balanced diet.  - Exercise: 5-7 times a week on average.    Depression Screening:  - PHQ-2 Score: 0    General Health:  - Sleep: sleeps well.  - Hearing: normal hearing bilateral ears.    /GYN Health:  - Follows with GYN: yes.   - Menopause: perimenopausal.     Review of Systems   Constitutional:  Negative for unexpected weight change.   Respiratory:  Negative for shortness of breath.    Cardiovascular:  Negative for chest pain and palpitations.   Gastrointestinal:  Negative for abdominal pain, blood in stool, constipation and diarrhea.   Genitourinary:  Negative for difficulty urinating.   Neurological:  Negative for dizziness and headaches.         Objective   /60 (BP Location: Left arm, Patient Position: Sitting, Cuff Size: Standard)   Pulse 101   Ht 5' 4\" (1.626 m)   Wt 58 kg (127 lb 12.8 oz)   BMI 21.94 kg/m²     Physical Exam  Constitutional:       General: She is not in acute distress.     Appearance: She is well-developed. She is not ill-appearing, toxic-appearing or diaphoretic.   HENT:      Right Ear: External ear normal. There is no impacted cerumen.      Left Ear: External ear normal. There is no impacted cerumen.   Eyes:      Conjunctiva/sclera: Conjunctivae normal.   Cardiovascular:      Rate and Rhythm: Normal rate and " regular rhythm.      Heart sounds: Normal heart sounds. No murmur heard.  Pulmonary:      Effort: Pulmonary effort is normal. No respiratory distress.      Breath sounds: Normal breath sounds. No stridor. No wheezing or rales.   Abdominal:      General: There is no distension.      Palpations: Abdomen is soft. There is no mass.      Tenderness: There is no abdominal tenderness. There is no guarding or rebound.   Musculoskeletal:      Cervical back: Neck supple.      Right lower leg: No edema.      Left lower leg: No edema.   Neurological:      Mental Status: She is alert and oriented to person, place, and time.   Psychiatric:         Mood and Affect: Mood normal.         Behavior: Behavior normal.         Thought Content: Thought content normal.         Judgment: Judgment normal.

## 2025-03-24 ENCOUNTER — HOSPITAL ENCOUNTER (OUTPATIENT)
Dept: MAMMOGRAPHY | Facility: CLINIC | Age: 50
Discharge: HOME | End: 2025-03-24
Payer: COMMERCIAL

## 2025-03-24 ENCOUNTER — HOSPITAL ENCOUNTER (OUTPATIENT)
Dept: ULTRASOUND IMAGING | Facility: CLINIC | Age: 50
Discharge: HOME | End: 2025-03-24
Payer: COMMERCIAL

## 2025-03-24 VITALS — HEIGHT: 64 IN | BODY MASS INDEX: 21.68 KG/M2 | WEIGHT: 127 LBS

## 2025-03-24 DIAGNOSIS — R92.8 ABNORMAL MAMMOGRAM: ICD-10-CM

## 2025-03-24 PROCEDURE — G0279 TOMOSYNTHESIS, MAMMO: HCPCS

## 2025-03-24 PROCEDURE — 77065 DX MAMMO INCL CAD UNI: CPT

## 2025-03-24 PROCEDURE — 76642 ULTRASOUND BREAST LIMITED: CPT

## 2025-03-24 NOTE — PROGRESS NOTES
Met with patient and Dr. Kocher  regarding recommendation for;    ___x__ RIGHT ______LEFT      ___x__Ultrasound guided  ______Stereotactic breast biopsy.      __X___Verbalized understanding.    Reviewed clip placement with patient, pt states understanding: Yes: _x___ No: ____  Comments:    Blood thinners:  No: ___x__ Yes: ______ What:          Biopsy teaching sheet given:  Yes: ___X___ No: ________    Pt given contact information and adv to call with any questions/needs    Patient advised to arrive at 1330 for a 1400 appointment

## 2025-04-02 LAB
ALBUMIN SERPL-MCNC: 4.4 G/DL (ref 3.6–5.1)
ALBUMIN/GLOB SERPL: 1.8 (CALC) (ref 1–2.5)
ALP SERPL-CCNC: 37 U/L (ref 31–125)
ALT SERPL-CCNC: 14 U/L (ref 6–29)
APPEARANCE UR: CLEAR
AST SERPL-CCNC: 18 U/L (ref 10–35)
BACTERIA UR QL AUTO: NORMAL /HPF
BASOPHILS # BLD AUTO: 92 CELLS/UL (ref 0–200)
BASOPHILS NFR BLD AUTO: 1.4 %
BILIRUB SERPL-MCNC: 1.4 MG/DL (ref 0.2–1.2)
BILIRUB UR QL STRIP: NEGATIVE
BUN SERPL-MCNC: 11 MG/DL (ref 7–25)
BUN/CREAT SERPL: ABNORMAL (CALC) (ref 6–22)
CALCIUM SERPL-MCNC: 9.5 MG/DL (ref 8.6–10.2)
CD19 CELLS # BLD: <20 CELLS/UL (ref 110–660)
CD19 CELLS NFR BLD: 0 % (ref 6–29)
CD3 CELLS # BLD: 1463 CELLS/UL (ref 840–3060)
CD3 CELLS NFR BLD: 89 % (ref 57–85)
CD3+CD4+ CELLS # BLD: 1223 CELLS/UL (ref 490–1740)
CD3+CD4+ CELLS NFR BLD: 72 % (ref 30–61)
CD3+CD4+ CELLS/CD3+CD8+ CLL BLD: 4.21 % (ref 0.86–5)
CD3+CD8+ CELLS # BLD: 290 CELLS/UL (ref 180–1170)
CD3+CD8+ CELLS NFR BLD: 17 % (ref 12–42)
CHLORIDE SERPL-SCNC: 102 MMOL/L (ref 98–110)
CHOLEST SERPL-MCNC: 207 MG/DL
CHOLEST/HDLC SERPL: 2.3 (CALC)
CO2 SERPL-SCNC: 29 MMOL/L (ref 20–32)
COLOR UR: YELLOW
CREAT SERPL-MCNC: 0.62 MG/DL (ref 0.5–0.99)
CREAT UR-MCNC: 40 MG/DL (ref 20–275)
EOSINOPHIL # BLD AUTO: 172 CELLS/UL (ref 15–500)
EOSINOPHIL NFR BLD AUTO: 2.6 %
ERYTHROCYTE [DISTWIDTH] IN BLOOD BY AUTOMATED COUNT: 11.3 % (ref 11–15)
GFR/BSA.PRED SERPLBLD CYS-BASED-ARV: 110 ML/MIN/1.73M2
GLOBULIN SER CALC-MCNC: 2.5 G/DL (CALC) (ref 1.9–3.7)
GLUCOSE SERPL-MCNC: 82 MG/DL (ref 65–99)
GLUCOSE UR QL STRIP: NEGATIVE
HCT VFR BLD AUTO: 41.4 % (ref 35–45)
HDLC SERPL-MCNC: 92 MG/DL
HGB BLD-MCNC: 13.8 G/DL (ref 11.7–15.5)
HGB UR QL STRIP: NEGATIVE
HYALINE CASTS #/AREA URNS LPF: NORMAL /LPF
KETONES UR QL STRIP: NEGATIVE
LDLC SERPL CALC-MCNC: 100 MG/DL (CALC)
LEUKOCYTE ESTERASE UR QL STRIP: NEGATIVE
LYMPHOCYTES # BLD AUTO: 1643 CELLS/UL (ref 850–3900)
LYMPHOCYTES # BLD AUTO: 1696 CELLS/UL (ref 850–3900)
LYMPHOCYTES NFR BLD AUTO: 25.7 %
MAGNESIUM SERPL-MCNC: 2.1 MG/DL (ref 1.5–2.5)
MCH RBC QN AUTO: 30.3 PG (ref 27–33)
MCHC RBC AUTO-ENTMCNC: 33.3 G/DL (ref 32–36)
MCV RBC AUTO: 90.8 FL (ref 80–100)
MONOCYTES # BLD AUTO: 574 CELLS/UL (ref 200–950)
MONOCYTES NFR BLD AUTO: 8.7 %
NEUTROPHILS # BLD AUTO: 4066 CELLS/UL (ref 1500–7800)
NEUTROPHILS NFR BLD AUTO: 61.6 %
NITRITE UR QL STRIP: NEGATIVE
NONHDLC SERPL-MCNC: 115 MG/DL (CALC)
PH UR STRIP: 7 [PH] (ref 5–8)
PHOSPHATE SERPL-MCNC: 3.6 MG/DL (ref 2.5–4.5)
PLATELET # BLD AUTO: 369 THOUSAND/UL (ref 140–400)
PMV BLD REES-ECKER: 9.3 FL (ref 7.5–12.5)
POTASSIUM SERPL-SCNC: 4 MMOL/L (ref 3.5–5.3)
PROT SERPL-MCNC: 6.9 G/DL (ref 6.1–8.1)
PROT UR QL STRIP: NEGATIVE
PROT UR-MCNC: <4 MG/DL (ref 5–24)
PROT/CREAT UR: ABNORMAL MG/G CREAT (ref 24–184)
PROT/CREAT UR: ABNORMAL MG/MG CREAT (ref 0.02–0.18)
RBC # BLD AUTO: 4.56 MILLION/UL (ref 3.8–5.1)
RBC #/AREA URNS HPF: NORMAL /HPF
SODIUM SERPL-SCNC: 138 MMOL/L (ref 135–146)
SP GR UR STRIP: 1.01 (ref 1–1.03)
SQUAMOUS #/AREA URNS HPF: NORMAL /HPF
TRIGL SERPL-MCNC: 60 MG/DL
WBC # BLD AUTO: 6.6 THOUSAND/UL (ref 3.8–10.8)
WBC #/AREA URNS HPF: NORMAL /HPF

## 2025-04-06 ENCOUNTER — RESULTS FOLLOW-UP (OUTPATIENT)
Dept: INTERNAL MEDICINE CLINIC | Facility: CLINIC | Age: 50
End: 2025-04-06

## 2025-04-15 ENCOUNTER — HOSPITAL ENCOUNTER (OUTPATIENT)
Dept: MAMMOGRAPHY | Facility: CLINIC | Age: 50
Discharge: HOME | End: 2025-04-15
Attending: PHYSICIAN ASSISTANT
Payer: COMMERCIAL

## 2025-04-15 ENCOUNTER — HOSPITAL ENCOUNTER (OUTPATIENT)
Dept: ULTRASOUND IMAGING | Facility: CLINIC | Age: 50
Discharge: HOME | End: 2025-04-15
Payer: COMMERCIAL

## 2025-04-15 ENCOUNTER — OFFICE VISIT (OUTPATIENT)
Dept: NEPHROLOGY | Facility: CLINIC | Age: 50
End: 2025-04-15
Payer: COMMERCIAL

## 2025-04-15 ENCOUNTER — HOSPITAL ENCOUNTER (OUTPATIENT)
Dept: MAMMOGRAPHY | Facility: CLINIC | Age: 50
Discharge: HOME | End: 2025-04-15
Payer: COMMERCIAL

## 2025-04-15 VITALS — DIASTOLIC BLOOD PRESSURE: 70 MMHG | SYSTOLIC BLOOD PRESSURE: 120 MMHG | HEART RATE: 97 BPM

## 2025-04-15 VITALS
WEIGHT: 127 LBS | SYSTOLIC BLOOD PRESSURE: 108 MMHG | HEART RATE: 91 BPM | BODY MASS INDEX: 21.68 KG/M2 | DIASTOLIC BLOOD PRESSURE: 80 MMHG | OXYGEN SATURATION: 99 % | HEIGHT: 64 IN

## 2025-04-15 VITALS — DIASTOLIC BLOOD PRESSURE: 79 MMHG | HEART RATE: 93 BPM | SYSTOLIC BLOOD PRESSURE: 124 MMHG

## 2025-04-15 DIAGNOSIS — N05.0 MINIMAL CHANGE DISEASE: Primary | ICD-10-CM

## 2025-04-15 DIAGNOSIS — R92.8 ABNORMAL FINDING ON BREAST IMAGING: ICD-10-CM

## 2025-04-15 DIAGNOSIS — N04.0 NEPHROTIC SYNDROME WITH LESION OF MINIMAL CHANGE GLOMERULONEPHRITIS: ICD-10-CM

## 2025-04-15 DIAGNOSIS — R92.8 ABNORMAL MAMMOGRAM: ICD-10-CM

## 2025-04-15 PROCEDURE — 99214 OFFICE O/P EST MOD 30 MIN: CPT | Performed by: INTERNAL MEDICINE

## 2025-04-15 PROCEDURE — 88305 TISSUE EXAM BY PATHOLOGIST: CPT | Performed by: PATHOLOGY

## 2025-04-15 PROCEDURE — A4648 IMPLANTABLE TISSUE MARKER: HCPCS

## 2025-04-15 PROCEDURE — 19081 BX BREAST 1ST LESION STRTCTC: CPT

## 2025-04-15 PROCEDURE — 19083 BX BREAST 1ST LESION US IMAG: CPT

## 2025-04-15 RX ORDER — LIDOCAINE HYDROCHLORIDE AND EPINEPHRINE BITARTRATE 20; .01 MG/ML; MG/ML
10 INJECTION, SOLUTION SUBCUTANEOUS ONCE
Status: COMPLETED | OUTPATIENT
Start: 2025-04-15 | End: 2025-04-15

## 2025-04-15 RX ORDER — LIDOCAINE HYDROCHLORIDE 10 MG/ML
5 INJECTION, SOLUTION EPIDURAL; INFILTRATION; INTRACAUDAL; PERINEURAL ONCE
Status: COMPLETED | OUTPATIENT
Start: 2025-04-15 | End: 2025-04-15

## 2025-04-15 RX ADMIN — LIDOCAINE HYDROCHLORIDE,EPINEPHRINE BITARTRATE 10 ML: 20; .01 INJECTION, SOLUTION INFILTRATION; PERINEURAL at 15:16

## 2025-04-15 RX ADMIN — LIDOCAINE HYDROCHLORIDE 5 ML: 10 INJECTION, SOLUTION EPIDURAL; INFILTRATION; INTRACAUDAL; PERINEURAL at 14:33

## 2025-04-15 RX ADMIN — LIDOCAINE HYDROCHLORIDE 5 ML: 10 INJECTION, SOLUTION EPIDURAL; INFILTRATION; INTRACAUDAL; PERINEURAL at 15:16

## 2025-04-15 NOTE — PROGRESS NOTES
Procedure type:    _____ultrasound guided __x___stereotactic    Breast:    _____Left ___x__Right    Location: 9:00    Needle: 10g    # of passes:10 cores all submitted     Clip: U Shape     Performed by: Dr. Garcia     Pressure held for 5 minutes by: Sly Holman Strips:    ___X__yes _____no    Band aid: (pressure dressing applied with 4x4 gauze and paper tape)      __X___yes_____no    Tolerated procedure:    __X___yes _____no

## 2025-04-15 NOTE — PATIENT INSTRUCTIONS
Your kidney function is normal.  Your minimal-change disease remains in remission.  We will plan to delay your May 2025 infusion to July 2025.  Please check labs in July 2025.   Repeat labs in November 2025.   Follow up in 9 months.

## 2025-04-15 NOTE — PROGRESS NOTES
Procedure type:    __x___ultrasound guided _____stereotactic    Breast:    _____Left ___x__Right    Location: 4 o'clock 5 cmfn    Needle: 12g     # of passes: 4    Clip: Ribbon     Performed by: Dr. Garcia     Pressure held for 5 minutes by: Sly Holman Strips:    ___X__yes _____no    Band aid:    __X___yes_____no    Tolerated procedure:    __X___yes _____no

## 2025-04-15 NOTE — ASSESSMENT & PLAN NOTE
Renal function is normal.  No proteinuria at this time.  Monitor for recurrence of nephrotic syndrome with decreasing frequency of rituximab.  Repeat labs in November 2025.

## 2025-04-15 NOTE — PROGRESS NOTES
NEPHROLOGY OFFICE PROGRESS NOTE   Bessy Silva 50 y.o. female MRN: [id number]  DATE: 04/15/25  Reason for visit: Continued evaluation and management of SHANTEL     Assessment & Plan  Minimal change disease  Currently in remission.   Last dose of Rituximab was November 2024.   She is on Rituximab 1000 mg IV every 6 months.   CD19 is <20 - Will change Rituximab to 1000 mg IV every 8 months.   She is due in May 2025 but will move this to July 2025.   Check CD19 in July 2025.     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  No proteinuria at this time.  Monitor for recurrence of nephrotic syndrome with decreasing frequency of rituximab.  Repeat labs in November 2025.      Patient Instructions   Your kidney function is normal.  Your minimal-change disease remains in remission.  We will plan to delay your May 2025 infusion to July 2025.  Please check labs in July 2025.   Repeat labs in November 2025.   Follow up in 9 months.     SUBJECTIVE / INTERVAL HISTORY:  Naina was last seen by me in the office in Oct 2024.   No hospitalizations or ER visits.   No medical issues.   No acute complaints.   Doing well overall.     Treatment history:  Diagnosed in Sep 2019 and responded well to Prednisone - Was treated with Prednisone from 10/1/19 to 4/4/20.   Had relapse of SHANTEL in May 2020 and restarted Prednisone on 5/18/20.   Had another relapse in Dec 2020 while Prednisone was being titrated down. Prednisone was increased and Tacrolimus was added on 2/3/21.   Prednisone has been weaned off since June 2021.   Remission was maintained with Tacrolimus but would have increase in proteinuria when Tacrolimus is weaned.   Started Rituximab 1000 mg x 2 doses in April 24, 2023 and May 5, 2023.   Tacrolimus weaned off since October 2023.     Immunosuppression history:  April 24, 2023: Rituximab 1000 mg  May 5, 2023: Rituximab 1000 mg  November 10, 2023: Rituximab 1000 mg  May 15, 2024:  "Rituximab 1000 mg  Nov 15, 2024: Rituximab 1000 mg    PMH/PSH: SHANTEL, HLP.      Previous work up:   September 25, 2019 Renal biopsy:  Minimal change disease  Arteriosclerosis, mild to moderate.     9/23/19: ZAYNAB negative, KL ratio 0.88, UPEP No M spike, SPEP No M spike, Hep B and C NR, C3 127, C4 30, ANCA negative, anti PLA2R Ab negative     9/23/19 Renal US: R 11.8 cm, L 11.4 cm, no hydronephrosis.     ALLERGIES: No Known Allergies    REVIEW OF SYSTEMS:  Review of Systems   Constitutional:  Negative for chills and fever.   Respiratory:  Negative for cough and shortness of breath.    Cardiovascular:  Negative for chest pain and leg swelling.   Gastrointestinal:  Negative for diarrhea, nausea and vomiting.   Genitourinary:  Negative for difficulty urinating and hematuria.   Neurological:  Negative for dizziness and light-headedness.     OBJECTIVE:  /80 (BP Location: Left arm, Patient Position: Sitting, Cuff Size: Standard)   Pulse 91   Ht 5' 4\" (1.626 m)   Wt 57.6 kg (127 lb)   LMP 03/05/2025 (Approximate)   SpO2 99%   BMI 21.80 kg/m²   Current Weight: Weight - Scale: 57.6 kg (127 lb) Body mass index is 21.8 kg/m².  Physical Exam  Constitutional:       General: She is not in acute distress.     Appearance: Normal appearance. She is normal weight. She is not toxic-appearing.   HENT:      Head: Normocephalic and atraumatic.   Eyes:      Conjunctiva/sclera: Conjunctivae normal.   Cardiovascular:      Rate and Rhythm: Normal rate and regular rhythm.      Heart sounds: Normal heart sounds.   Pulmonary:      Effort: Pulmonary effort is normal.      Breath sounds: Normal breath sounds.   Abdominal:      General: Bowel sounds are normal.      Palpations: Abdomen is soft.   Musculoskeletal:      Right lower leg: No edema.      Left lower leg: No edema.   Skin:     General: Skin is warm and dry.   Neurological:      Mental Status: She is alert. Mental status is at baseline.   Psychiatric:         Behavior: Behavior " normal.         Judgment: Judgment normal.       Medications:  Current Outpatient Medications:     Ascorbic Acid (vitamin C) 1000 MG tablet, Take 1,000 mg by mouth daily, Disp: , Rfl:     Calcium Carb-Cholecalciferol (Calcium+D3) 600-20 MG-MCG TABS, Take 1 tablet by mouth in the morning, Disp: , Rfl:     Multiple Vitamin (MULTIVITAMIN ADULT PO), Take 1 tablet by mouth in the morning, Disp: , Rfl:     Nerve Stimulator (TENS Therapy Pain Relief) EDGAR, Use 1 application 2 (two) times a day as needed (For left lumbar radiculopathy), Disp: 1 each, Rfl: 0  No current facility-administered medications for this visit.    Facility-Administered Medications Ordered in Other Visits:     lidocaine (PF) (XYLOCAINE-MPF) 1 % injection 5 mL, 5 mL, Infiltration, Once, Venessa Garcia MD    Laboratory Results:  Results for orders placed or performed in visit on 04/01/25   Magnesium   Result Value Ref Range    Magnesium, Serum 2.1 1.5 - 2.5 mg/dL   Phosphorus   Result Value Ref Range    Phosphorus, Serum 3.6 2.5 - 4.5 mg/dL   Comprehensive metabolic panel   Result Value Ref Range    Glucose, Random 82 65 - 99 mg/dL    BUN 11 7 - 25 mg/dL    Creatinine 0.62 0.50 - 0.99 mg/dL    eGFR 110 > OR = 60 mL/min/1.73m2    SL AMB BUN/CREATININE RATIO SEE NOTE: 6 - 22 (calc)    Sodium 138 135 - 146 mmol/L    Potassium 4.0 3.5 - 5.3 mmol/L    Chloride 102 98 - 110 mmol/L    CO2 29 20 - 32 mmol/L    Calcium 9.5 8.6 - 10.2 mg/dL    Protein, Total 6.9 6.1 - 8.1 g/dL    Albumin 4.4 3.6 - 5.1 g/dL    Globulin 2.5 1.9 - 3.7 g/dL (calc)    Albumin/Globulin Ratio 1.8 1.0 - 2.5 (calc)    TOTAL BILIRUBIN 1.4 (H) 0.2 - 1.2 mg/dL    Alkaline Phosphatase 37 31 - 125 U/L    AST 18 10 - 35 U/L    ALT 14 6 - 29 U/L   Lymphocyte Subset Panel 2   Result Value Ref Range    % CD 3 Pos. Lymph. 89 (H) 57 - 85 %    CD3 Abs 1,463 840 - 3,060 cells/uL    CD4 % Fischer T Cell 72 (H) 30 - 61 %    CD4 ABS 1,223 490 - 1,740 cells/uL    CD8 % Suppressor T Cell 17 12 -  42 %    CD8 T Cell Abs 290 180 - 1,170 cells/uL    CD4/CD8 Ratio 4.21 0.86 - 5.00    CD19 (B Cells) 0 (L) 6 - 29 %    CD19 Absolute <20 (L) 110 - 660 cells/uL    Lymphocytes (Absolute) 1,643 850 - 3,900 cells/uL    COMMENT     Urinalysis with microscopic   Result Value Ref Range    Color UA YELLOW YELLOW    Urine Appearance CLEAR CLEAR    Specific Gravity 1.010 1.001 - 1.035    Ph 7.0 5.0 - 8.0    Glucose, Urine NEGATIVE NEGATIVE    Bilirubin, Urine NEGATIVE NEGATIVE    Ketone, Urine NEGATIVE NEGATIVE    Blood, Urine NEGATIVE NEGATIVE    Protein, Urine NEGATIVE NEGATIVE    Nitrites Urine NEGATIVE NEGATIVE    Leukocyte Esterase NEGATIVE NEGATIVE    SL AMB WBC, URINE NONE SEEN < OR = 5 /HPF    RBC, Urine NONE SEEN < OR = 2 /HPF    Squamous Epithelial Cells NONE SEEN < OR = 5 /HPF    Bacteria, UA NONE SEEN NONE SEEN /HPF    Hyaline Casts NONE SEEN NONE SEEN /LPF    Comment(s)     CBC and differential   Result Value Ref Range    White Blood Cell Count 6.6 3.8 - 10.8 Thousand/uL    Red Blood Cell Count 4.56 3.80 - 5.10 Million/uL    Hemoglobin 13.8 11.7 - 15.5 g/dL    HCT 41.4 35.0 - 45.0 %    MCV 90.8 80.0 - 100.0 fL    MCH 30.3 27.0 - 33.0 pg    MCHC 33.3 32.0 - 36.0 g/dL    RDW 11.3 11.0 - 15.0 %    Platelet Count 369 140 - 400 Thousand/uL    SL AMB MPV 9.3 7.5 - 12.5 fL    Neutrophils (Absolute) 4,066 1,500 - 7,800 cells/uL    Lymphocytes (Absolute) 1,696 850 - 3,900 cells/uL    Monocytes (Absolute) 574 200 - 950 cells/uL    Eosinophils (Absolute) 172 15 - 500 cells/uL    Basophils ABS 92 0 - 200 cells/uL    Neutrophils 61.6 %    Lymphocytes 25.7 %    Monocytes 8.7 %    Eosinophils 2.6 %    Basophils PCT 1.4 %   Protein, Total w/Creat, Random Urine   Result Value Ref Range    Creatinine, Urine 40 20 - 275 mg/dL    Protein/Creat Ratio NOTE 24 - 184 mg/g creat    Protein/Creat Ratio NOTE 0.024 - 0.184 mg/mg creat    Total Protein, Urine <4 (L) 5 - 24 mg/dL

## 2025-04-15 NOTE — ASSESSMENT & PLAN NOTE
Currently in remission.   Last dose of Rituximab was November 2024.   She is on Rituximab 1000 mg IV every 6 months.   CD19 is <20 - Will change Rituximab to 1000 mg IV every 8 months.   She is due in May 2025 but will move this to July 2025.   Check CD19 in July 2025.

## 2025-04-16 ENCOUNTER — DOCUMENTATION (OUTPATIENT)
Dept: NEPHROLOGY | Facility: CLINIC | Age: 50
End: 2025-04-16

## 2025-04-16 RX ORDER — ACETAMINOPHEN 325 MG/1
650 TABLET ORAL ONCE
OUTPATIENT
Start: 2025-07-08

## 2025-04-16 RX ORDER — DIPHENHYDRAMINE HCL 25 MG
25 TABLET ORAL ONCE
OUTPATIENT
Start: 2025-07-08

## 2025-04-16 RX ORDER — SODIUM CHLORIDE 9 MG/ML
20 INJECTION, SOLUTION INTRAVENOUS ONCE
OUTPATIENT
Start: 2025-07-08

## 2025-04-16 NOTE — PROGRESS NOTES
Next dose of Rituximab scheduled for 7/8/25 at 7:30 [facility].  Pt aware.  (Will now be every 8 months per Dr. Mckeon)

## 2025-04-17 ENCOUNTER — TELEPHONE (OUTPATIENT)
Dept: MAMMOGRAPHY | Facility: CLINIC | Age: 50
End: 2025-04-17

## 2025-04-17 PROCEDURE — 88305 TISSUE EXAM BY PATHOLOGIST: CPT | Performed by: PATHOLOGY

## 2025-04-18 ENCOUNTER — TELEPHONE (OUTPATIENT)
Dept: MAMMOGRAPHY | Facility: CLINIC | Age: 50
End: 2025-04-18

## 2025-06-12 NOTE — PROGRESS NOTES
Patient to [facility]: Offers no complaints at present time: Lab work ( 10/10/24 ) reviewed: Within parameters to treat: Denies any recent infection / illness: Right FA PIV initiated without incident  
Tolerated infusion without incident: No adverse reactions noted: Verified follow up appt with patient ( 05/15/24 ): AVS offered and declined  
Home

## 2025-06-25 ENCOUNTER — RESULTS FOLLOW-UP (OUTPATIENT)
Dept: OTHER | Facility: HOSPITAL | Age: 50
End: 2025-06-25

## 2025-06-25 LAB
ALBUMIN SERPL-MCNC: 4.5 G/DL (ref 3.6–5.1)
BUN SERPL-MCNC: 12 MG/DL (ref 7–25)
BUN/CREAT SERPL: NORMAL (CALC) (ref 6–22)
CALCIUM SERPL-MCNC: 9.2 MG/DL (ref 8.6–10.2)
CD19 CELLS # BLD: 25 CELLS/UL (ref 110–660)
CD19 CELLS NFR BLD: 2 % (ref 6–29)
CD3 CELLS # BLD: 1050 CELLS/UL (ref 840–3060)
CD3 CELLS NFR BLD: 77 % (ref 57–85)
CD3+CD4+ CELLS # BLD: 866 CELLS/UL (ref 490–1740)
CD3+CD4+ CELLS NFR BLD: 61 % (ref 30–61)
CD3+CD4+ CELLS/CD3+CD8+ CLL BLD: 3.95 % (ref 0.86–5)
CD3+CD8+ CELLS # BLD: 220 CELLS/UL (ref 180–1170)
CD3+CD8+ CELLS NFR BLD: 16 % (ref 12–42)
CHLORIDE SERPL-SCNC: 101 MMOL/L (ref 98–110)
CO2 SERPL-SCNC: 28 MMOL/L (ref 20–32)
CREAT SERPL-MCNC: 0.65 MG/DL (ref 0.5–0.99)
CREAT UR-MCNC: 97 MG/DL (ref 20–275)
ERYTHROCYTE [DISTWIDTH] IN BLOOD BY AUTOMATED COUNT: 11.7 % (ref 11–15)
GFR/BSA.PRED SERPLBLD CYS-BASED-ARV: 109 ML/MIN/1.73M2
GLUCOSE SERPL-MCNC: 83 MG/DL (ref 65–99)
HCT VFR BLD AUTO: 40.2 % (ref 35–45)
HGB BLD-MCNC: 13.3 G/DL (ref 11.7–15.5)
LYMPHOCYTES # BLD AUTO: 1358 CELLS/UL (ref 850–3900)
MCH RBC QN AUTO: 30.2 PG (ref 27–33)
MCHC RBC AUTO-ENTMCNC: 33.1 G/DL (ref 32–36)
MCV RBC AUTO: 91.2 FL (ref 80–100)
PHOSPHATE SERPL-MCNC: 3.3 MG/DL (ref 2.5–4.5)
PLATELET # BLD AUTO: 384 THOUSAND/UL (ref 140–400)
PMV BLD REES-ECKER: 9.3 FL (ref 7.5–12.5)
POTASSIUM SERPL-SCNC: 4.3 MMOL/L (ref 3.5–5.3)
PROT UR-MCNC: 7 MG/DL (ref 5–24)
PROT/CREAT UR: 0.07 MG/MG CREAT (ref 0.02–0.18)
PROT/CREAT UR: 72 MG/G CREAT (ref 24–184)
RBC # BLD AUTO: 4.41 MILLION/UL (ref 3.8–5.1)
SODIUM SERPL-SCNC: 136 MMOL/L (ref 135–146)
WBC # BLD AUTO: 15.9 THOUSAND/UL (ref 3.8–10.8)

## 2025-06-25 NOTE — TELEPHONE ENCOUNTER
Spoke to patient about recent labs are stable.          ----- Message from Joselyn Singh PA-C sent at 6/25/2025  1:00 PM EDT -----  Blood work stable.  ----- Message -----  From: [facility] Lab Results In  Sent: 6/24/2025   4:45 PM EDT  To: Ady Mckeon MD

## 2025-07-07 ENCOUNTER — TELEPHONE (OUTPATIENT)
Age: 50
End: 2025-07-07

## 2025-07-07 NOTE — TELEPHONE ENCOUNTER
Alessandra - the clinical  from [facility] called with an approval:      Approval for procedural code:     Approved for: 100 Units (7/8/2025 - 01/08/2026)    Approval Code:  [id number]    Asked us to notify patient a bout approval.

## 2025-07-08 ENCOUNTER — HOSPITAL ENCOUNTER (OUTPATIENT)
Dept: INFUSION CENTER | Facility: CLINIC | Age: 50
Discharge: HOME | End: 2025-07-08
Attending: INTERNAL MEDICINE
Payer: COMMERCIAL

## 2025-07-08 VITALS
DIASTOLIC BLOOD PRESSURE: 72 MMHG | WEIGHT: 126 LBS | TEMPERATURE: 98.3 F | BODY MASS INDEX: 21.63 KG/M2 | SYSTOLIC BLOOD PRESSURE: 113 MMHG | RESPIRATION RATE: 18 BRPM | OXYGEN SATURATION: 99 % | HEART RATE: 101 BPM

## 2025-07-08 DIAGNOSIS — N05.0 MINIMAL CHANGE DISEASE: Primary | ICD-10-CM

## 2025-07-08 PROCEDURE — 96415 CHEMO IV INFUSION ADDL HR: CPT

## 2025-07-08 PROCEDURE — 96413 CHEMO IV INFUSION 1 HR: CPT

## 2025-07-08 RX ORDER — SODIUM CHLORIDE 9 MG/ML
20 INJECTION, SOLUTION INTRAVENOUS ONCE
Status: COMPLETED | OUTPATIENT
Start: 2025-07-08 | End: 2025-07-08

## 2025-07-08 RX ORDER — ACETAMINOPHEN 325 MG/1
650 TABLET ORAL ONCE
Status: COMPLETED | OUTPATIENT
Start: 2025-07-08 | End: 2025-07-08

## 2025-07-08 RX ORDER — ACETAMINOPHEN 325 MG/1
650 TABLET ORAL ONCE
Status: CANCELLED | OUTPATIENT
Start: 2026-02-15

## 2025-07-08 RX ORDER — DIPHENHYDRAMINE HCL 25 MG
25 TABLET ORAL ONCE
Status: COMPLETED | OUTPATIENT
Start: 2025-07-08 | End: 2025-07-08

## 2025-07-08 RX ORDER — SODIUM CHLORIDE 9 MG/ML
20 INJECTION, SOLUTION INTRAVENOUS ONCE
Status: CANCELLED | OUTPATIENT
Start: 2026-02-15

## 2025-07-08 RX ORDER — DIPHENHYDRAMINE HCL 25 MG
25 TABLET ORAL ONCE
Status: CANCELLED | OUTPATIENT
Start: 2026-02-15

## 2025-07-08 RX ADMIN — DIPHENHYDRAMINE HYDROCHLORIDE 25 MG: 25 TABLET ORAL at 07:43

## 2025-07-08 RX ADMIN — SODIUM CHLORIDE 1000 MG: 0.9 INJECTION, SOLUTION INTRAVENOUS at 08:47

## 2025-07-08 RX ADMIN — SODIUM CHLORIDE 20 ML/HR: 0.9 INJECTION, SOLUTION INTRAVENOUS at 07:43

## 2025-07-08 RX ADMIN — ACETAMINOPHEN 650 MG: 325 TABLET ORAL at 07:43

## 2025-07-08 NOTE — PROGRESS NOTES
Patient arrives for Rituximab, offers no complaints. Denies any recent illness or antibiotic use.

## 2025-07-09 ENCOUNTER — TELEPHONE (OUTPATIENT)
Dept: NEPHROLOGY | Facility: CLINIC | Age: 50
End: 2025-07-09